# Patient Record
Sex: FEMALE | Race: WHITE | NOT HISPANIC OR LATINO | Employment: OTHER | URBAN - METROPOLITAN AREA
[De-identification: names, ages, dates, MRNs, and addresses within clinical notes are randomized per-mention and may not be internally consistent; named-entity substitution may affect disease eponyms.]

---

## 2017-09-20 ENCOUNTER — HOSPITAL ENCOUNTER (EMERGENCY)
Facility: HOSPITAL | Age: 62
Discharge: HOME | End: 2017-09-20
Attending: EMERGENCY MEDICINE
Payer: MEDICARE

## 2017-09-20 VITALS
RESPIRATION RATE: 17 BRPM | OXYGEN SATURATION: 97 % | WEIGHT: 176 LBS | BODY MASS INDEX: 32.39 KG/M2 | HEIGHT: 62 IN | HEART RATE: 51 BPM | DIASTOLIC BLOOD PRESSURE: 55 MMHG | TEMPERATURE: 98 F | SYSTOLIC BLOOD PRESSURE: 118 MMHG

## 2017-09-20 DIAGNOSIS — R07.9 CHEST PAIN, UNSPECIFIED TYPE: ICD-10-CM

## 2017-09-20 DIAGNOSIS — R53.1 WEAKNESS: Primary | ICD-10-CM

## 2017-09-20 LAB
ALBUMIN SERPL BCP-MCNC: 3.6 G/DL
ALP SERPL-CCNC: 62 U/L
ALT SERPL W/O P-5'-P-CCNC: 9 U/L
ANION GAP SERPL CALC-SCNC: 10 MMOL/L
AST SERPL-CCNC: 16 U/L
BASOPHILS # BLD AUTO: 0.04 K/UL
BASOPHILS NFR BLD: 0.4 %
BILIRUB SERPL-MCNC: 0.6 MG/DL
BNP SERPL-MCNC: 13 PG/ML
BUN SERPL-MCNC: 18 MG/DL
CALCIUM SERPL-MCNC: 9.8 MG/DL
CHLORIDE SERPL-SCNC: 103 MMOL/L
CHOLEST SERPL-MCNC: 239 MG/DL
CHOLEST/HDLC SERPL: 4.3 {RATIO}
CK SERPL-CCNC: 75 U/L
CO2 SERPL-SCNC: 26 MMOL/L
CREAT SERPL-MCNC: 1 MG/DL
DIFFERENTIAL METHOD: ABNORMAL
EOSINOPHIL # BLD AUTO: 0.6 K/UL
EOSINOPHIL NFR BLD: 5.6 %
ERYTHROCYTE [DISTWIDTH] IN BLOOD BY AUTOMATED COUNT: 13 %
EST. GFR  (AFRICAN AMERICAN): >60 ML/MIN/1.73 M^2
EST. GFR  (NON AFRICAN AMERICAN): >60 ML/MIN/1.73 M^2
GLUCOSE SERPL-MCNC: 88 MG/DL
HCT VFR BLD AUTO: 45.2 %
HDLC SERPL-MCNC: 56 MG/DL
HDLC SERPL: 23.4 %
HGB BLD-MCNC: 15.3 G/DL
INR PPP: 1
LDLC SERPL CALC-MCNC: 161.6 MG/DL
LYMPHOCYTES # BLD AUTO: 3.3 K/UL
LYMPHOCYTES NFR BLD: 30.3 %
MCH RBC QN AUTO: 31.1 PG
MCHC RBC AUTO-ENTMCNC: 33.8 G/DL
MCV RBC AUTO: 92 FL
MONOCYTES # BLD AUTO: 0.9 K/UL
MONOCYTES NFR BLD: 7.9 %
NEUTROPHILS # BLD AUTO: 6 K/UL
NEUTROPHILS NFR BLD: 55.6 %
NONHDLC SERPL-MCNC: 183 MG/DL
PLATELET # BLD AUTO: 177 K/UL
PMV BLD AUTO: 12.5 FL
POCT GLUCOSE: 77 MG/DL (ref 70–110)
POTASSIUM SERPL-SCNC: 3.6 MMOL/L
PROT SERPL-MCNC: 7.3 G/DL
PROTHROMBIN TIME: 10.6 SEC
RBC # BLD AUTO: 4.92 M/UL
SODIUM SERPL-SCNC: 139 MMOL/L
TRIGL SERPL-MCNC: 107 MG/DL
TROPONIN I SERPL DL<=0.01 NG/ML-MCNC: 0.01 NG/ML
TROPONIN I SERPL DL<=0.01 NG/ML-MCNC: <0.006 NG/ML
TSH SERPL DL<=0.005 MIU/L-ACNC: 1.39 UIU/ML
WBC # BLD AUTO: 10.79 K/UL

## 2017-09-20 PROCEDURE — 25500020 PHARM REV CODE 255

## 2017-09-20 PROCEDURE — 25000003 PHARM REV CODE 250: Performed by: EMERGENCY MEDICINE

## 2017-09-20 PROCEDURE — 85610 PROTHROMBIN TIME: CPT

## 2017-09-20 PROCEDURE — 80053 COMPREHEN METABOLIC PANEL: CPT

## 2017-09-20 PROCEDURE — 84443 ASSAY THYROID STIM HORMONE: CPT

## 2017-09-20 PROCEDURE — 93010 ELECTROCARDIOGRAM REPORT: CPT | Mod: ,,, | Performed by: INTERNAL MEDICINE

## 2017-09-20 PROCEDURE — 82962 GLUCOSE BLOOD TEST: CPT

## 2017-09-20 PROCEDURE — 93005 ELECTROCARDIOGRAM TRACING: CPT

## 2017-09-20 PROCEDURE — A9585 GADOBUTROL INJECTION: HCPCS

## 2017-09-20 PROCEDURE — G0425 INPT/ED TELECONSULT30: HCPCS | Mod: GT,,, | Performed by: PSYCHIATRY & NEUROLOGY

## 2017-09-20 PROCEDURE — 82550 ASSAY OF CK (CPK): CPT

## 2017-09-20 PROCEDURE — 85025 COMPLETE CBC W/AUTO DIFF WBC: CPT

## 2017-09-20 PROCEDURE — 99285 EMERGENCY DEPT VISIT HI MDM: CPT | Mod: 25

## 2017-09-20 PROCEDURE — 83880 ASSAY OF NATRIURETIC PEPTIDE: CPT

## 2017-09-20 PROCEDURE — 80061 LIPID PANEL: CPT

## 2017-09-20 PROCEDURE — 84484 ASSAY OF TROPONIN QUANT: CPT

## 2017-09-20 RX ORDER — GADOBUTROL 604.72 MG/ML
INJECTION INTRAVENOUS
Status: COMPLETED | OUTPATIENT
Start: 2017-09-20 | End: 2017-09-20

## 2017-09-20 RX ORDER — ASPIRIN 325 MG
325 TABLET ORAL
Status: COMPLETED | OUTPATIENT
Start: 2017-09-20 | End: 2017-09-20

## 2017-09-20 RX ADMIN — GADOBUTROL: 604.72 INJECTION INTRAVENOUS at 12:09

## 2017-09-20 RX ADMIN — ASPIRIN 325 MG ORAL TABLET 325 MG: 325 PILL ORAL at 02:09

## 2017-09-20 NOTE — ED PROVIDER NOTES
"Encounter Date: 9/20/2017       History     Chief Complaint   Patient presents with    Aphasia     aphasia and arm weakness. LKW unknown, pt does not remember     Patient is a 62-year-old female who says she awoke at 7 AM this morning with a tingling sensation described as "pins and needles" to her arms and to her face.  She also felt weak.  No syncope.  She denies headache or visual changes.  She also experienced chest discomfort with mild shortness of breath.  No nausea or vomiting.  Patient was able to drive herself to the hospital.      The history is provided by the patient.     Review of patient's allergies indicates:   Allergen Reactions    Pcn [penicillins] Other (See Comments)     Unable to obtain     No past medical history on file.  No past surgical history on file.  No family history on file.  Social History   Substance Use Topics    Smoking status: Former Smoker     Packs/day: 2.00     Years: 46.00    Smokeless tobacco: Not on file    Alcohol use Yes     Review of Systems   Constitutional: Negative for chills and fever.   Eyes: Negative for visual disturbance.   Respiratory: Positive for shortness of breath.    Cardiovascular: Positive for chest pain.   Gastrointestinal: Negative for nausea and vomiting.   Neurological: Positive for weakness and numbness. Negative for dizziness, syncope and headaches.   All other systems reviewed and are negative.      Physical Exam     Initial Vitals   BP Pulse Resp Temp SpO2   -- -- -- -- --      MAP       --         Physical Exam    Nursing note and vitals reviewed.  Constitutional: No distress.   HENT:   Head: Normocephalic and atraumatic.   Mouth/Throat: Oropharynx is clear and moist.   Eyes: EOM are normal. Pupils are equal, round, and reactive to light.   Neck: Normal range of motion. Neck supple.   Cardiovascular: Normal rate, regular rhythm and normal heart sounds.   Pulmonary/Chest: Breath sounds normal.   Abdominal: Soft. There is no tenderness. "   Musculoskeletal: Normal range of motion. She exhibits no edema.   Neurological: She is alert and oriented to person, place, and time.   Tremors noted to both upper extremities.   Skin: Skin is warm and dry.   Psychiatric: Her behavior is normal. Thought content normal.         ED Course   Procedures  Labs Reviewed   CBC W/ AUTO DIFFERENTIAL - Abnormal; Notable for the following:        Result Value    MCH 31.1 (*)     Eos # 0.6 (*)     All other components within normal limits   COMPREHENSIVE METABOLIC PANEL - Abnormal; Notable for the following:     ALT 9 (*)     All other components within normal limits   LIPID PANEL - Abnormal; Notable for the following:     Cholesterol 239 (*)     LDL Cholesterol 161.6 (*)     All other components within normal limits   PROTIME-INR   TSH   CK   TROPONIN I   CK   TROPONIN I   POCT GLUCOSE   POCT GLUCOSE     EKG Readings: (Independently Interpreted)   Rhythm: Normal Sinus Rhythm. Heart Rate: 64. ST Segments: Normal ST Segments. T Waves Flipped: II, AVF and V6.     Imaging Results          MRA Neck with contrast (Final result)  Result time 09/20/17 13:49:45    Final result by Kenneth Landin MD (09/20/17 13:49:45)                 Impression:         1.Periventricular white matter disease affecting more so the left cerebral hemisphere, appropriate for this patient's age.  2.  No evidence of acute infarction, intracranial mass or hemorrhage seen.  Mild inflammatory changes of the left side of the sphenoid sinus, a few ethmoid air cells and small mucus retention cyst in the right maxillary sinus.  3. Normal MRA of the intracranial circulation.  4.  Focal area of narrowing of the left carotid bulb, this appears to be 50%, correlation with evaluation with carotid Doppler recommended        Electronically signed by: KENNETH LANDIN MD  Date:     09/20/17  Time:    13:49              Narrative:    Brain MRI without and with contrast.    Comparison: None    Technique: Sagittal  and axial T1, axial T2, FLAIR, SWAN/FILT PHA, diffusion/ ADC,   images were obtained.  The patient received 10 cc of Gadavist IV contrast, post contrast images obtained.    Results: The brain is normally formed.  The ventricular system is normal in size.  There are few small areas of abnormal T2 and flair signal seen within the periventricular white matter of the bilateral cerebral hemispheres greater on the left consistent with small vessel chronic ischemic changes, normal for this patient's age.  There is no diffusion signal abnormality to suggest an acute infarction.  There is no evidence of remote sizable infarction.  The SWAN/FILT PHA images demonstrate no areas of abnormal signal to indicate remote hemorrhage.  There is no intra-axial mass or extra axial mass or fluid collection.  The sella, parasellar region, orbits,  and temporal bones demonstrate no significant abnormalities.  There is inflammatory changes of the left side of the sphenoid sinus, few right-sided ethmoid air cells and a small mucus retention cyst in the right maxillary sinus. The skull base flow voids are accounted for.   Following the administration of IV gadolinium contrast, no intra-axial or extra-axial areas of abnormal enhancement seen to suggest an inflammatory, infectious or tumor process.    MRA head.    3-D time-of-flight technique was utilized to obtain the MRA of the intracranial circulation.  MIP images were generated.    Results: The MRA flow map of the [place] vessels shows no significant abnormality on the MIP and source images.  There is no evidence of aneurysm, arteriovenous malformation, segmental flow decrease or major vascular occlusion.  The vertebro-basilar system appears normal.    Contrast of MRA neck    Results: MR contrast angiogram of the neck.  The patient received 10 cc of Gadavist contrast.    Normal appearance of the aortic arch. The bilateral common carotid arteries, and right internal carotid artery,  bilateral external carotid arteries appear normal without stenosis or irregularity.  Apparent focal stenosis greater than 50% of the left carotid bulb should be better evaluated with carotid Doppler.  The  bilateral vertebral arteries are patent.                             MRA Brain without contrast (Final result)  Result time 09/20/17 13:49:45    Final result by Kenneth Landin MD (09/20/17 13:49:45)                 Impression:         1.Periventricular white matter disease affecting more so the left cerebral hemisphere, appropriate for this patient's age.  2.  No evidence of acute infarction, intracranial mass or hemorrhage seen.  Mild inflammatory changes of the left side of the sphenoid sinus, a few ethmoid air cells and small mucus retention cyst in the right maxillary sinus.  3. Normal MRA of the intracranial circulation.  4.  Focal area of narrowing of the left carotid bulb, this appears to be 50%, correlation with evaluation with carotid Doppler recommended        Electronically signed by: KENNETH LANDIN MD  Date:     09/20/17  Time:    13:49              Narrative:    Brain MRI without and with contrast.    Comparison: None    Technique: Sagittal and axial T1, axial T2, FLAIR, SWAN/FILT PHA, diffusion/ ADC,   images were obtained.  The patient received 10 cc of Gadavist IV contrast, post contrast images obtained.    Results: The brain is normally formed.  The ventricular system is normal in size.  There are few small areas of abnormal T2 and flair signal seen within the periventricular white matter of the bilateral cerebral hemispheres greater on the left consistent with small vessel chronic ischemic changes, normal for this patient's age.  There is no diffusion signal abnormality to suggest an acute infarction.  There is no evidence of remote sizable infarction.  The SWAN/FILT PHA images demonstrate no areas of abnormal signal to indicate remote hemorrhage.  There is no intra-axial mass or extra  axial mass or fluid collection.  The sella, parasellar region, orbits,  and temporal bones demonstrate no significant abnormalities.  There is inflammatory changes of the left side of the sphenoid sinus, few right-sided ethmoid air cells and a small mucus retention cyst in the right maxillary sinus. The skull base flow voids are accounted for.   Following the administration of IV gadolinium contrast, no intra-axial or extra-axial areas of abnormal enhancement seen to suggest an inflammatory, infectious or tumor process.    MRA head.    3-D time-of-flight technique was utilized to obtain the MRA of the intracranial circulation.  MIP images were generated.    Results: The MRA flow map of the [place] of [place] vessels shows no significant abnormality on the MIP and source images.  There is no evidence of aneurysm, arteriovenous malformation, segmental flow decrease or major vascular occlusion.  The vertebro-basilar system appears normal.    Contrast of MRA neck    Results: MR contrast angiogram of the neck.  The patient received 10 cc of Gadavist contrast.    Normal appearance of the aortic arch. The bilateral common carotid arteries, and right internal carotid artery, bilateral external carotid arteries appear normal without stenosis or irregularity.  Apparent focal stenosis greater than 50% of the left carotid bulb should be better evaluated with carotid Doppler.  The  bilateral vertebral arteries are patent.                             MRI Brain W WO Contrast (Final result)  Result time 09/20/17 13:49:45    Final result by Hina Espitia MD (09/20/17 13:49:45)                 Impression:         1.Periventricular white matter disease affecting more so the left cerebral hemisphere, appropriate for this patient's age.  2.  No evidence of acute infarction, intracranial mass or hemorrhage seen.  Mild inflammatory changes of the left side of the sphenoid sinus, a few ethmoid air cells and small mucus retention cyst  in the right maxillary sinus.  3. Normal MRA of the intracranial circulation.  4.  Focal area of narrowing of the left carotid bulb, this appears to be 50%, correlation with evaluation with carotid Doppler recommended        Electronically signed by: KENNETH LANDIN MD  Date:     09/20/17  Time:    13:49              Narrative:    Brain MRI without and with contrast.    Comparison: None    Technique: Sagittal and axial T1, axial T2, FLAIR, SWAN/FILT PHA, diffusion/ ADC,   images were obtained.  The patient received 10 cc of Gadavist IV contrast, post contrast images obtained.    Results: The brain is normally formed.  The ventricular system is normal in size.  There are few small areas of abnormal T2 and flair signal seen within the periventricular white matter of the bilateral cerebral hemispheres greater on the left consistent with small vessel chronic ischemic changes, normal for this patient's age.  There is no diffusion signal abnormality to suggest an acute infarction.  There is no evidence of remote sizable infarction.  The SWAN/FILT PHA images demonstrate no areas of abnormal signal to indicate remote hemorrhage.  There is no intra-axial mass or extra axial mass or fluid collection.  The sella, parasellar region, orbits,  and temporal bones demonstrate no significant abnormalities.  There is inflammatory changes of the left side of the sphenoid sinus, few right-sided ethmoid air cells and a small mucus retention cyst in the right maxillary sinus. The skull base flow voids are accounted for.   Following the administration of IV gadolinium contrast, no intra-axial or extra-axial areas of abnormal enhancement seen to suggest an inflammatory, infectious or tumor process.    MRA head.    3-D time-of-flight technique was utilized to obtain the MRA of the intracranial circulation.  MIP images were generated.    Results: The MRA flow map of the [place] of [place] vessels shows no significant abnormality on the MIP  and source images.  There is no evidence of aneurysm, arteriovenous malformation, segmental flow decrease or major vascular occlusion.  The vertebro-basilar system appears normal.    Contrast of MRA neck    Results: MR contrast angiogram of the neck.  The patient received 10 cc of Gadavist contrast.    Normal appearance of the aortic arch. The bilateral common carotid arteries, and right internal carotid artery, bilateral external carotid arteries appear normal without stenosis or irregularity.  Apparent focal stenosis greater than 50% of the left carotid bulb should be better evaluated with carotid Doppler.  The  bilateral vertebral arteries are patent.                             X-Ray Chest AP Portable (Final result)  Result time 09/20/17 09:57:37    Final result by Beth Messina MD (09/20/17 09:57:37)                 Impression:     No evidence acute cardiac or pulmonary disease.      Electronically signed by: BETH MESSINA MD  Date:     09/20/17  Time:    09:57              Narrative:    Views: Portable    There is no pneumothorax, pleural effusion or focal consolidation.  The cardiac silhouette is within normal limits. The trachea is midline.  The osseous structures are unremarkable. There is atherosclerotic disease of aorta.                             CT Head Without Contrast (Final result)  Result time 09/20/17 09:32:03    Final result by Kenneth Landin MD (09/20/17 09:32:03)                 Impression:        No acute intracranial process seen.  If patient has symptoms of aphasia, though that MRI has greater sensitivity to detect acute infarction and could be done if this is clinically warranted.      Electronically signed by: KENNETH LANDIN MD  Date:     09/20/17  Time:    09:32              Narrative:    Head CT without contrast.    Comparison: none.    Technique: 5 mm axial images of the head were obtained.  No IV contrast was utilized.    Results: The brain is normally formed and  exhibits normal density throughout.  The ventricular system is within normal limits of size for age and shows no distortion by mass effect.  The brain parenchyma shows no evidence of mass, hemorrhage, or abnormal calcifications. No evidence of acute or remote infarction. No extra-axial masses, hemorrhage or abnormal fluid collections are identified.  The skull appears normal.  Small mucous retention cyst at the floor of the right maxillary sinus. The orbits demonstrate no abnormalities.  The mastoid air cells are well aerated.                                 Medical Decision Making:   ED Management:  62-year-old female who experienced vague symptoms of numbness earlier today and seemed to have difficulty speaking.  Symptoms resolved after arrival to the ED.  Patient underwent an unremarkable head CT.  MRI of the head and neck were also performed showing no signs of a stroke.  She also had chest pain today and underwent 2 serial troponins which are both negative.  Patient was seen by the [facility] family practice resident, who she will follow up with tomorrow.  She will return here for any further concerns developing prior to follow-up.                   ED Course      Clinical Impression:   The primary encounter diagnosis was Expressive aphasia. Diagnoses of Weakness and Chest pain, unspecified type were also pertinent to this visit.                           Jaswant Humphrey MD  09/20/17 [id number]       Jaswant Humphrey MD  09/23/17 [id number]

## 2017-09-20 NOTE — CONSULTS
[facility]  Tele-Consultation Note    Consultation started: 9/20/2017 at 9:45 AM   Consulting Provider: Spoke Physician:: Dr. Jaswant Humphrey  The patient location is [facility]  Spoke hospital nurse at bedside with patient assisting consultant.     Subjective:   Subjective   History of Present Illness:  Elissa Raymond is a 62 y.o. female who presents with acute onset inability to speak, tingling bilateral upper extremities and whole face. EMS reported that she was weak in the right UE but she denies being weak.  By the time I started my consultation patient was remarkably improved.    Wake up Stroke?: no  Last known normal: Last known well (date and time):: unknown  Recent bleeding noted: no  Does the patient take any Blood Thinners? no           H&P was obtained from patient.   Past Medical History: hypertension and hyperlipidemia    Past Surgical History: no surgeries within the last 3 months    Family History: hypertension    Social History: no smoking, no drinking, no drugs    Medications: No current facility-administered medications for this encounter.     Current Outpatient Prescriptions:     ciclopirox (PENLAC) 8 % Soln, Apply topically once daily. Apply topically to affected nails once daily.  Remove once weekly.  Repeat.  Follow package insert., Disp: 6.6 mL, Rfl: 12    urea (CARMOL) 40 % Crea, Apply topically 2 (two) times daily., Disp: 199 each, Rfl: 10    Allergies: No known drug allergies    Review Of Systems:     Review of Systems   Constitutional: Negative for chills and fever.   HENT: Negative for facial swelling and trouble swallowing.    Eyes: Negative for photophobia, discharge and visual disturbance.   Respiratory: Positive for chest tightness. Negative for wheezing and stridor.    Cardiovascular: Negative for chest pain, palpitations and leg swelling.   Gastrointestinal: Negative for abdominal pain, diarrhea and  vomiting.   Endocrine: Negative for cold intolerance and polyphagia.   Genitourinary: Negative for hematuria.   Musculoskeletal: Negative for gait problem, neck pain and neck stiffness.   Allergic/Immunologic: Negative for immunocompromised state.   Neurological: Positive for tremors and numbness. Negative for dizziness, seizures, facial asymmetry, weakness and headaches.   Hematological: Does not bruise/bleed easily.   Psychiatric/Behavioral: Negative for agitation, behavioral problems and confusion.       Objective:     Vitals: There were no vitals filed for this visit. BP: 140/70, Respiratory Rate: 17 and Heart Rate: 66    Objective   Physical Exam:  Physical Exam   Constitutional: She is oriented to person, place, and time. She appears well-developed and well-nourished.   HENT:   Head: Normocephalic and atraumatic.   Eyes: EOM are normal. Pupils are equal, round, and reactive to light.   Neck: Normal range of motion. Neck supple.   Cardiovascular: Normal rate.    Pulmonary/Chest: Effort normal and breath sounds normal.   Abdominal: Soft. She exhibits no mass.   Genitourinary:   Genitourinary Comments: No performed   Neurological: She is alert and oriented to person, place, and time. She displays normal reflexes. No cranial nerve deficit. She exhibits normal muscle tone. Coordination normal.   Skin: Skin is warm. No rash noted.   Psychiatric: She has a normal mood and affect. Her behavior is normal.          Imaging Notes: No hemmorhage. No mass effect. No early infarct signs. Impression performed at: 9:28 am    NIH Stroke Scale:  Interval: baseline (upon arrival/admit)  Level of Consciousness: 0 - alert  LOC Questions: 0 - answers both correctly  LOC Commands: 0 - performs both correctly  Best Gaze: 0 - normal  Visual: 0 - no visual loss  Facial Palsy: 0 - normal  Motor Left Arm: 0 - no drift  Motor Right Arm: 0 - no drift  Motor Left Le - no drift  Motor Right Le - no drift  Limb Ataxia: 0 -  absent  Sensory: 0 - normal  Best Language: 0 - no aphasia  Dysarthria: 0 - normal articulation  Extinction and Inattention: 0 - no neglect  NIH Stroke Scale Total: 0  ABCD2 Scale for TIA:   Age > or = 60: 1 - yes  B/P or = 140/9 at Initial Evaluation: 0 - no  Clinical Features of TIA: 0 - other symptoms  Duration of Symptoms: 1 - 10-59 minutes  Diabetes Mellitus in History: 0 - no  ABCD2 Scale Total: 2        Assessment - Diagnosis - Goals:   61 y/o with acute onset inability to speak, bilateral upper extremities and face tingling. NIHSS 0.  Although can not ruled out a subcortical  Infarct, her syndrome doesn't fit anatomically.    Plan   MRI/MRA brain. Further stroke work up if MRI + for stroke.  Diagnosis/Impression: Transient neurological dysfunction of unclear etiology. Can not ruled out subcortical infarct but her syndrome does not fit anatomically.    Alteplase Recommendation: Altaplase not recommended due to Outside of treament window    Recommendation: NPO until after pass dysphagia screen. Diagnostic studies: MRA head to assess vasculature, MRA neck/arch to assess vasculature, MRI head without contrast to assess brain parenchyma  Consults: Other: neurology    Disposition Recommendation:  do not transfer       Possible Interventional Revascularization Candidate? No; No significant neurological deficit    Recommended the emergency room physician to have a brief discussion with the patient and/or family if available regarding the risks and benefits of treatment, and to briefly document the occurrence of that discussion in his clinical encounter note.     The attending portion of this evaluation, treatment, and documentation was performed per Michael Pickett via audiovisual.      Billing code:  (non-intervention mild to moderate stroke, TIA, some mimics)    · This patient has a critical neurological condition/illness, with some potential for high morbidity and mortality.  · There is a moderate  probability for acute neurological change leading to clinical and possibly life-threatening deterioration requiring highest level of physician preparedness for urgent intervention.  · Care was coordinated with other physicians involved in the patient's care.  · Radiologic studies and laboratory data were reviewed and interpreted, and plan of care was re-assessed based on the results.  · Diagnosis, treatment options and prognosis may have been discussed with the patient and/or family members or caregiver.        Consultation ended: 9/20/2017 at 9:43 am

## 2017-11-02 ENCOUNTER — OFFICE VISIT (OUTPATIENT)
Dept: FAMILY MEDICINE | Facility: CLINIC | Age: 62
End: 2017-11-02
Attending: FAMILY MEDICINE
Payer: MEDICARE

## 2017-11-02 VITALS
HEART RATE: 69 BPM | SYSTOLIC BLOOD PRESSURE: 120 MMHG | WEIGHT: 135.56 LBS | BODY MASS INDEX: 24.95 KG/M2 | DIASTOLIC BLOOD PRESSURE: 80 MMHG | TEMPERATURE: 98 F | OXYGEN SATURATION: 96 % | HEIGHT: 62 IN

## 2017-11-02 DIAGNOSIS — J20.9 ACUTE BRONCHITIS, UNSPECIFIED ORGANISM: Primary | ICD-10-CM

## 2017-11-02 DIAGNOSIS — R05.9 COUGH: ICD-10-CM

## 2017-11-02 DIAGNOSIS — Z72.0 TOBACCO USE: ICD-10-CM

## 2017-11-02 DIAGNOSIS — M25.50 ARTHRALGIA, UNSPECIFIED JOINT: ICD-10-CM

## 2017-11-02 PROCEDURE — 99204 OFFICE O/P NEW MOD 45 MIN: CPT | Mod: S$PBB,,, | Performed by: FAMILY MEDICINE

## 2017-11-02 PROCEDURE — 99213 OFFICE O/P EST LOW 20 MIN: CPT | Mod: PBBFAC,PO | Performed by: FAMILY MEDICINE

## 2017-11-02 PROCEDURE — 99999 PR PBB SHADOW E&M-EST. PATIENT-LVL III: CPT | Mod: PBBFAC,,, | Performed by: FAMILY MEDICINE

## 2017-11-02 RX ORDER — DOXYCYCLINE 100 MG/1
100 CAPSULE ORAL 2 TIMES DAILY
Qty: 20 CAPSULE | Refills: 0 | Status: SHIPPED | OUTPATIENT
Start: 2017-11-02 | End: 2018-05-16

## 2017-11-02 RX ORDER — IBUPROFEN 800 MG/1
800 TABLET ORAL 3 TIMES DAILY PRN
Qty: 30 TABLET | Refills: 0 | Status: SHIPPED | OUTPATIENT
Start: 2017-11-02 | End: 2018-05-16

## 2017-11-02 RX ORDER — TRAZODONE HYDROCHLORIDE 100 MG/1
100 TABLET ORAL NIGHTLY
COMMUNITY
Start: 2017-10-23 | End: 2018-06-06

## 2017-11-02 RX ORDER — PROMETHAZINE HYDROCHLORIDE AND DEXTROMETHORPHAN HYDROBROMIDE 6.25; 15 MG/5ML; MG/5ML
5 SYRUP ORAL 2 TIMES DAILY PRN
Qty: 150 ML | Refills: 0 | Status: SHIPPED | OUTPATIENT
Start: 2017-11-02 | End: 2017-11-12

## 2017-11-02 RX ORDER — LISINOPRIL AND HYDROCHLOROTHIAZIDE 12.5; 2 MG/1; MG/1
1 TABLET ORAL DAILY
COMMUNITY
Start: 2017-09-27 | End: 2018-06-06

## 2017-11-02 NOTE — PROGRESS NOTES
Subjective:       Patient ID: Elissa Raymond is a 62 y.o. female.    Chief Complaint: Sinusitis; Cough; and Headache    62 YR OLD PLEASANT WHITE FEMALE WITH SMOKING USE, POLYARTHRITIS, HTN, DEPRESSION, PRESENTS TODAY AS NEW PATIENT AND EVALUATION OF COUGH/SOB AND ARTHRALGIA. ONSET 2 MONTHS AGO AND GRADUALLY WORSENING. SHE HAD TIA 2 MONTHS AGO IN [place] AND SHE IS NOT NORMAL SINCE THEN. NO FEVER OR CHILLS. SHE HAD NO WORK UP SINCE HOSPITAL DISCHARGE OTHER THAN CARDIOVASCULAR AND STROKE W/U. SHE DENIES ANY SICK CONTACTS/RECENT TRAVEL. DETAILS AS FOLLOWS -      HISTORY AS BELOW - REVIEWED       Cough   This is a chronic problem. The current episode started more than 1 month ago. The problem has been unchanged. The problem occurs constantly. The cough is non-productive. Associated symptoms include myalgias and shortness of breath. Pertinent negatives include no chest pain, ear pain, headaches, rash, rhinorrhea, sore throat, weight loss or wheezing. Nothing aggravates the symptoms. Risk factors for lung disease include smoking/tobacco exposure. She has tried OTC cough suppressant for the symptoms. The treatment provided no relief. Her past medical history is significant for bronchitis, environmental allergies and pneumonia. There is no history of asthma.   Shortness of Breath   This is a recurrent problem. The current episode started 1 to 4 weeks ago. The problem occurs intermittently. The problem has been unchanged. Pertinent negatives include no chest pain, coryza, ear pain, headaches, leg swelling, orthopnea, rash, rhinorrhea, sore throat, swollen glands or wheezing. Nothing aggravates the symptoms. She has tried OTC cough suppressants for the symptoms. The treatment provided no relief. Her past medical history is significant for pneumonia. There is no history of allergies, asthma, CAD, chronic lung disease or DVT.   Arthritis   Presents for initial visit. The disease course has been worsening. She complains of  pain. Affected locations include the right knee, left knee, right shoulder, left shoulder, right wrist, left elbow and left hip. Her pain is at a severity of 5/10. Associated symptoms include fatigue and pain at night. Pertinent negatives include no dysuria, rash, Raynaud's syndrome or weight loss. Her past medical history is significant for osteoarthritis. Past treatments include acetaminophen. The treatment provided no relief. Factors aggravating her arthritis include activity, lifting and exposure to cold air. Compliance with prior treatments has been good.     Review of Systems   Constitutional: Positive for fatigue. Negative for activity change, diaphoresis, unexpected weight change and weight loss.   HENT: Negative.  Negative for congestion, ear discharge, ear pain, hearing loss, rhinorrhea, sore throat and voice change.    Eyes: Negative.  Negative for pain, discharge and visual disturbance.   Respiratory: Positive for cough and shortness of breath. Negative for chest tightness and wheezing.    Cardiovascular: Negative.  Negative for chest pain, orthopnea and leg swelling.   Gastrointestinal: Negative.  Negative for abdominal distention, anal bleeding, constipation and nausea.   Endocrine: Negative.  Negative for cold intolerance, polydipsia and polyuria.   Genitourinary: Negative.  Negative for decreased urine volume, difficulty urinating, dysuria, frequency, menstrual problem and vaginal pain.   Musculoskeletal: Positive for arthralgias, arthritis, back pain and myalgias. Negative for gait problem.   Skin: Negative.  Negative for color change, pallor, rash and wound.   Allergic/Immunologic: Positive for environmental allergies. Negative for immunocompromised state.   Neurological: Negative.  Negative for dizziness, tremors, seizures, speech difficulty and headaches.   Hematological: Negative.  Negative for adenopathy. Does not bruise/bleed easily.   Psychiatric/Behavioral: Negative.  Negative for agitation,  confusion, decreased concentration, hallucinations, self-injury and suicidal ideas. The patient is not nervous/anxious.        PMH/PSH/FH/SH/MED/ALLERGY reviewed    Objective:       Vitals:    11/02/17 1733   BP: 120/80   Pulse: 69   Temp: 97.7 °F (36.5 °C)       Physical Exam   Constitutional: She is oriented to person, place, and time. She appears well-developed and well-nourished. No distress.   HENT:   Head: Normocephalic and atraumatic.   Right Ear: External ear normal.   Left Ear: External ear normal.   Nose: Nose normal.   Mouth/Throat: Oropharynx is clear and moist. No oropharyngeal exudate.   Eyes: Conjunctivae and EOM are normal. Pupils are equal, round, and reactive to light. Right eye exhibits no discharge. Left eye exhibits no discharge. No scleral icterus.   Neck: Normal range of motion. Neck supple. No JVD present. No tracheal deviation present. No thyromegaly present.   Cardiovascular: Normal rate, regular rhythm, normal heart sounds and intact distal pulses.  Exam reveals no gallop and no friction rub.    No murmur heard.  Pulmonary/Chest: Effort normal. No stridor. She has wheezes. She has rales. She exhibits no tenderness.   B/L RALES AND WHEEZING   Abdominal: Soft. Bowel sounds are normal. She exhibits no distension and no mass. There is no tenderness. There is no rebound and no guarding. No hernia.   Musculoskeletal: Normal range of motion. She exhibits no edema or tenderness.   Lymphadenopathy:     She has no cervical adenopathy.   Neurological: She is alert and oriented to person, place, and time. She has normal reflexes. She displays normal reflexes. No cranial nerve deficit. She exhibits normal muscle tone. Coordination normal.   Skin: Skin is warm and dry. No rash noted. She is not diaphoretic. No erythema. No pallor.   Psychiatric: She has a normal mood and affect. Her behavior is normal. Judgment and thought content normal.       Assessment:       1. Acute bronchitis, unspecified organism     2. Cough    3. Arthralgia, unspecified joint    4. Tobacco use        Plan:       Elissa was seen today for sinusitis, cough and headache.    Diagnoses and all orders for this visit:    Acute bronchitis, unspecified organism  -     doxycycline (MONODOX) 100 MG capsule; Take 1 capsule (100 mg total) by mouth 2 (two) times daily.  -     promethazine-dextromethorphan (PROMETHAZINE-DM) 6.25-15 mg/5 mL Syrp; Take 5 mLs by mouth 2 (two) times daily as needed.    Cough  -     doxycycline (MONODOX) 100 MG capsule; Take 1 capsule (100 mg total) by mouth 2 (two) times daily.  -     promethazine-dextromethorphan (PROMETHAZINE-DM) 6.25-15 mg/5 mL Syrp; Take 5 mLs by mouth 2 (two) times daily as needed.    Arthralgia, unspecified joint  -     ibuprofen (ADVIL,MOTRIN) 800 MG tablet; Take 1 tablet (800 mg total) by mouth 3 (three) times daily as needed for Pain.    Tobacco use      ACUTE BRONCHITIS/COUGH  -ADVISED TO QUIT SMOKING  -DOXYCYCLINE X 10 DAYS  -COUGH SYRUP NIGHTLY    ARTHRALGIA  -IBUPROFEN PRN SPARINGLY W/FOOD    TOBACCO USE  -ADVISED TO QUIT - ONE MIN COUNSELING DONE    Spent adequate time in obtaining history and explaining differentials    40 minutes spent during this visit of which greater than 50% devoted to face-face counseling and coordination of care regarding diagnosis and management plan    Return if symptoms worsen or fail to improve.

## 2018-05-13 ENCOUNTER — HOSPITAL ENCOUNTER (EMERGENCY)
Facility: HOSPITAL | Age: 63
Discharge: HOME | End: 2018-05-13
Attending: EMERGENCY MEDICINE
Payer: MEDICARE

## 2018-05-13 VITALS
TEMPERATURE: 98 F | OXYGEN SATURATION: 98 % | HEIGHT: 62 IN | RESPIRATION RATE: 16 BRPM | DIASTOLIC BLOOD PRESSURE: 74 MMHG | SYSTOLIC BLOOD PRESSURE: 175 MMHG | WEIGHT: 135 LBS | BODY MASS INDEX: 24.84 KG/M2 | HEART RATE: 69 BPM

## 2018-05-13 DIAGNOSIS — R52 PAIN: ICD-10-CM

## 2018-05-13 DIAGNOSIS — R07.81 RIB PAIN: ICD-10-CM

## 2018-05-13 DIAGNOSIS — S20.212A RIB CONTUSION, LEFT, INITIAL ENCOUNTER: Primary | ICD-10-CM

## 2018-05-13 DIAGNOSIS — R06.02 SHORTNESS OF BREATH: ICD-10-CM

## 2018-05-13 PROCEDURE — 93010 ELECTROCARDIOGRAM REPORT: CPT | Mod: ,,, | Performed by: INTERNAL MEDICINE

## 2018-05-13 PROCEDURE — 94799 UNLISTED PULMONARY SVC/PX: CPT

## 2018-05-13 PROCEDURE — 96372 THER/PROPH/DIAG INJ SC/IM: CPT

## 2018-05-13 PROCEDURE — 93005 ELECTROCARDIOGRAM TRACING: CPT

## 2018-05-13 PROCEDURE — 63600175 PHARM REV CODE 636 W HCPCS: Performed by: NURSE PRACTITIONER

## 2018-05-13 PROCEDURE — 99284 EMERGENCY DEPT VISIT MOD MDM: CPT | Mod: 25

## 2018-05-13 RX ORDER — KETOROLAC TROMETHAMINE 30 MG/ML
30 INJECTION, SOLUTION INTRAMUSCULAR; INTRAVENOUS
Status: COMPLETED | OUTPATIENT
Start: 2018-05-13 | End: 2018-05-13

## 2018-05-13 RX ORDER — METHOCARBAMOL 500 MG/1
1000 TABLET, FILM COATED ORAL 3 TIMES DAILY
Qty: 30 TABLET | Refills: 0 | Status: SHIPPED | OUTPATIENT
Start: 2018-05-13 | End: 2018-05-18

## 2018-05-13 RX ORDER — NAPROXEN 500 MG/1
500 TABLET ORAL 2 TIMES DAILY WITH MEALS
Qty: 10 TABLET | Refills: 0 | Status: SHIPPED | OUTPATIENT
Start: 2018-05-13 | End: 2018-06-06

## 2018-05-13 RX ADMIN — KETOROLAC TROMETHAMINE 30 MG: 30 INJECTION, SOLUTION INTRAMUSCULAR at 06:05
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
## 2018-05-14 NOTE — DISCHARGE INSTRUCTIONS
Please take prescribed medication as labeled and as needed for pain. Please do not drive or operate machinery while taking Robaxin. Use incentive spirometer. F/u with your PCP within 2-3 days and return to ED if symptoms worsen or change, such as any increased shortness of breath.

## 2018-05-16 ENCOUNTER — HOSPITAL ENCOUNTER (EMERGENCY)
Facility: HOSPITAL | Age: 63
Discharge: HOME | End: 2018-05-16
Attending: EMERGENCY MEDICINE | Admitting: EMERGENCY MEDICINE
Payer: MEDICARE

## 2018-05-16 VITALS
WEIGHT: 135 LBS | OXYGEN SATURATION: 97 % | RESPIRATION RATE: 16 BRPM | BODY MASS INDEX: 24.84 KG/M2 | HEIGHT: 62 IN | TEMPERATURE: 98 F | SYSTOLIC BLOOD PRESSURE: 127 MMHG | DIASTOLIC BLOOD PRESSURE: 42 MMHG | HEART RATE: 52 BPM

## 2018-05-16 DIAGNOSIS — R07.9 CHEST PAIN: ICD-10-CM

## 2018-05-16 DIAGNOSIS — R07.81 PLEURITIC CHEST PAIN: ICD-10-CM

## 2018-05-16 DIAGNOSIS — S22.32XK CLOSED FRACTURE OF ONE RIB OF LEFT SIDE WITH NONUNION, SUBSEQUENT ENCOUNTER: Primary | ICD-10-CM

## 2018-05-16 DIAGNOSIS — F17.200 SMOKER: ICD-10-CM

## 2018-05-16 LAB
ALBUMIN SERPL BCP-MCNC: 3.7 G/DL
ALP SERPL-CCNC: 64 U/L
ALT SERPL W/O P-5'-P-CCNC: 9 U/L
ANION GAP SERPL CALC-SCNC: 8 MMOL/L
AST SERPL-CCNC: 17 U/L
BASOPHILS # BLD AUTO: 0.04 K/UL
BASOPHILS NFR BLD: 0.4 %
BILIRUB SERPL-MCNC: 0.6 MG/DL
BUN SERPL-MCNC: 17 MG/DL
CALCIUM SERPL-MCNC: 9.3 MG/DL
CHLORIDE SERPL-SCNC: 108 MMOL/L
CO2 SERPL-SCNC: 22 MMOL/L
CREAT SERPL-MCNC: 0.9 MG/DL
DIFFERENTIAL METHOD: ABNORMAL
EOSINOPHIL # BLD AUTO: 0.7 K/UL
EOSINOPHIL NFR BLD: 6.3 %
ERYTHROCYTE [DISTWIDTH] IN BLOOD BY AUTOMATED COUNT: 13.4 %
EST. GFR  (AFRICAN AMERICAN): >60 ML/MIN/1.73 M^2
EST. GFR  (NON AFRICAN AMERICAN): >60 ML/MIN/1.73 M^2
GLUCOSE SERPL-MCNC: 74 MG/DL
HCT VFR BLD AUTO: 43.5 %
HGB BLD-MCNC: 13.7 G/DL
LYMPHOCYTES # BLD AUTO: 2.2 K/UL
LYMPHOCYTES NFR BLD: 20.8 %
MCH RBC QN AUTO: 29.3 PG
MCHC RBC AUTO-ENTMCNC: 31.5 G/DL
MCV RBC AUTO: 93 FL
MONOCYTES # BLD AUTO: 0.9 K/UL
MONOCYTES NFR BLD: 8.3 %
NEUTROPHILS # BLD AUTO: 6.7 K/UL
NEUTROPHILS NFR BLD: 64 %
PLATELET # BLD AUTO: 154 K/UL
PMV BLD AUTO: 12.2 FL
POTASSIUM SERPL-SCNC: 4.2 MMOL/L
PROT SERPL-MCNC: 6.7 G/DL
RBC # BLD AUTO: 4.68 M/UL
SODIUM SERPL-SCNC: 138 MMOL/L
TROPONIN I SERPL DL<=0.01 NG/ML-MCNC: 0.01 NG/ML
WBC # BLD AUTO: 10.49 K/UL

## 2018-05-16 PROCEDURE — 25000242 PHARM REV CODE 250 ALT 637 W/ HCPCS: Performed by: EMERGENCY MEDICINE

## 2018-05-16 PROCEDURE — 93005 ELECTROCARDIOGRAM TRACING: CPT

## 2018-05-16 PROCEDURE — 99284 EMERGENCY DEPT VISIT MOD MDM: CPT | Mod: 25

## 2018-05-16 PROCEDURE — 94640 AIRWAY INHALATION TREATMENT: CPT

## 2018-05-16 PROCEDURE — 25000003 PHARM REV CODE 250: Performed by: EMERGENCY MEDICINE

## 2018-05-16 PROCEDURE — 84484 ASSAY OF TROPONIN QUANT: CPT

## 2018-05-16 PROCEDURE — 85025 COMPLETE CBC W/AUTO DIFF WBC: CPT

## 2018-05-16 PROCEDURE — 63600175 PHARM REV CODE 636 W HCPCS: Performed by: EMERGENCY MEDICINE

## 2018-05-16 PROCEDURE — 80053 COMPREHEN METABOLIC PANEL: CPT

## 2018-05-16 RX ORDER — MORPHINE SULFATE 4 MG/ML
4 INJECTION, SOLUTION INTRAMUSCULAR; INTRAVENOUS
Status: COMPLETED | OUTPATIENT
Start: 2018-05-16 | End: 2018-05-16

## 2018-05-16 RX ORDER — ONDANSETRON 4 MG/1
4 TABLET, ORALLY DISINTEGRATING ORAL
Status: COMPLETED | OUTPATIENT
Start: 2018-05-16 | End: 2018-05-16

## 2018-05-16 RX ORDER — ALBUTEROL SULFATE 0.83 MG/ML
2.5 SOLUTION RESPIRATORY (INHALATION)
Status: COMPLETED | OUTPATIENT
Start: 2018-05-16 | End: 2018-05-16

## 2018-05-16 RX ORDER — ALBUTEROL SULFATE 90 UG/1
1-2 AEROSOL, METERED RESPIRATORY (INHALATION) EVERY 6 HOURS PRN
Qty: 1 INHALER | Refills: 0 | Status: SHIPPED | OUTPATIENT
Start: 2018-05-16 | End: 2018-05-16

## 2018-05-16 RX ORDER — KETOROLAC TROMETHAMINE 30 MG/ML
30 INJECTION, SOLUTION INTRAMUSCULAR; INTRAVENOUS
Status: COMPLETED | OUTPATIENT
Start: 2018-05-16 | End: 2018-05-16

## 2018-05-16 RX ORDER — HYDROCODONE BITARTRATE AND ACETAMINOPHEN 7.5; 325 MG/1; MG/1
1 TABLET ORAL EVERY 6 HOURS PRN
Qty: 12 TABLET | Refills: 0 | Status: SHIPPED | OUTPATIENT
Start: 2018-05-16 | End: 2018-06-06

## 2018-05-16 RX ORDER — ALBUTEROL SULFATE 90 UG/1
1-2 AEROSOL, METERED RESPIRATORY (INHALATION) EVERY 6 HOURS PRN
Qty: 1 INHALER | Refills: 0 | Status: SHIPPED | OUTPATIENT
Start: 2018-05-16 | End: 2018-06-06

## 2018-05-16 RX ADMIN — ONDANSETRON 4 MG: 4 TABLET, ORALLY DISINTEGRATING ORAL at 08:05

## 2018-05-16 RX ADMIN — MORPHINE SULFATE 4 MG: 4 INJECTION INTRAVENOUS at 08:05

## 2018-05-16 RX ADMIN — KETOROLAC TROMETHAMINE 30 MG: 30 INJECTION, SOLUTION INTRAMUSCULAR at 08:05

## 2018-05-16 RX ADMIN — ALBUTEROL SULFATE 2.5 MG: 2.5 SOLUTION RESPIRATORY (INHALATION) at 11:05
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
## 2018-05-16 NOTE — ED NOTES
APPEARANCE: Alert, oriented and in no acute distress.  CARDIAC: Normal rate and rhythm, no murmur heard.   PERIPHERAL VASCULAR: peripheral pulses present. Normal cap refill. No edema. Warm to touch.    RESPIRATORY:Normal rate and effort, breath sounds clear bilaterally throughout chest. Respirations are equal and unlabored no obvious signs of distress.  GASTRO: soft, bowel sounds normal, no tenderness, no abdominal distention.  MUSC: Full ROM. Left sided chest wall pain that starts proximal to spine and radiates around to under left breast. No bruising, swelling or obvious deformity noted.   SKIN: Skin is warm and dry, normal skin turgor, mucous membranes moist.  NEURO: 5/5 strength major flexors/extensors bilaterally. Sensory intact to light touch bilaterally. Sterling coma scale: eyes open spontaneously-4, oriented & converses-5, obeys commands-6. No neurological abnormalities.   MENTAL STATUS: awake, alert and aware of environment.  EYE: PERRL, both eyes: pupils brisk and reactive to light. Normal size.  ENT: EARS: no obvious drainage. NOSE: no active bleeding.

## 2018-05-16 NOTE — ED TRIAGE NOTES
Presents with left sided chest wall pain after fall on Saturday, was seen here Sunday, negative x-rays. States she was rolling over in bed last night and felt a pop and pain increased drastically.

## 2018-05-20 ENCOUNTER — HOSPITAL ENCOUNTER (EMERGENCY)
Facility: HOSPITAL | Age: 63
Discharge: HOME | End: 2018-05-20
Attending: EMERGENCY MEDICINE
Payer: MEDICARE

## 2018-05-20 VITALS
DIASTOLIC BLOOD PRESSURE: 63 MMHG | SYSTOLIC BLOOD PRESSURE: 111 MMHG | BODY MASS INDEX: 24.84 KG/M2 | WEIGHT: 135 LBS | HEIGHT: 62 IN | TEMPERATURE: 98 F | OXYGEN SATURATION: 97 % | RESPIRATION RATE: 18 BRPM | HEART RATE: 73 BPM

## 2018-05-20 DIAGNOSIS — S22.39XA RIB FRACTURE: ICD-10-CM

## 2018-05-20 PROCEDURE — 99283 EMERGENCY DEPT VISIT LOW MDM: CPT | Mod: 25

## 2018-05-20 PROCEDURE — 25000003 PHARM REV CODE 250: Performed by: EMERGENCY MEDICINE

## 2018-05-20 PROCEDURE — 63600175 PHARM REV CODE 636 W HCPCS: Performed by: EMERGENCY MEDICINE

## 2018-05-20 PROCEDURE — 96372 THER/PROPH/DIAG INJ SC/IM: CPT

## 2018-05-20 RX ORDER — OXYCODONE AND ACETAMINOPHEN 7.5; 325 MG/1; MG/1
1 TABLET ORAL EVERY 4 HOURS PRN
Qty: 30 TABLET | Refills: 0 | Status: SHIPPED | OUTPATIENT
Start: 2018-05-20 | End: 2018-06-06

## 2018-05-20 RX ORDER — ONDANSETRON 4 MG/1
4 TABLET, ORALLY DISINTEGRATING ORAL EVERY 6 HOURS PRN
Qty: 20 TABLET | Refills: 0 | Status: SHIPPED | OUTPATIENT
Start: 2018-05-20 | End: 2018-06-06

## 2018-05-20 RX ORDER — ONDANSETRON 4 MG/1
4 TABLET, ORALLY DISINTEGRATING ORAL
Status: COMPLETED | OUTPATIENT
Start: 2018-05-20 | End: 2018-05-20

## 2018-05-20 RX ORDER — HYDROMORPHONE HYDROCHLORIDE 1 MG/ML
1 INJECTION, SOLUTION INTRAMUSCULAR; INTRAVENOUS; SUBCUTANEOUS
Status: COMPLETED | OUTPATIENT
Start: 2018-05-20 | End: 2018-05-20

## 2018-05-20 RX ADMIN — ONDANSETRON 4 MG: 4 TABLET, ORALLY DISINTEGRATING ORAL at 04:05

## 2018-05-20 RX ADMIN — HYDROMORPHONE HYDROCHLORIDE 1 MG: 1 INJECTION, SOLUTION INTRAMUSCULAR; INTRAVENOUS; SUBCUTANEOUS at 04:05
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
## 2018-05-20 NOTE — ED TRIAGE NOTES
Pt states she was recently diagnosed with a left rib fracture, pt states she was given NORCO for pain 4 days ago, pt complaining of vomiting and severe pain.

## 2018-06-06 ENCOUNTER — HOSPITAL ENCOUNTER (OUTPATIENT)
Dept: RADIOLOGY | Facility: HOSPITAL | Age: 63
Discharge: HOME | End: 2018-06-06
Attending: FAMILY MEDICINE
Payer: MEDICARE

## 2018-06-06 ENCOUNTER — OFFICE VISIT (OUTPATIENT)
Dept: FAMILY MEDICINE | Facility: CLINIC | Age: 63
End: 2018-06-06
Payer: MEDICARE

## 2018-06-06 VITALS
TEMPERATURE: 98 F | OXYGEN SATURATION: 97 % | HEIGHT: 62 IN | HEART RATE: 65 BPM | DIASTOLIC BLOOD PRESSURE: 60 MMHG | WEIGHT: 137.38 LBS | SYSTOLIC BLOOD PRESSURE: 135 MMHG | BODY MASS INDEX: 25.28 KG/M2

## 2018-06-06 DIAGNOSIS — S22.32XG CLOSED FRACTURE OF ONE RIB OF LEFT SIDE WITH DELAYED HEALING, SUBSEQUENT ENCOUNTER: ICD-10-CM

## 2018-06-06 DIAGNOSIS — Z12.11 COLON CANCER SCREENING: ICD-10-CM

## 2018-06-06 DIAGNOSIS — Z01.419 WELL WOMAN EXAM: Primary | ICD-10-CM

## 2018-06-06 DIAGNOSIS — H91.90 HEARING LOSS, UNSPECIFIED HEARING LOSS TYPE, UNSPECIFIED LATERALITY: ICD-10-CM

## 2018-06-06 DIAGNOSIS — Z86.73 HISTORY OF CVA (CEREBROVASCULAR ACCIDENT): ICD-10-CM

## 2018-06-06 DIAGNOSIS — H35.30 MACULAR DEGENERATION OF BOTH EYES, UNSPECIFIED TYPE: ICD-10-CM

## 2018-06-06 DIAGNOSIS — Z12.39 SCREENING FOR MALIGNANT NEOPLASM OF BREAST: ICD-10-CM

## 2018-06-06 DIAGNOSIS — R01.1 HEART MURMUR ON PHYSICAL EXAMINATION: ICD-10-CM

## 2018-06-06 DIAGNOSIS — R03.0 ELEVATED BLOOD PRESSURE READING WITHOUT DIAGNOSIS OF HYPERTENSION: ICD-10-CM

## 2018-06-06 DIAGNOSIS — E78.5 HYPERLIPIDEMIA, UNSPECIFIED HYPERLIPIDEMIA TYPE: ICD-10-CM

## 2018-06-06 DIAGNOSIS — F17.200 TOBACCO DEPENDENCE: ICD-10-CM

## 2018-06-06 DIAGNOSIS — Z00.00 ENCOUNTER FOR MEDICAL EXAMINATION TO ESTABLISH CARE: ICD-10-CM

## 2018-06-06 DIAGNOSIS — Z23 NEED FOR DIPHTHERIA-TETANUS-PERTUSSIS (TDAP) VACCINE: ICD-10-CM

## 2018-06-06 DIAGNOSIS — I35.0 AORTIC VALVE STENOSIS, ETIOLOGY OF CARDIAC VALVE DISEASE UNSPECIFIED: ICD-10-CM

## 2018-06-06 DIAGNOSIS — Z91.89 EXCESSIVE CONSUMPTION OF SODA POP: ICD-10-CM

## 2018-06-06 DIAGNOSIS — Z11.59 NEED FOR HEPATITIS C SCREENING TEST: ICD-10-CM

## 2018-06-06 PROBLEM — I65.22 LEFT CAROTID ARTERY STENOSIS: Status: ACTIVE | Noted: 2017-10-26

## 2018-06-06 PROCEDURE — 99999 PR PBB SHADOW E&M-EST. PATIENT-LVL V: CPT | Mod: PBBFAC,,, | Performed by: FAMILY MEDICINE

## 2018-06-06 PROCEDURE — 71100 X-RAY EXAM RIBS UNI 2 VIEWS: CPT | Mod: TC,FY,PO

## 2018-06-06 PROCEDURE — 99386 PREV VISIT NEW AGE 40-64: CPT | Mod: S$GLB,,, | Performed by: FAMILY MEDICINE

## 2018-06-06 PROCEDURE — 99215 OFFICE O/P EST HI 40 MIN: CPT | Mod: PBBFAC,PO,25 | Performed by: FAMILY MEDICINE

## 2018-06-06 PROCEDURE — 71100 X-RAY EXAM RIBS UNI 2 VIEWS: CPT | Mod: 26,,, | Performed by: INTERNAL MEDICINE

## 2018-06-06 RX ORDER — IBUPROFEN 600 MG/1
600 TABLET ORAL
Qty: 42 TABLET | Refills: 0 | Status: SHIPPED | OUTPATIENT
Start: 2018-06-06 | End: 2018-06-20

## 2018-06-06 RX ORDER — ATORVASTATIN CALCIUM 10 MG/1
10 TABLET, FILM COATED ORAL DAILY
Qty: 90 TABLET | Refills: 3 | Status: SHIPPED | OUTPATIENT
Start: 2018-06-06 | End: 2018-09-06 | Stop reason: SDUPTHER

## 2018-06-06 RX ORDER — NAPROXEN SODIUM 220 MG/1
81 TABLET, FILM COATED ORAL DAILY
Start: 2018-06-06 | End: 2023-09-11

## 2018-06-06 NOTE — PROGRESS NOTES
Subjective:       Patient ID: Elissa Raymond is a 63 y.o. female.    Chief Complaint: Annual Exam    Elissa Raymond is a 63 y.o. female who presents today to establish care.     Diet: they eat out a lot but are trying to stop. She drinks soda, almost daily. She drinks juice rarely. She drinks coffee, 2-3 cups/daily, with cream and sugar.   Exercise: she loved walking, but has not gone since her rib fracture.     She is continuing to have rib pain s/p her recent injury. She takes ibuprofen and naproxen for this and this helps a little. She takes narcotics when the pain is too much.     Tdap: declined  Labs: ordered    C-scope: desires Fit Kit  Mammogram: ordered  Pap: s/p hysterectomy    PMHx: reviewed in EMR and updated  Meds: reviewed in EMR and updated  Shx: reviewed in EMR and updated  Social: lives with Arpit, also a patient of mine. There are two dogs at home. She is the only smoker at home. She has two kids, they are is [place].         Review of Systems   Constitutional: Negative for chills, fever and unexpected weight change.   Respiratory: Negative for cough and shortness of breath.    Cardiovascular: Negative for chest pain.   Gastrointestinal: Positive for constipation. Negative for diarrhea, nausea and vomiting.   Genitourinary: Negative for difficulty urinating and dyspareunia.   Skin: Negative for rash.         Health Maintenance Due   Topic Date Due    Hepatitis C Screening  1955    Fecal Occult Blood Test (FOBT)/FitKit  1955    TETANUS VACCINE  04/05/1972    Pneumococcal PPSV23 (Medium Risk) (1) 04/05/1972    Mammogram  04/05/1994    Zoster Vaccine  04/05/2014     Objective:     Vitals:    06/06/18 1426   BP: 135/60   Pulse: 65   Temp: 97.7 °F (36.5 °C)        Physical Exam   Constitutional: She is oriented to person, place, and time. She appears well-developed and well-nourished.   HENT:   Head: Normocephalic and atraumatic.   Right Ear: Tympanic membrane, external ear and  ear canal normal.   Left Ear: Tympanic membrane, external ear and ear canal normal.   Nose: Nose normal.   Mouth/Throat: Oropharynx is clear and moist and mucous membranes are normal.   Eyes: Conjunctivae are normal. Pupils are equal, round, and reactive to light.   Neck: Normal range of motion.   Cardiovascular: Regular rhythm.    Murmur heard.   Systolic murmur is present with a grade of 4/6   4/6 murmur, greatest at the aortic listening post   Pulmonary/Chest: Effort normal and breath sounds normal. No respiratory distress.   Abdominal: Soft. Bowel sounds are normal. She exhibits no distension.   Genitourinary:   Genitourinary Comments: deferred    Musculoskeletal: She exhibits no edema.   Neurological: She is alert and oriented to person, place, and time. No cranial nerve deficit or sensory deficit. She exhibits normal muscle tone.   Skin: Skin is warm and dry.   Psychiatric: She has a normal mood and affect. Her speech is normal and behavior is normal. Judgment and thought content normal.   Nursing note and vitals reviewed.      Assessment:       1. Well woman exam    2. Encounter for medical examination to establish care    3. Elevated blood pressure reading without diagnosis of hypertension    4. Hyperlipidemia, unspecified hyperlipidemia type    5. History of CVA (cerebrovascular accident)    6. Tobacco dependence    7. Closed fracture of one rib of left side with delayed healing, subsequent encounter    8. Screening for malignant neoplasm of breast    9. Need for hepatitis C screening test    10. Need for diphtheria-tetanus-pertussis (Tdap) vaccine    11. Colon cancer screening    12. Excessive consumption of soda pop    13. Macular degeneration of both eyes, unspecified type    14. Heart murmur on physical examination    15. Aortic valve stenosis, etiology of cardiac valve disease unspecified    16. BMI 25.0-25.9,adult    17. Hearing loss, unspecified hearing loss type, unspecified laterality        Plan:          Well woman exam  ?Avoid tobacco  ?Be physically active  ?Maintain a healthy weight  ?Eat a diet rich in fruits, vegetables, and whole grains, and low in saturated/trans fat  ?Limit alcohol consumption  ?Protect against sexually transmitted infections  ?Avoid excess sun  -     CBC auto differential; Future; Expected date: 06/06/2018  -     Comprehensive metabolic panel; Future; Expected date: 06/06/2018  -     Hemoglobin A1c; Future; Expected date: 06/06/2018  -     Lipid panel; Future; Expected date: 06/06/2018  -     TSH; Future; Expected date: 06/06/2018  -     Vitamin D; Future; Expected date: 06/06/2018    Encounter for medical examination to establish care    Elevated blood pressure reading without diagnosis of hypertension  Recheck is normal  Stop smoking  Was on ACE/HCTZ in the past  Stopped taking it  Digital HTN  Ambulatory monitoring  -     atorvastatin (LIPITOR) 10 MG tablet; Take 1 tablet (10 mg total) by mouth once daily.  Dispense: 90 tablet; Refill: 3  -     NURSING COMMUNICATION: Create [facility] Account  -     Hypertension Cardback Medicine ([facility]) Enrollment Order  -     Hypertension Digital Medicine ([facility]): Assign Onboarding Questionnaires    Hyperlipidemia, unspecified hyperlipidemia type  Start aspirin and statin given history of CVA. She was taking both previously but stopped  -     aspirin 81 MG Chew; Take 1 tablet (81 mg total) by mouth once daily.    History of CVA (cerebrovascular accident)  Start aspirin and statin given history of CVA. She was taking both previously but stopped  -     atorvastatin (LIPITOR) 10 MG tablet; Take 1 tablet (10 mg total) by mouth once daily.  Dispense: 90 tablet; Refill: 3  -     aspirin 81 MG Chew; Take 1 tablet (81 mg total) by mouth once daily.    Tobacco dependence  -     Ambulatory referral to Smoking Cessation Program  -     atorvastatin (LIPITOR) 10 MG tablet; Take 1 tablet (10 mg total) by mouth once daily.  Dispense: 90 tablet; Refill:  3    Closed fracture of one rib of left side with delayed healing, subsequent encounter  Recheck rib XR given continued pain  Explained that this may persist for 12 weeks  -     X-Ray Ribs 2 View Left; Future; Expected date: 06/06/2018  -     ibuprofen (ADVIL,MOTRIN) 600 MG tablet; Take 1 tablet (600 mg total) by mouth 3 (three) times daily with meals.  Dispense: 42 tablet; Refill: 0    Screening for malignant neoplasm of breast  -     Mammo Digital Screening Bilat with CAD; Future; Expected date: 06/06/2018    Need for hepatitis C screening test  -     Hepatitis C antibody; Future; Expected date: 06/06/2018    Need for diphtheria-tetanus-pertussis (Tdap) vaccine  Declined    Colon cancer screening  -     Fecal Immunochemical Test (iFOBT); Future; Expected date: 06/06/2018    Excessive consumption of soda pop    Macular degeneration of both eyes, unspecified type  -     Ambulatory Referral to Ophthalmology    Heart murmur on physical examination  -     2D Echo w/ Color Flow Doppler; Future    Aortic valve stenosis, etiology of cardiac valve disease unspecified  -     2D Echo w/ Color Flow Doppler; Future    BMI 25.0-25.9,adult  See AVS    Hearing loss, unspecified hearing loss type, unspecified laterality  Declined ENT/Audiology referral    Follow up in 3 months for HTN

## 2018-06-06 NOTE — PATIENT INSTRUCTIONS
Low-Salt Diet  This diet removes foods that are high in salt. It also limits the amount of salt you use when cooking. It is most often used for people with high blood pressure, edema (fluid retention), and kidney, liver, or heart disease.  Table salt contains the mineral sodium. Your body needs sodium to work normally. But too much sodium can make your health problems worse. Your healthcare provider is recommending a low-salt (also called low-sodium) diet for you. Your total daily allowance of salt is 1,500 to 2,300 milligrams (mg). It is less than 1 teaspoon of table salt. This means you can have only about 500 to 700 mg of sodium at each meal. People with certain health problems should limit salt intake to the lower end of the recommended range.    When you cook, dont add much salt. If you can cook without using salt, even better. Dont add salt to your food at the table.  When shopping, read food labels. Salt is often called sodium on the label. Choose foods that are salt-free, low salt, or very low salt. Note that foods with reduced salt may not lower your salt intake enough.    Beans, potatoes, and pasta  Ok: Dry beans, split peas, lentils, potatoes, rice, macaroni, pasta, spaghetti without added salt  Avoid: Potato chips, tortilla chips, and similar products  Breads and cereals  Ok: Low-sodium breads, rolls, cereals, and cakes; low-salt crackers, matzo crackers  Avoid: Salted crackers, pretzels, popcorn, [place] toast, pancakes, muffins  Dairy  Ok: Milk, chocolate milk, hot chocolate mix, low-salt cheeses, and yogurt  Avoid: Processed cheese and cheese spreads; Roquefort, Camembert, and cottage cheese; buttermilk, instant breakfast drink  Desserts  Ok: Ice cream, frozen yogurt, juice bars, gelatin, cookies and pies, sugar, honey, jelly, hard candy  Avoid: Most pies, cakes and cookies prepared or processed with salt; instant pudding  Drinks  Ok: Tea, coffee, fizzy (carbonated) drinks, juices  Avoid: Flavored  coffees, electrolyte replacement drinks, sports drinks  Meats  Ok: All fresh meat, fish, poultry, low-salt tuna, eggs, egg substitute  Avoid: Smoked, pickled, brine-cured, or salted meats and fish. This includes mejia, chipped beef, corned beef, hot dogs, deli meats, ham, kosher meats, salt pork, sausage, canned tuna, salted codfish, smoked salmon, herring, sardines, or anchovies.  Seasonings and spices  Ok: Most seasonings are okay. Good substitutes for salt include: fresh herb blends, hot sauce, lemon, garlic, marrero, vinegar, dry mustard, parsley, cilantro, horseradish, tomato paste, regular margarine, mayonnaise, unsalted butter, cream cheese, vegetable oil, cream, low-salt salad dressing and gravy.  Avoid: Regular ketchup, relishes, pickles, soy sauce, teriyaki sauce, Worcestershire sauce, BBQ sauce, tartar sauce, meat tenderizer, chili sauce, regular gravy, regular salad dressing, salted butter  Soups  Ok: Low-salt soups and broths made with allowed foods  Avoid: Bouillon cubes, soups with smoked or salted meats, regular soup and broth  Vegetables  Ok: Most vegetables are okay; also low-salt tomato and vegetable juices  Avoid: Sauerkraut and other brine-soaked vegetables; pickles and other pickled vegetables; tomato juice, olives  Date Last Reviewed: 8/1/2016 © 2000-2017 [employer]. [address] [place] [address]. All rights reserved. This information is not intended as a substitute for professional medical care. Always follow your healthcare professional's instructions.        4 Steps for Eating Healthier  Changing the way you eat can improve your health. It can lower your cholesterol and blood pressure, and help you stay at a healthy weight. Your diet doesnt have to be bland and boring to be healthy. Just watch your calories and follow these steps:    1. Eat fewer unhealthy fats  · Choose more fish and lean meats instead of fatty cuts of meat.  · Skip butter and lard, and use less  margarine.  · Pass on foods that have palm, coconut, or hydrogenated oils.  · Eat fewer high-fat dairy foods like cheese, ice cream, and whole milk.  · Get a heart-healthy cookbook and try some low-fat recipes.  2. Go light on salt  · Keep the saltshaker off the table.  · Limit high-salt ingredients, such as soy sauce, bouillon, and garlic salt.  · Instead of adding salt when cooking, season your food with herbs and flavorings. Try lemon, garlic, and onion.  · Limit convenience foods, such as boxed or canned foods and restaurant food.  · Read food labels and choose lower-sodium options.  3. Limit sugar  · Pause before you add sugars to pancakes, cereal, coffee, or tea. This includes white and brown table sugar, syrup, honey, and molasses. Cut your usual amount by half.  · Use non-sugar sweeteners. Stevia, aspartame, and sucralose can satisfy a sweet tooth without adding calories.  · Swap out sugar-filled soda and other drinks. Buy sugar-free or low-calorie beverages. Remember water is always the best choice.  · Read labels and choose foods with less added sugar. Keep in mind that dairy foods and foods with fruit will have some natural sugar.  · Cut the sugar in recipes by 1/3 to 1/2. Boost the flavor with extracts like almond, vanilla, or orange. Or add spices such as cinnamon or nutmeg.  4. Eat more fiber  · Eat fresh fruits and vegetables every day.  · Boost your diet with whole grains. Go for oats, whole-grain rice, and bran.  · Add beans and lentils to your meals.  · Drink more water to match your fiber increase. This is to help prevent constipation.  Date Last Reviewed: 5/11/2015  © 0852-1825 [employer]. [address] [place] [address], [place] [address]. All rights reserved. This information is not intended as a substitute for professional medical care. Always follow your healthcare professional's instructions.        Understanding Fat and Cholesterol  Too much cholesterol in your blood can lead to many  problems such as blocked arteries. This can lead to heart attack and stroke. One of the best ways to manage heart and blood vessel disease is to lower your blood cholesterol. Planning meals that are low in saturated fat and cholesterol helps reduce the level of cholesterol in your blood. Below are eating tips to help lower your blood cholesterol levels.  Eat less fat  A healthy goal is to have less than 25% to 35% of your daily calories come from fat. Instead of fats, eat more fruits, whole-grains, and vegetables. This also helps control your weight, and can even reduce your risk for some cancers. There are different kinds of fats in foods. Fats can be saturated, unsaturated, or trans fats. The best fats to choose are unsaturated fats. But fats are high in calories, so eat even unsaturated fats sparingly.  Limit foods high in saturated fats  Saturated fats come from animals and certain plants (such as coconut and palm). Eating too much saturated fat can raise your blood cholesterol levels and make your artery problems worse. Your goal is to eat less saturated fat. Below are some examples of foods that contain lots of saturated fat:  · Fatty cuts of meat (lamb, ham, beef)  · Many pastries, cakes, cookies, and candies  · Cream, ice cream, sour cream, cheese, and butter, and foods made with them  · Sauces made with butter or cream  · Salad dressings with saturated fats  · Foods that contain palm or coconut oil  Choose unsaturated fats  Unsaturated fats are usually liquid at room temperature. They are better choices for your heart than saturated fat. There are two types of unsaturated fats: polyunsaturated fat and monounsaturated fat. Aim to replace saturated fats with polyunsaturated or monounsaturated fats.  · Polyunsaturated fats are found in corn oil, safflower oil, sunflower oil, and other vegetable oils.  · Monounsaturated fats are found in olive oil, canola oil, and peanut oil. Some margarines and spreads are now  made with these oils, too. Avocados are also high in monounsaturated fat.  Of all fats, monounsaturated fats are the least harmful to your heart.  Avoid trans fats  Like saturated fats, trans fats have been linked to heart disease. Even a small amount can harm your health. Trans fats are found in liquid oils that have been changed to be solid at room temperature. Margarine, which is often made from vegetable oil, is one example. Vegetable shortening is another. Trans fats are often found in packaged goods. Check ingredients for the words hydrogenated or partially hydrogenated. They mean the foods contain trans fat.  What about triglycerides?  Triglycerides are a type of fat in your blood. Like cholesterol, high levels of triglycerides can lead to blocked arteries. High triglyceride levels can be reduced 20% to 50%  by limiting added sugars in your diet, susbstituting healthier fats for saturated and trans fats, getting more physical activity, and losing weight if your are overweight. You may also be advised to avoid or limi alcohol.    Reading food labels  Luckily, most foods now have labels giving you the facts about what youre eating. Reading food labels helps you make healthy choices. Look for the words highlighted below.  · Serving Size. This is the amount of food in 1 serving. If you eat larger portions, be sure to count more of everything: fat, calories, and cholesterol.  · Total Fat. Tells you how many grams (g) of fat are in 1 serving.  · Calories from Fat. This tells you the total number of calories from fat in 1 serving (there are 9 calories per gram of fat). Look for foods with the fewest calories from fat.  · Saturated Fat. Tells you how many grams (g) of saturated fat are in 1 serving.  · Trans Fat. Tells how many grams (g) of trans fat are in 1 serving.  · Cholesterol. Tells you how many milligrams (mg) of cholesterol are in 1 serving.  Date Last Reviewed: 5/11/2015  © 6416-9521 [employer]  [employer]. [address] [place] [address]. All rights reserved. This information is not intended as a substitute for professional medical care. Always follow your healthcare professional's instructions.      Low-Salt Diet  This diet removes foods that are high in salt. It also limits the amount of salt you use when cooking. It is most often used for people with high blood pressure, edema (fluid retention), and kidney, liver, or heart disease.  Table salt contains the mineral sodium. Your body needs sodium to work normally. But too much sodium can make your health problems worse. Your healthcare provider is recommending a low-salt (also called low-sodium) diet for you. Your total daily allowance of salt is 1,500 to 2,300 milligrams (mg). It is less than 1 teaspoon of table salt. This means you can have only about 500 to 700 mg of sodium at each meal. People with certain health problems should limit salt intake to the lower end of the recommended range.    When you cook, dont add much salt. If you can cook without using salt, even better. Dont add salt to your food at the table.  When shopping, read food labels. Salt is often called sodium on the label. Choose foods that are salt-free, low salt, or very low salt. Note that foods with reduced salt may not lower your salt intake enough.    Beans, potatoes, and pasta  Ok: Dry beans, split peas, lentils, potatoes, rice, macaroni, pasta, spaghetti without added salt  Avoid: Potato chips, tortilla chips, and similar products  Breads and cereals  Ok: Low-sodium breads, rolls, cereals, and cakes; low-salt crackers, matzo crackers  Avoid: Salted crackers, pretzels, popcorn, Taiwanese toast, pancakes, muffins  Dairy  Ok: Milk, chocolate milk, hot chocolate mix, low-salt cheeses, and yogurt  Avoid: Processed cheese and cheese spreads; Roquefort, Camembert, and cottage cheese; buttermilk, instant breakfast drink  Desserts  Ok: Ice cream, frozen yogurt, juice bars,  gelatin, cookies and pies, sugar, honey, jelly, hard candy  Avoid: Most pies, cakes and cookies prepared or processed with salt; instant pudding  Drinks  Ok: Tea, coffee, fizzy (carbonated) drinks, juices  Avoid: Flavored coffees, electrolyte replacement drinks, sports drinks  Meats  Ok: All fresh meat, fish, poultry, low-salt tuna, eggs, egg substitute  Avoid: Smoked, pickled, brine-cured, or salted meats and fish. This includes mejia, chipped beef, corned beef, hot dogs, deli meats, ham, kosher meats, salt pork, sausage, canned tuna, salted codfish, smoked salmon, herring, sardines, or anchovies.  Seasonings and spices  Ok: Most seasonings are okay. Good substitutes for salt include: fresh herb blends, hot sauce, lemon, garlic, marrero, vinegar, dry mustard, parsley, cilantro, horseradish, tomato paste, regular margarine, mayonnaise, unsalted butter, cream cheese, vegetable oil, cream, low-salt salad dressing and gravy.  Avoid: Regular ketchup, relishes, pickles, soy sauce, teriyaki sauce, Worcestershire sauce, BBQ sauce, tartar sauce, meat tenderizer, chili sauce, regular gravy, regular salad dressing, salted butter  Soups  Ok: Low-salt soups and broths made with allowed foods  Avoid: Bouillon cubes, soups with smoked or salted meats, regular soup and broth  Vegetables  Ok: Most vegetables are okay; also low-salt tomato and vegetable juices  Avoid: Sauerkraut and other brine-soaked vegetables; pickles and other pickled vegetables; tomato juice, olives  Date Last Reviewed: 8/1/2016 © 2000-2017 [employer]. [address] [place] [address], [place] [address]. All rights reserved. This information is not intended as a substitute for professional medical care. Always follow your healthcare professional's instructions.        4 Steps for Eating Healthier  Changing the way you eat can improve your health. It can lower your cholesterol and blood pressure, and help you stay at a healthy weight. Your diet doesnt have  to be bland and boring to be healthy. Just watch your calories and follow these steps:    1. Eat fewer unhealthy fats  · Choose more fish and lean meats instead of fatty cuts of meat.  · Skip butter and lard, and use less margarine.  · Pass on foods that have palm, coconut, or hydrogenated oils.  · Eat fewer high-fat dairy foods like cheese, ice cream, and whole milk.  · Get a heart-healthy cookbook and try some low-fat recipes.  2. Go light on salt  · Keep the saltshaker off the table.  · Limit high-salt ingredients, such as soy sauce, bouillon, and garlic salt.  · Instead of adding salt when cooking, season your food with herbs and flavorings. Try lemon, garlic, and onion.  · Limit convenience foods, such as boxed or canned foods and restaurant food.  · Read food labels and choose lower-sodium options.  3. Limit sugar  · Pause before you add sugars to pancakes, cereal, coffee, or tea. This includes white and brown table sugar, syrup, honey, and molasses. Cut your usual amount by half.  · Use non-sugar sweeteners. Stevia, aspartame, and sucralose can satisfy a sweet tooth without adding calories.  · Swap out sugar-filled soda and other drinks. Buy sugar-free or low-calorie beverages. Remember water is always the best choice.  · Read labels and choose foods with less added sugar. Keep in mind that dairy foods and foods with fruit will have some natural sugar.  · Cut the sugar in recipes by 1/3 to 1/2. Boost the flavor with extracts like almond, vanilla, or orange. Or add spices such as cinnamon or nutmeg.  4. Eat more fiber  · Eat fresh fruits and vegetables every day.  · Boost your diet with whole grains. Go for oats, whole-grain rice, and bran.  · Add beans and lentils to your meals.  · Drink more water to match your fiber increase. This is to help prevent constipation.  Date Last Reviewed: 5/11/2015  © 3131-2248 [employer]. [address] [place] [address], [place] [address]. All rights reserved. This  information is not intended as a substitute for professional medical care. Always follow your healthcare professional's instructions.        Understanding Fat and Cholesterol  Too much cholesterol in your blood can lead to many problems such as blocked arteries. This can lead to heart attack and stroke. One of the best ways to manage heart and blood vessel disease is to lower your blood cholesterol. Planning meals that are low in saturated fat and cholesterol helps reduce the level of cholesterol in your blood. Below are eating tips to help lower your blood cholesterol levels.  Eat less fat  A healthy goal is to have less than 25% to 35% of your daily calories come from fat. Instead of fats, eat more fruits, whole-grains, and vegetables. This also helps control your weight, and can even reduce your risk for some cancers. There are different kinds of fats in foods. Fats can be saturated, unsaturated, or trans fats. The best fats to choose are unsaturated fats. But fats are high in calories, so eat even unsaturated fats sparingly.  Limit foods high in saturated fats  Saturated fats come from animals and certain plants (such as coconut and palm). Eating too much saturated fat can raise your blood cholesterol levels and make your artery problems worse. Your goal is to eat less saturated fat. Below are some examples of foods that contain lots of saturated fat:  · Fatty cuts of meat (lamb, ham, beef)  · Many pastries, cakes, cookies, and candies  · Cream, ice cream, sour cream, cheese, and butter, and foods made with them  · Sauces made with butter or cream  · Salad dressings with saturated fats  · Foods that contain palm or coconut oil  Choose unsaturated fats  Unsaturated fats are usually liquid at room temperature. They are better choices for your heart than saturated fat. There are two types of unsaturated fats: polyunsaturated fat and monounsaturated fat. Aim to replace saturated fats with polyunsaturated or  monounsaturated fats.  · Polyunsaturated fats are found in corn oil, safflower oil, sunflower oil, and other vegetable oils.  · Monounsaturated fats are found in olive oil, canola oil, and peanut oil. Some margarines and spreads are now made with these oils, too. Avocados are also high in monounsaturated fat.  Of all fats, monounsaturated fats are the least harmful to your heart.  Avoid trans fats  Like saturated fats, trans fats have been linked to heart disease. Even a small amount can harm your health. Trans fats are found in liquid oils that have been changed to be solid at room temperature. Margarine, which is often made from vegetable oil, is one example. Vegetable shortening is another. Trans fats are often found in packaged goods. Check ingredients for the words hydrogenated or partially hydrogenated. They mean the foods contain trans fat.  What about triglycerides?  Triglycerides are a type of fat in your blood. Like cholesterol, high levels of triglycerides can lead to blocked arteries. High triglyceride levels can be reduced 20% to 50%  by limiting added sugars in your diet, susbstituting healthier fats for saturated and trans fats, getting more physical activity, and losing weight if your are overweight. You may also be advised to avoid or limi alcohol.    Reading food labels  Luckily, most foods now have labels giving you the facts about what youre eating. Reading food labels helps you make healthy choices. Look for the words highlighted below.  · Serving Size. This is the amount of food in 1 serving. If you eat larger portions, be sure to count more of everything: fat, calories, and cholesterol.  · Total Fat. Tells you how many grams (g) of fat are in 1 serving.  · Calories from Fat. This tells you the total number of calories from fat in 1 serving (there are 9 calories per gram of fat). Look for foods with the fewest calories from fat.  · Saturated Fat. Tells you how many grams (g) of saturated  fat are in 1 serving.  · Trans Fat. Tells how many grams (g) of trans fat are in 1 serving.  · Cholesterol. Tells you how many milligrams (mg) of cholesterol are in 1 serving.  Date Last Reviewed: 5/11/2015  © 7685-5753 [employer]. [address] [place] [address]. All rights reserved. This information is not intended as a substitute for professional medical care. Always follow your healthcare professional's instructions.    Small sustainable changes  Increase intake of fiber  Decrease carb intake  Decrease drinks with added sugar such as soda or juice  3 meals a day made up of the following:  Unlimited green vegetables, tomatoes, mushrooms, spaghetti squash, cauliflower, meat, poultry, seafood, eggs and hard cheeses.   Milk and plain yogurt  Dressings, seasonings, condiments, etc should have less than 2 g sugars.   beans or nuts can have 1 x a day.   1-2 servings of citrus fruits, berries, pineapple or melon a day (1/2 cup)  Avoid fried foods  No grains, rice, pasta, potatoes, bread, corn, peas, oatmeal, grits, tortillas, crackers, chips  Increase exercise  Handouts given  Consider myfitnesspal nancy  Follow up in 3 months

## 2018-06-07 ENCOUNTER — TELEPHONE (OUTPATIENT)
Dept: FAMILY MEDICINE | Facility: CLINIC | Age: 63
End: 2018-06-07

## 2018-06-07 ENCOUNTER — PATIENT MESSAGE (OUTPATIENT)
Dept: FAMILY MEDICINE | Facility: CLINIC | Age: 63
End: 2018-06-07

## 2018-06-07 DIAGNOSIS — R79.89 LOW VITAMIN D LEVEL: Primary | ICD-10-CM

## 2018-06-07 DIAGNOSIS — D72.829 LEUKOCYTOSIS, UNSPECIFIED TYPE: ICD-10-CM

## 2018-06-07 RX ORDER — ERGOCALCIFEROL 1.25 MG/1
50000 CAPSULE ORAL
Qty: 12 CAPSULE | Refills: 0 | Status: SHIPPED | OUTPATIENT
Start: 2018-06-07 | End: 2018-07-06 | Stop reason: SDUPTHER

## 2018-06-11 ENCOUNTER — PATIENT MESSAGE (OUTPATIENT)
Dept: ADMINISTRATIVE | Facility: OTHER | Age: 63
End: 2018-06-11

## 2018-06-13 ENCOUNTER — LAB VISIT (OUTPATIENT)
Dept: LAB | Facility: HOSPITAL | Age: 63
End: 2018-06-13
Attending: FAMILY MEDICINE
Payer: MEDICARE

## 2018-06-13 DIAGNOSIS — Z12.11 COLON CANCER SCREENING: ICD-10-CM

## 2018-06-13 LAB — HEMOCCULT STL QL IA: NEGATIVE

## 2018-06-13 PROCEDURE — 82274 ASSAY TEST FOR BLOOD FECAL: CPT

## 2018-07-06 DIAGNOSIS — R79.89 LOW VITAMIN D LEVEL: ICD-10-CM

## 2018-07-06 RX ORDER — ERGOCALCIFEROL 1.25 MG/1
50000 CAPSULE ORAL
Qty: 12 CAPSULE | Refills: 0 | Status: SHIPPED | OUTPATIENT
Start: 2018-07-06 | End: 2018-09-06 | Stop reason: SDUPTHER

## 2018-07-09 ENCOUNTER — PATIENT MESSAGE (OUTPATIENT)
Dept: OPTOMETRY | Facility: CLINIC | Age: 63
End: 2018-07-09

## 2018-07-17 ENCOUNTER — OFFICE VISIT (OUTPATIENT)
Dept: URGENT CARE | Facility: CLINIC | Age: 63
End: 2018-07-17
Payer: MEDICARE

## 2018-07-17 VITALS
OXYGEN SATURATION: 90 % | WEIGHT: 135 LBS | TEMPERATURE: 98 F | SYSTOLIC BLOOD PRESSURE: 164 MMHG | BODY MASS INDEX: 24.84 KG/M2 | DIASTOLIC BLOOD PRESSURE: 76 MMHG | RESPIRATION RATE: 18 BRPM | HEART RATE: 82 BPM | HEIGHT: 62 IN

## 2018-07-17 DIAGNOSIS — J40 BRONCHITIS: Primary | ICD-10-CM

## 2018-07-17 DIAGNOSIS — J98.01 ACUTE BRONCHOSPASM: ICD-10-CM

## 2018-07-17 PROCEDURE — 96372 THER/PROPH/DIAG INJ SC/IM: CPT | Mod: 59,S$GLB,, | Performed by: PHYSICIAN ASSISTANT

## 2018-07-17 PROCEDURE — 3008F BODY MASS INDEX DOCD: CPT | Mod: CPTII,S$GLB,, | Performed by: PHYSICIAN ASSISTANT

## 2018-07-17 PROCEDURE — 99214 OFFICE O/P EST MOD 30 MIN: CPT | Mod: 25,S$GLB,, | Performed by: PHYSICIAN ASSISTANT

## 2018-07-17 PROCEDURE — 94640 AIRWAY INHALATION TREATMENT: CPT | Mod: S$GLB,,, | Performed by: PHYSICIAN ASSISTANT

## 2018-07-17 RX ORDER — ALBUTEROL SULFATE 90 UG/1
2 AEROSOL, METERED RESPIRATORY (INHALATION) EVERY 6 HOURS PRN
Qty: 18 G | Refills: 0 | Status: SHIPPED | OUTPATIENT
Start: 2018-07-17 | End: 2019-07-17

## 2018-07-17 RX ORDER — METHYLPREDNISOLONE 4 MG/1
4 TABLET ORAL DAILY
Qty: 1 PACKAGE | Refills: 0 | Status: SHIPPED | OUTPATIENT
Start: 2018-07-17 | End: 2018-09-06

## 2018-07-17 RX ORDER — DOXYCYCLINE 100 MG/1
100 CAPSULE ORAL 2 TIMES DAILY
Qty: 14 CAPSULE | Refills: 0 | Status: SHIPPED | OUTPATIENT
Start: 2018-07-17 | End: 2018-07-24

## 2018-07-17 RX ORDER — ALBUTEROL SULFATE 0.83 MG/ML
2.5 SOLUTION RESPIRATORY (INHALATION)
Status: COMPLETED | OUTPATIENT
Start: 2018-07-17 | End: 2018-07-17

## 2018-07-17 RX ADMIN — ALBUTEROL SULFATE 2.5 MG: 0.83 SOLUTION RESPIRATORY (INHALATION) at 06:07

## 2018-07-17 NOTE — PROGRESS NOTES
"Subjective:       Patient ID: Elissa Raymond is a 63 y.o. female.    Vitals:  height is 5' 2" (1.575 m) and weight is 61.2 kg (135 lb). Her temperature is 98.1 °F (36.7 °C). Her blood pressure is 164/76 (abnormal) and her pulse is 82. Her respiration is 18 and oxygen saturation is 90% (abnormal).     Chief Complaint: Cough and Shortness of Breath    Cough   This is a new problem. The current episode started in the past 7 days. The problem has been gradually worsening. The problem occurs every few minutes. Associated symptoms include a fever, nasal congestion and shortness of breath. Pertinent negatives include no chest pain, chills, ear pain, eye redness, headaches, myalgias, sore throat or wheezing. Nothing aggravates the symptoms. Treatments tried: Mucinex. The treatment provided no relief.   Shortness of Breath   Associated symptoms include a fever and sputum production. Pertinent negatives include no abdominal pain, chest pain, ear pain, headaches, leg swelling, sore throat or wheezing.     Review of Systems   Constitution: Positive for fever and malaise/fatigue. Negative for chills.   HENT: Positive for congestion. Negative for ear pain, hoarse voice and sore throat.    Eyes: Negative for discharge and redness.   Cardiovascular: Negative for chest pain, dyspnea on exertion and leg swelling.   Respiratory: Positive for cough, shortness of breath and sputum production. Negative for wheezing.    Musculoskeletal: Negative for myalgias.   Gastrointestinal: Negative for abdominal pain and nausea.   Neurological: Negative for headaches.       Objective:      Physical Exam   Constitutional: She is oriented to person, place, and time. She appears well-developed and well-nourished. She does not appear ill. No distress.   HENT:   Head: Normocephalic and atraumatic.   Right Ear: External ear normal.   Left Ear: External ear normal.   Nose: Nose normal.   Eyes: Conjunctivae, EOM and lids are normal. Right eye exhibits no " discharge. Left eye exhibits no discharge.   Neck: Normal range of motion. Neck supple.   Cardiovascular: Normal rate, regular rhythm and normal heart sounds.  Exam reveals no gallop and no friction rub.    No murmur heard.  Pulmonary/Chest: She has decreased breath sounds (lower lungs bilateral). She has wheezes (diffuse; bilateral). She has rhonchi (diffuse; bilateral). She has no rales.   Unable to take a deep breath and short of breath prior to breathing treatment; pt able to breathe easier after breathing treatment and lung sounds improved, but not completely   Musculoskeletal: Normal range of motion.   Neurological: She is alert and oriented to person, place, and time.   Skin: Skin is warm and dry. No rash noted. She is not diaphoretic. No erythema. No pallor.   Psychiatric: She has a normal mood and affect. Her behavior is normal.   Nursing note and vitals reviewed.      Assessment:       1. Bronchitis    2. Acute bronchospasm      X-ray Chest Pa And Lateral    Result Date: 7/17/2018  EXAMINATION: XR CHEST PA AND LATERAL CLINICAL HISTORY: Cough TECHNIQUE: PA and lateral views of the chest were performed. COMPARISON: 06/06/2018, 05/20/2018. FINDINGS: Cardiac silhouette is normal in size.  Lungs are hyperinflated with mild flattening of the diaphragms which may be seen in setting of COPD.  No evidence of new focal consolidative process, pneumothorax, or significant effusion.  Redemonstration of left posterior lateral fifth rib fracture with suspected callus formation about the fracture site.  Otherwise no acute osseous abnormality identified.     Possible COPD, otherwise no acute cardiopulmonary process identified. Electronically signed by: Rena Soliman MD Date:    07/17/2018 Time:    18:24    Plan:       ER precautions given to patient. Discussed treatment options with patient. Patient expressed verbal understanding and agreement with treatment plan.     Bronchitis  -     X-Ray Chest PA And Lateral; Future;  Expected date: 07/17/2018  -     albuterol nebulizer solution 2.5 mg; Take 3 mLs (2.5 mg total) by nebulization one time.  -     methylPREDNISolone sod suc(PF) injection 125 mg; Inject 125 mg into the vein one time.  -     methylPREDNISolone (MEDROL DOSEPACK) 4 mg tablet; Take 1 tablet (4 mg total) by mouth once daily. use as directed  Dispense: 1 Package; Refill: 0  -     albuterol 90 mcg/actuation inhaler; Inhale 2 puffs into the lungs every 6 (six) hours as needed for Wheezing. Rescue  Dispense: 18 g; Refill: 0  -     doxycycline (VIBRAMYCIN) 100 MG Cap; Take 1 capsule (100 mg total) by mouth 2 (two) times daily. for 7 days  Dispense: 14 capsule; Refill: 0    Acute bronchospasm  -     albuterol nebulizer solution 2.5 mg; Take 3 mLs (2.5 mg total) by nebulization one time.      Patient Instructions   -Please take antibiotic to completion.  -Use inhaler as needed for cough/wheezing.  -Begin taking oral steroid tomorrow as prescribed.  -Please follow up with your primary care provider.    Please follow up with your primary care provider within 2-5 days if your signs and symptoms have not resolved or worsen.     If your condition worsens or fails to improve we recommend that you receive another evaluation at the emergency room immediately or contact your primary medical clinic to discuss your concerns.   You must understand that you have received an Urgent Care treatment only and that you may be released before all of your medical problems are known or treated. You, the patient, will arrange for follow up care as instructed.         Bronchitis with Wheezing (Viral or Bacterial: Adult)    Bronchitis is an infection of the air passages. It often occurs during a cold and is usually caused by a virus. Symptoms include cough with mucus (phlegm) and low-grade fever. This illness is contagious during the first few days and is spread through the air by coughing and sneezing, or by direct contact (touching the sick person and  then touching your own eyes, nose, or mouth).  If there is a lot of inflammation, air flow is restricted. The air passages may also go into spasm, especially if you have asthma. This causes wheezing and difficulty breathing even in people who do not have asthma.  Bronchitis usually lasts 7 to 14 days. The wheezing should improve with treatment during the first week. An inhaler is often prescribed to relax the air passages and stop wheezing. Antibiotics will be prescribed if your doctor thinks there is also a secondary bacterial infection.  Home care  · If symptoms are severe, rest at home for the first 2 to 3 days. When you go back to your usual activities, don't let yourself get too tired.  · Do not smoke. Also avoid being exposed to secondhand smoke.  · You may use over-the-counter medicine to control fever or pain, unless another medicine was prescribed. Note: If you have chronic liver or kidney disease or have ever had a stomach ulcer or gastrointestinal bleeding, talk with your healthcare provider before using these medicines. Also talk to your provider if you are taking medicine to prevent blood clots.) Aspirin should never be given to anyone younger than 18 years of age who is ill with a viral infection or fever. It may cause severe liver or brain damage.  · Your appetite may be poor, so a light diet is fine. Avoid dehydration by drinking 6 to 8 glasses of fluids per day (such as water, soft drinks, sports drinks, juices, tea, or soup). Extra fluids will help loosen secretions in the nose and lungs.  · Over-the-counter cough, cold, and sore-throat medicines will not shorten the length of the illness, but they may be helpful to reduce symptoms. (Note: Do not use decongestants if you have high blood pressure.)  · If you were given an inhaler, use it exactly as directed. If you need to use it more often than prescribed, your condition may be worsening. If this happens, contact your healthcare provider.  · If  prescribed, finish all antibiotic medicine, even if you are feeling better after only a few days.  Follow-up care  Follow up with your healthcare provider, or as advised. If you had an X-ray or ECG (electrocardiogram), a specialist will review it. You will be notified of any new findings that may affect your care.  Note: If you are age 65 or older, or if you have a chronic lung disease or condition that affects your immune system, or you smoke, talk to your healthcare provider about having a pneumococcal vaccinations and a yearly influenza vaccination (flu shot).  When to seek medical advice  Call your healthcare provider right away if any of these occur:  · Fever of 100.4°F (38°C) or higher  · Coughing up increasing amounts of colored sputum  · Weakness, drowsiness, headache, facial pain, ear pain, or a stiff neck  Call 911, or get immediate medical care  Contact emergency services right away if any of these occur.  · Coughing up blood  · Worsening weakness, drowsiness, headache, or stiff neck  · Increased wheezing not helped with medication, shortness of breath, or pain with breathing  Date Last Reviewed: 9/13/2015  © 7085-2258 [employer]. [address] [place] [address], [place] [address]. All rights reserved. This information is not intended as a substitute for professional medical care. Always follow your healthcare professional's instructions.

## 2018-09-06 ENCOUNTER — LAB VISIT (OUTPATIENT)
Dept: LAB | Facility: HOSPITAL | Age: 63
End: 2018-09-06
Attending: FAMILY MEDICINE
Payer: MEDICARE

## 2018-09-06 ENCOUNTER — OFFICE VISIT (OUTPATIENT)
Dept: FAMILY MEDICINE | Facility: CLINIC | Age: 63
End: 2018-09-06
Payer: MEDICARE

## 2018-09-06 VITALS
WEIGHT: 136.44 LBS | TEMPERATURE: 98 F | OXYGEN SATURATION: 95 % | HEART RATE: 71 BPM | HEIGHT: 62 IN | BODY MASS INDEX: 25.11 KG/M2

## 2018-09-06 DIAGNOSIS — D72.829 LEUKOCYTOSIS, UNSPECIFIED TYPE: ICD-10-CM

## 2018-09-06 DIAGNOSIS — F17.200 TOBACCO DEPENDENCE: ICD-10-CM

## 2018-09-06 DIAGNOSIS — Z12.2 ENCOUNTER FOR SCREENING FOR LUNG CANCER: ICD-10-CM

## 2018-09-06 DIAGNOSIS — Z12.9 SCREENING FOR CANCER: ICD-10-CM

## 2018-09-06 DIAGNOSIS — H54.40 BLIND LEFT EYE: ICD-10-CM

## 2018-09-06 DIAGNOSIS — R03.0 ELEVATED BLOOD PRESSURE READING: Primary | ICD-10-CM

## 2018-09-06 DIAGNOSIS — Z86.73 HISTORY OF CVA (CEREBROVASCULAR ACCIDENT): ICD-10-CM

## 2018-09-06 DIAGNOSIS — H35.30 MACULAR DEGENERATION OF BOTH EYES, UNSPECIFIED TYPE: ICD-10-CM

## 2018-09-06 DIAGNOSIS — E78.5 HYPERLIPIDEMIA, UNSPECIFIED HYPERLIPIDEMIA TYPE: ICD-10-CM

## 2018-09-06 DIAGNOSIS — R79.89 LOW VITAMIN D LEVEL: ICD-10-CM

## 2018-09-06 DIAGNOSIS — S22.32XG CLOSED FRACTURE OF ONE RIB OF LEFT SIDE WITH DELAYED HEALING: ICD-10-CM

## 2018-09-06 PROBLEM — Z72.0 TOBACCO USE: Status: RESOLVED | Noted: 2017-11-02 | Resolved: 2018-09-06

## 2018-09-06 LAB
BASOPHILS # BLD AUTO: 0.1 K/UL
BASOPHILS NFR BLD: 0.9 %
DIFFERENTIAL METHOD: ABNORMAL
EOSINOPHIL # BLD AUTO: 0.8 K/UL
EOSINOPHIL NFR BLD: 7 %
ERYTHROCYTE [DISTWIDTH] IN BLOOD BY AUTOMATED COUNT: 13.1 %
HCT VFR BLD AUTO: 45.8 %
HGB BLD-MCNC: 14.9 G/DL
IMM GRANULOCYTES # BLD AUTO: 0.02 K/UL
IMM GRANULOCYTES NFR BLD AUTO: 0.2 %
LYMPHOCYTES # BLD AUTO: 3.2 K/UL
LYMPHOCYTES NFR BLD: 29.3 %
MCH RBC QN AUTO: 30.5 PG
MCHC RBC AUTO-ENTMCNC: 32.5 G/DL
MCV RBC AUTO: 94 FL
MONOCYTES # BLD AUTO: 0.8 K/UL
MONOCYTES NFR BLD: 7.7 %
NEUTROPHILS # BLD AUTO: 6 K/UL
NEUTROPHILS NFR BLD: 54.9 %
NRBC BLD-RTO: 0 /100 WBC
PLATELET # BLD AUTO: 187 K/UL
PMV BLD AUTO: 12.5 FL
RBC # BLD AUTO: 4.88 M/UL
WBC # BLD AUTO: 10.98 K/UL

## 2018-09-06 PROCEDURE — 99214 OFFICE O/P EST MOD 30 MIN: CPT | Mod: S$PBB,,, | Performed by: FAMILY MEDICINE

## 2018-09-06 PROCEDURE — 36415 COLL VENOUS BLD VENIPUNCTURE: CPT | Mod: PO

## 2018-09-06 PROCEDURE — 99999 PR PBB SHADOW E&M-EST. PATIENT-LVL IV: CPT | Mod: PBBFAC,,, | Performed by: FAMILY MEDICINE

## 2018-09-06 PROCEDURE — 3008F BODY MASS INDEX DOCD: CPT | Mod: CPTII,,, | Performed by: FAMILY MEDICINE

## 2018-09-06 PROCEDURE — 99214 OFFICE O/P EST MOD 30 MIN: CPT | Mod: PBBFAC,PO | Performed by: FAMILY MEDICINE

## 2018-09-06 PROCEDURE — 85025 COMPLETE CBC W/AUTO DIFF WBC: CPT

## 2018-09-06 RX ORDER — ATORVASTATIN CALCIUM 10 MG/1
10 TABLET, FILM COATED ORAL DAILY
Qty: 90 TABLET | Refills: 3 | Status: SHIPPED | OUTPATIENT
Start: 2018-09-06 | End: 2023-09-11 | Stop reason: SDUPTHER

## 2018-09-06 RX ORDER — ERGOCALCIFEROL 1.25 MG/1
50000 CAPSULE ORAL
Qty: 12 CAPSULE | Refills: 0 | Status: SHIPPED | OUTPATIENT
Start: 2018-09-06

## 2018-09-06 NOTE — PATIENT INSTRUCTIONS
You have low vitamin D and a Rx has been sent to your pharmacy. Take this pill once a week and when the prescription and refills are done, start taking an OTC vitamin D supplementation daily.     Continue statin  Continue aspirin  Low dose CT screening  BP appears normal

## 2018-09-06 NOTE — PROGRESS NOTES
Subjective:       Patient ID: Elissa Raymond is a 63 y.o. female.    Chief Complaint: Follow-up and Hypertension    Elissa is a 63 y.o. female who presents today to follow up on possible hypertension, prevenatitve care follow up, and her other chronic issues.  The last visit she was noted to have increased blood pressures.  She was also noted to have a rib fracture.  I believe that her elevated pressures were secondary to pain and this appointment was made to follow-up.  Her rib fractures seem to have healed.  Pain is currently much better.  Certain motions still seem to trigger it, but that is getting less and less.  Her blood pressures appear to be completely stable today.  She is completely asymptomatic in regards to her blood pressure.  She is still smoking and has not started smoking cessation.    She did not get her mammogram because of the pain from her rib fracture.    She does report getting and pneumonia shot but she is not sure when.   She is agreeable to getting her other vaccinations.  An order was given for her to receive these at the [facility] pharmacy.     In addition, The USPSTF recommends annual screening for lung cancer with low-dose computed tomography (LDCT) in adults aged 55 to 80 years who have a 30 pack-year smoking history and currently smoke or have quit within the past 15 years. Screening should be discontinued once a person has not smoked for 15 years or develops a health problem that substantially limits life expectancy or the ability or willingness to have curative lung surgery. This was discussed with the patient and she is agreeable to undergoing screening, if it is covered by her insurance.      Hypertension   This is a new problem. The current episode started more than 1 month ago. The problem has been rapidly improving since onset. The problem is controlled. Pertinent negatives include no anxiety, blurred vision, chest pain, headaches, malaise/fatigue, neck pain, orthopnea,  palpitations, peripheral edema, PND, shortness of breath or sweats. There are no associated agents to hypertension. Risk factors for coronary artery disease include smoking/tobacco exposure and stress. Past treatments include lifestyle changes. The current treatment provides significant improvement. There are no compliance problems.      Review of Systems   Constitutional: Negative for chills, fever and malaise/fatigue.   Eyes: Negative for blurred vision.   Respiratory: Negative for cough, chest tightness and shortness of breath.    Cardiovascular: Negative for chest pain, palpitations, orthopnea and PND.   Musculoskeletal: Negative for neck pain.   Neurological: Negative for dizziness, light-headedness and headaches.           Objective:     Vitals:    09/06/18 1501   Pulse: 71   Temp: 97.8 °F (36.6 °C)        Physical Exam   Constitutional: She is oriented to person, place, and time. She appears well-developed and well-nourished.   HENT:   Head: Normocephalic and atraumatic.   Right Ear: Tympanic membrane and ear canal normal.   Left Ear: Tympanic membrane and ear canal normal.   Mouth/Throat: Mucous membranes are normal.   Neck: Normal range of motion.   Cardiovascular: Regular rhythm.   Murmur heard.   Systolic murmur is present with a grade of 4/6.  4/6 murmur, greatest at the aortic listening post   Pulmonary/Chest: Effort normal. No respiratory distress. She has wheezes.   Wheezing in all lung fields  No distress   Genitourinary:   Genitourinary Comments: deferred    Musculoskeletal: She exhibits no edema.   Neurological: She is alert and oriented to person, place, and time. No cranial nerve deficit.   Skin: Skin is warm.   Psychiatric: She has a normal mood and affect. Her speech is normal and behavior is normal. Judgment and thought content normal.   Nursing note and vitals reviewed.      Assessment:       1. Elevated blood pressure reading    2. Hyperlipidemia, unspecified hyperlipidemia type    3.  Encounter for screening for lung cancer    4. Screening for cancer    5. BMI 25.0-25.9,adult    6. Closed fracture of one rib of left side with delayed healing    7. History of CVA (cerebrovascular accident)    8. Tobacco dependence    9. Low vitamin D level    10. Macular degeneration of both eyes, unspecified type    11. Blind left eye        Plan:       Although she is wheezing, she is not symptomatic. No further workup today. BP issues have resolved. Lung cancer screening ordered. Desires referral for macular degeneration; this was ordered for her.     Pre contemplation phase for smoking cessation.     Elevated blood pressure reading  Resolved  2/2 to pain    Hyperlipidemia, unspecified hyperlipidemia type  Continue statin  -     atorvastatin (LIPITOR) 10 MG tablet; Take 1 tablet (10 mg total) by mouth once daily.  Dispense: 90 tablet; Refill: 3    Encounter for screening for lung cancer/Screening for cancer  The USPSTF recommends annual screening for lung cancer with low-dose computed tomography (LDCT) in adults aged 55 to 80 years who have a 30 pack-year smoking history and currently smoke or have quit within the past 15 years. Screening should be discontinued once a person has not smoked for 15 years or develops a health problem that substantially limits life expectancy or the ability or willingness to have curative lung surgery. Patient would like screening today  -     CT Chest Lung Screening Low Dose; Future; Expected date: 09/06/2018    BMI 25.0-25.9,adult  Noted    Closed fracture of one rib of left side with delayed healing  Healed    History of CVA (cerebrovascular accident)  Aspirin and statin  -     atorvastatin (LIPITOR) 10 MG tablet; Take 1 tablet (10 mg total) by mouth once daily.  Dispense: 90 tablet; Refill: 3    Tobacco dependence  Continue statin  Smoking cessation referral was been given  -     atorvastatin (LIPITOR) 10 MG tablet; Take 1 tablet (10 mg total) by mouth once daily.  Dispense: 90  tablet; Refill: 3    Low vitamin D level  You have low vitamin D and a Rx has been sent to your pharmacy. Take this pill once a week and when the prescription and refills are done, start taking an OTC vitamin D supplementation daily.   -     ergocalciferol (ERGOCALCIFEROL) 50,000 unit Cap; Take 1 capsule (50,000 Units total) by mouth every 7 days.  Dispense: 12 capsule; Refill: 0    Macular degeneration of both eyes, unspecified type  -     Ambulatory Referral to Ophthalmology    Blind left eye      Warning signs discussed, patient to call with any further issues or worsening of symptoms.

## 2018-09-21 ENCOUNTER — OFFICE VISIT (OUTPATIENT)
Dept: OPTOMETRY | Facility: CLINIC | Age: 63
End: 2018-09-21
Payer: MEDICARE

## 2018-09-21 DIAGNOSIS — Z13.5 GLAUCOMA SCREENING: ICD-10-CM

## 2018-09-21 DIAGNOSIS — H35.3112 INTERMEDIATE STAGE NONEXUDATIVE AGE-RELATED MACULAR DEGENERATION OF RIGHT EYE: Primary | ICD-10-CM

## 2018-09-21 DIAGNOSIS — H35.3221 EXUDATIVE AGE-RELATED MACULAR DEGENERATION OF LEFT EYE WITH ACTIVE CHOROIDAL NEOVASCULARIZATION: ICD-10-CM

## 2018-09-21 PROCEDURE — 99999 PR PBB SHADOW E&M-EST. PATIENT-LVL II: CPT | Mod: PBBFAC,,, | Performed by: OPTOMETRIST

## 2018-09-21 PROCEDURE — 92004 COMPRE OPH EXAM NEW PT 1/>: CPT | Mod: S$PBB,,, | Performed by: OPTOMETRIST

## 2018-09-21 PROCEDURE — 99212 OFFICE O/P EST SF 10 MIN: CPT | Mod: PBBFAC,PO | Performed by: OPTOMETRIST

## 2018-09-21 NOTE — PROGRESS NOTES
HPI     MITZY: 1 year ago  Pt states va in the right eye is blurry, states readers +2.00 does not   help when reading small print. States only peripheral va OS.   Occasional flashes and floaters, states floaters are a little more   frequent OU. States when eyes are close its normally dark, but than at   times pt sees red.  +Headaches    No gtts    Hx CAT Sx OU  Wet AMD OU---states injection were to painful so stopped going to Retina   appointments a year ago    Glaucoma---Mother/Sister  Mac Deg---Mother/Sister/Brother x2    Last edited by Rickie Pate, OD on 9/21/2018 11:01 AM. (History)        ROS     Positive for: Eyes (cat surgery Ou/ARMD OU/inj hx ou)    Negative for: Constitutional, Gastrointestinal, Neurological, Skin,   Genitourinary, Musculoskeletal, HENT, Endocrine, Cardiovascular,   Respiratory, Psychiatric, Allergic/Imm, Heme/Lymph    Last edited by Rickie Pate, OD on 9/21/2018 11:01 AM. (History)        Assessment /Plan     For exam results, see Encounter Report.    Intermediate stage nonexudative age-related macular degeneration of right eye    Exudative age-related macular degeneration of left eye with active choroidal neovascularization    Glaucoma screening      1. Sp cat surgery OU--pt happy w otc readers  2. Hx ARMD/injections OU from outside doctor. Pt reports injections so painful that she stopped going to retina specialist a year ago.  Presents today because VA OS getting worse.  todays exam shows RPE mottling OD with good VA, OS has hemes/exudates in mac--suspect CNVM    PLAN:    Retinal consult--will have Dr Gonzalez review case with patient--pt feels her problems may have been from the topical anesthesia used vs the injectable itself.  She wishes to have laser instead of injection if possible.

## 2018-10-25 ENCOUNTER — INITIAL CONSULT (OUTPATIENT)
Dept: OPHTHALMOLOGY | Facility: CLINIC | Age: 63
End: 2018-10-25
Payer: MEDICARE

## 2018-10-25 VITALS — DIASTOLIC BLOOD PRESSURE: 76 MMHG | HEART RATE: 60 BPM | SYSTOLIC BLOOD PRESSURE: 125 MMHG

## 2018-10-25 DIAGNOSIS — H35.3231 EXUDATIVE AGE-RELATED MACULAR DEGENERATION OF BOTH EYES WITH ACTIVE CHOROIDAL NEOVASCULARIZATION: Primary | ICD-10-CM

## 2018-10-25 DIAGNOSIS — H35.3112 INTERMEDIATE STAGE NONEXUDATIVE AGE-RELATED MACULAR DEGENERATION OF RIGHT EYE: ICD-10-CM

## 2018-10-25 DIAGNOSIS — H43.393 FLOATERS, BILATERAL: ICD-10-CM

## 2018-10-25 DIAGNOSIS — H35.3221 EXUDATIVE AGE-RELATED MACULAR DEGENERATION OF LEFT EYE WITH ACTIVE CHOROIDAL NEOVASCULARIZATION: ICD-10-CM

## 2018-10-25 PROCEDURE — 92134 CPTRZ OPH DX IMG PST SGM RTA: CPT | Mod: PBBFAC,PO | Performed by: OPHTHALMOLOGY

## 2018-10-25 PROCEDURE — 92014 COMPRE OPH EXAM EST PT 1/>: CPT | Mod: 25,S$PBB,, | Performed by: OPHTHALMOLOGY

## 2018-10-25 PROCEDURE — 99999 PR PBB SHADOW E&M-EST. PATIENT-LVL III: CPT | Mod: PBBFAC,,, | Performed by: OPHTHALMOLOGY

## 2018-10-25 PROCEDURE — C9257 BEVACIZUMAB INJECTION: HCPCS | Mod: PBBFAC,JG,PO | Performed by: OPHTHALMOLOGY

## 2018-10-25 PROCEDURE — 67028 INJECTION EYE DRUG: CPT | Mod: S$PBB,RT,, | Performed by: OPHTHALMOLOGY

## 2018-10-25 PROCEDURE — 99213 OFFICE O/P EST LOW 20 MIN: CPT | Mod: PBBFAC,PO,25 | Performed by: OPHTHALMOLOGY

## 2018-10-25 PROCEDURE — 67028 INJECTION EYE DRUG: CPT | Mod: PBBFAC,PO,RT | Performed by: OPHTHALMOLOGY

## 2018-10-25 RX ADMIN — BEVACIZUMAB 1.25 MG: 100 INJECTION, SOLUTION INTRAVENOUS at 11:10

## 2018-10-25 NOTE — LETTER
October 25, 2018      Rickie Pate, OD  [address]  [place] [address]           [place] [facility]  [address] [facility]  [place] [address]  Phone: [phone]  Fax: [phone]          Patient: Elissa Raymond   MR Number: [id number]   YOB: 1955   Date of Visit: 10/25/2018       Dear Dr. Rickie Pate:    Thank you for referring Elissa Raymond to me for evaluation. Attached you will find relevant portions of my assessment and plan of care.    If you have questions, please do not hesitate to call me. I look forward to following Elissa Raymond along with you.    Sincerely,    TEVIN Gonzalez MD    Enclosure  CC:  No Recipients    If you would like to receive this communication electronically, please contact [email or web address] or [phone] to request more information on [employer] Link access.    For providers and/or their staff who would like to refer a patient to [facility], please contact us through our one-stop-shop provider referral line, [facility], at [phone].    If you feel you have received this communication in error or would no longer like to receive these types of communications, please e-mail [email or web address]

## 2018-10-25 NOTE — PROGRESS NOTES
HPI     Macular Degeneration      Additional comments: AMD Eval- Dr. Pate              Comments     Hx CAT Sx OU  Wet AMD OU---states injection were to painful so stopped going to Retina appointments a year ago     Glaucoma---Mother/Sister  Mac Deg---Mother/Sister/Brother x2           Last edited by Basil Wright on 10/25/2018 10:12 AM. (History)        OCT - OD SRF  OS - fibrosis and heme      A/P    1. Wet AMD OU  S/p multiple injections OU with Germer last 8/17  Possible  component OD - may consider PDT if minimal response to anti-VEGF    Had a lot of pain with injections, so she stopped going    Avastin OD today    GIven cicatrix and poor Va OS - try observation    2. PCIOL OU    3. Floaters OU      1 month OCT    Risks, benefits, and alternatives to treatment discussed in detail with the patient.  The patient voiced understanding and wished to proceed with the procedure    Injection Procedure Note:  Diagnosis: Wet AMD OD    Patient Identified and Time Out complete  Topical Proparacaine and Betadine. Conj Prep only  Inject Avastin OD at 6:00 @ 3.5-4mm posterior to limbus  Post Operative Dx: Same  Complications: None  Follow up as above.

## 2019-08-02 DIAGNOSIS — Z12.11 COLON CANCER SCREENING: ICD-10-CM

## 2019-08-12 NOTE — PATIENT INSTRUCTIONS

## 2019-09-03 DIAGNOSIS — Z12.39 BREAST CANCER SCREENING: ICD-10-CM

## 2019-09-20 DIAGNOSIS — M79.671 RIGHT FOOT PAIN: Primary | ICD-10-CM

## 2019-09-23 ENCOUNTER — HOSPITAL ENCOUNTER (OUTPATIENT)
Dept: RADIOLOGY | Facility: HOSPITAL | Age: 64
Discharge: HOME | End: 2019-09-23
Attending: ORTHOPAEDIC SURGERY
Payer: MEDICARE

## 2019-09-23 ENCOUNTER — OFFICE VISIT (OUTPATIENT)
Dept: ORTHOPEDICS | Facility: CLINIC | Age: 64
End: 2019-09-23
Payer: MEDICARE

## 2019-09-23 VITALS — BODY MASS INDEX: 25.11 KG/M2 | WEIGHT: 136.44 LBS | HEIGHT: 62 IN

## 2019-09-23 DIAGNOSIS — M79.671 RIGHT FOOT PAIN: ICD-10-CM

## 2019-09-23 DIAGNOSIS — S92.514A CLOSED NONDISPLACED FRACTURE OF PROXIMAL PHALANX OF LESSER TOE OF RIGHT FOOT, INITIAL ENCOUNTER: Primary | ICD-10-CM

## 2019-09-23 PROCEDURE — 1101F PT FALLS ASSESS-DOCD LE1/YR: CPT | Mod: CPTII,S$GLB,, | Performed by: ORTHOPAEDIC SURGERY

## 2019-09-23 PROCEDURE — 3008F BODY MASS INDEX DOCD: CPT | Mod: CPTII,S$GLB,, | Performed by: ORTHOPAEDIC SURGERY

## 2019-09-23 PROCEDURE — 3008F PR BODY MASS INDEX (BMI) DOCUMENTED: ICD-10-PCS | Mod: CPTII,S$GLB,, | Performed by: ORTHOPAEDIC SURGERY

## 2019-09-23 PROCEDURE — 28510 TREATMENT OF TOE FRACTURE: CPT | Mod: T9,S$GLB,, | Performed by: ORTHOPAEDIC SURGERY

## 2019-09-23 PROCEDURE — 1101F PR PT FALLS ASSESS DOC 0-1 FALLS W/OUT INJ PAST YR: ICD-10-PCS | Mod: CPTII,S$GLB,, | Performed by: ORTHOPAEDIC SURGERY

## 2019-09-23 PROCEDURE — 99204 OFFICE O/P NEW MOD 45 MIN: CPT | Mod: 57,S$GLB,, | Performed by: ORTHOPAEDIC SURGERY

## 2019-09-23 PROCEDURE — 99204 PR OFFICE/OUTPT VISIT, NEW, LEVL IV, 45-59 MIN: ICD-10-PCS | Mod: 57,S$GLB,, | Performed by: ORTHOPAEDIC SURGERY

## 2019-09-23 PROCEDURE — 28510 PR CLOSED RX TOE FX: ICD-10-PCS | Mod: T9,S$GLB,, | Performed by: ORTHOPAEDIC SURGERY

## 2019-09-23 PROCEDURE — 73630 XR FOOT COMPLETE 3 VIEW RIGHT: ICD-10-PCS | Mod: 26,RT,, | Performed by: RADIOLOGY

## 2019-09-23 PROCEDURE — 99999 PR PBB SHADOW E&M-EST. PATIENT-LVL II: ICD-10-PCS | Mod: PBBFAC,,, | Performed by: ORTHOPAEDIC SURGERY

## 2019-09-23 PROCEDURE — 73630 X-RAY EXAM OF FOOT: CPT | Mod: TC,PN,RT

## 2019-09-23 PROCEDURE — 99999 PR PBB SHADOW E&M-EST. PATIENT-LVL II: CPT | Mod: PBBFAC,,, | Performed by: ORTHOPAEDIC SURGERY

## 2019-09-23 PROCEDURE — 73630 X-RAY EXAM OF FOOT: CPT | Mod: 26,RT,, | Performed by: RADIOLOGY

## 2019-09-23 RX ORDER — HYDROCHLOROTHIAZIDE 25 MG/1
25 TABLET ORAL
COMMUNITY
Start: 2019-09-19 | End: 2023-11-15

## 2020-10-04 NOTE — PATIENT INSTRUCTIONS
-Please take antibiotic to completion.  -Use inhaler as needed for cough/wheezing.  -Begin taking oral steroid tomorrow as prescribed.  -Please follow up with your primary care provider.    Please follow up with your primary care provider within 2-5 days if your signs and symptoms have not resolved or worsen.     If your condition worsens or fails to improve we recommend that you receive another evaluation at the emergency room immediately or contact your primary medical clinic to discuss your concerns.   You must understand that you have received an Urgent Care treatment only and that you may be released before all of your medical problems are known or treated. You, the patient, will arrange for follow up care as instructed.         Bronchitis with Wheezing (Viral or Bacterial: Adult)    Bronchitis is an infection of the air passages. It often occurs during a cold and is usually caused by a virus. Symptoms include cough with mucus (phlegm) and low-grade fever. This illness is contagious during the first few days and is spread through the air by coughing and sneezing, or by direct contact (touching the sick person and then touching your own eyes, nose, or mouth).  If there is a lot of inflammation, air flow is restricted. The air passages may also go into spasm, especially if you have asthma. This causes wheezing and difficulty breathing even in people who do not have asthma.  Bronchitis usually lasts 7 to 14 days. The wheezing should improve with treatment during the first week. An inhaler is often prescribed to relax the air passages and stop wheezing. Antibiotics will be prescribed if your doctor thinks there is also a secondary bacterial infection.  Home care  · If symptoms are severe, rest at home for the first 2 to 3 days. When you go back to your usual activities, don't let yourself get too tired.  · Do not smoke. Also avoid being exposed to secondhand smoke.  · You may use over-the-counter medicine to  OB/GYN control fever or pain, unless another medicine was prescribed. Note: If you have chronic liver or kidney disease or have ever had a stomach ulcer or gastrointestinal bleeding, talk with your healthcare provider before using these medicines. Also talk to your provider if you are taking medicine to prevent blood clots.) Aspirin should never be given to anyone younger than 18 years of age who is ill with a viral infection or fever. It may cause severe liver or brain damage.  · Your appetite may be poor, so a light diet is fine. Avoid dehydration by drinking 6 to 8 glasses of fluids per day (such as water, soft drinks, sports drinks, juices, tea, or soup). Extra fluids will help loosen secretions in the nose and lungs.  · Over-the-counter cough, cold, and sore-throat medicines will not shorten the length of the illness, but they may be helpful to reduce symptoms. (Note: Do not use decongestants if you have high blood pressure.)  · If you were given an inhaler, use it exactly as directed. If you need to use it more often than prescribed, your condition may be worsening. If this happens, contact your healthcare provider.  · If prescribed, finish all antibiotic medicine, even if you are feeling better after only a few days.  Follow-up care  Follow up with your healthcare provider, or as advised. If you had an X-ray or ECG (electrocardiogram), a specialist will review it. You will be notified of any new findings that may affect your care.  Note: If you are age 65 or older, or if you have a chronic lung disease or condition that affects your immune system, or you smoke, talk to your healthcare provider about having a pneumococcal vaccinations and a yearly influenza vaccination (flu shot).  When to seek medical advice  Call your healthcare provider right away if any of these occur:  · Fever of 100.4°F (38°C) or higher  · Coughing up increasing amounts of colored sputum  · Weakness, drowsiness, headache, facial pain, ear pain,  or a stiff neck  Call 911, or get immediate medical care  Contact emergency services right away if any of these occur.  · Coughing up blood  · Worsening weakness, drowsiness, headache, or stiff neck  · Increased wheezing not helped with medication, shortness of breath, or pain with breathing  Date Last Reviewed: 9/13/2015  © 2220-2626 [employer] [address] [place] [address]. All rights reserved. This information is not intended as a substitute for professional medical care. Always follow your healthcare professional's instructions.

## 2020-10-05 ENCOUNTER — PATIENT MESSAGE (OUTPATIENT)
Dept: ADMINISTRATIVE | Facility: HOSPITAL | Age: 65
End: 2020-10-05

## 2021-01-05 ENCOUNTER — PATIENT MESSAGE (OUTPATIENT)
Dept: ADMINISTRATIVE | Facility: HOSPITAL | Age: 66
End: 2021-01-05

## 2021-04-05 ENCOUNTER — PATIENT MESSAGE (OUTPATIENT)
Dept: ADMINISTRATIVE | Facility: HOSPITAL | Age: 66
End: 2021-04-05

## 2021-07-06 ENCOUNTER — PATIENT MESSAGE (OUTPATIENT)
Dept: ADMINISTRATIVE | Facility: HOSPITAL | Age: 66
End: 2021-07-06

## 2022-01-05 ENCOUNTER — OFFICE VISIT (OUTPATIENT)
Dept: CARDIOLOGY | Facility: CLINIC | Age: 67
End: 2022-01-05
Payer: MEDICARE

## 2022-01-05 VITALS
HEART RATE: 88 BPM | BODY MASS INDEX: 26.5 KG/M2 | DIASTOLIC BLOOD PRESSURE: 62 MMHG | WEIGHT: 144 LBS | HEIGHT: 62 IN | OXYGEN SATURATION: 94 % | SYSTOLIC BLOOD PRESSURE: 122 MMHG

## 2022-01-05 DIAGNOSIS — I35.0 SEVERE AORTIC STENOSIS: ICD-10-CM

## 2022-01-05 DIAGNOSIS — F17.200 TOBACCO DEPENDENCE: Primary | ICD-10-CM

## 2022-01-05 PROCEDURE — 99204 OFFICE O/P NEW MOD 45 MIN: CPT | Mod: S$GLB,,, | Performed by: INTERNAL MEDICINE

## 2022-01-05 PROCEDURE — 99204 PR OFFICE/OUTPT VISIT, NEW, LEVL IV, 45-59 MIN: ICD-10-PCS | Mod: S$GLB,,, | Performed by: INTERNAL MEDICINE

## 2022-01-05 RX ORDER — BENZONATATE 200 MG/1
200 CAPSULE ORAL
COMMUNITY
Start: 2022-01-04 | End: 2022-01-11

## 2022-01-05 RX ORDER — ASPIRIN 325 MG
50000 TABLET, DELAYED RELEASE (ENTERIC COATED) ORAL
COMMUNITY
Start: 2021-05-19

## 2022-01-05 RX ORDER — CODEINE PHOSPHATE AND GUAIFENESIN 10; 100 MG/5ML; MG/5ML
5 SOLUTION ORAL
COMMUNITY
Start: 2022-01-04 | End: 2022-01-11

## 2022-01-05 RX ORDER — PREDNISONE 20 MG/1
20 TABLET ORAL
COMMUNITY
Start: 2022-01-04 | End: 2022-01-09

## 2022-01-05 RX ORDER — IPRATROPIUM BROMIDE AND ALBUTEROL SULFATE 2.5; .5 MG/3ML; MG/3ML
3 SOLUTION RESPIRATORY (INHALATION) EVERY 6 HOURS PRN
COMMUNITY
End: 2023-11-15 | Stop reason: SDUPTHER

## 2022-01-05 RX ORDER — METOPROLOL SUCCINATE 25 MG/1
25 TABLET, EXTENDED RELEASE ORAL
COMMUNITY
Start: 2021-11-08 | End: 2023-11-15

## 2022-01-05 RX ORDER — BENZONATATE 200 MG/1
200 CAPSULE ORAL 3 TIMES DAILY PRN
COMMUNITY
End: 2023-11-15

## 2022-01-05 NOTE — PROGRESS NOTES
[facility]    Subjective:     Patient ID:  Elissa Raymond is a 66 y.o. female patient here for evaluation Establish Care (New Patient , needs a new heart valve. Heart murmur . )      HPI: 65 yo female with symptoms of dyspnea with minimal exertion here to establish care. She has been worked up [facility] for these symptoms and was found to have severe AS and regurgitation with non obstructive coronaries. She is here for a 2nd opinion and is interested in pursuing non surgical options.    Review of Systems   All other systems reviewed and are negative.       Past Medical History:   Diagnosis Date    Hyperlipidemia     Hypertension     Stroke     x 3    Tobacco use 11/2/2017       Past Surgical History:   Procedure Laterality Date    BLADDER SURGERY      CATARACT EXTRACTION      CHOLECYSTECTOMY      HYSTERECTOMY      KNEE SURGERY Left     TONSILLECTOMY         Family History   Problem Relation Age of Onset    Diabetes Mother     Kidney disease Mother     Glaucoma Mother     Macular degeneration Mother     Cancer Father     Glaucoma Sister     Macular degeneration Sister     Macular degeneration Brother        Social History     Socioeconomic History    Marital status:    Tobacco Use    Smoking status: Current Every Day Smoker     Packs/day: 1.00     Years: 46.00     Pack years: 46.00     Types: Cigarettes    Smokeless tobacco: Never Used   Substance and Sexual Activity    Alcohol use: Yes     Comment: once a month or less    Drug use: No    Sexual activity: Yes     Partners: Male     Birth control/protection: None   Social History Narrative    6/6/18: lives with Arpit, also a patient of mine. There are two dogs at home. She is the only smoker at home. She has two kids, they are is [place].        Current Outpatient Medications   Medication Sig Dispense Refill    albuterol 90 mcg/actuation inhaler Inhale 2 puffs into the lungs every 6 (six)  hours as needed for Wheezing. Rescue 18 g 0    albuterol-ipratropium (DUO-NEB) 2.5 mg-0.5 mg/3 mL nebulizer solution Take 3 mLs by nebulization every 6 (six) hours as needed for Wheezing. Rescue      aspirin 81 MG Chew Take 1 tablet (81 mg total) by mouth once daily.      benzonatate (TESSALON) 200 MG capsule Take 200 mg by mouth 3 (three) times daily as needed for Cough.      benzonatate (TESSALON) 200 MG capsule Take 200 mg by mouth.      cholecalciferol, vitamin D3, 1,250 mcg (50,000 unit) capsule Take 50,000 Units by mouth.      ergocalciferol (ERGOCALCIFEROL) 50,000 unit Cap Take 1 capsule (50,000 Units total) by mouth every 7 days. 12 capsule 0    guaiFENesin-codeine 100-10 mg/5 ml (TUSSI-ORGANIDIN NR)  mg/5 mL syrup Take 5 mLs by mouth.      metoprolol succinate (TOPROL-XL) 25 MG 24 hr tablet Take 25 mg by mouth.      OXYGEN-AIR DELIVERY SYSTEMS MISC by [facility].(Non-Drug; Combo Route) route. 3 L by nasal route      predniSONE (DELTASONE) 20 MG tablet Take 20 mg by mouth.      atorvastatin (LIPITOR) 10 MG tablet Take 1 tablet (10 mg total) by mouth once daily. (Patient not taking: Reported on 1/5/2022) 90 tablet 3    hydroCHLOROthiazide (HYDRODIURIL) 25 MG tablet Take 25 mg by mouth.       No current facility-administered medications for this visit.       Review of patient's allergies indicates:   Allergen Reactions    Pcn [penicillins] Other (See Comments)     Unable to obtain         Objective:        Vitals:    01/05/22 1418   BP: 122/62   Pulse: 88       Physical Exam  Vitals reviewed.   Constitutional:       Appearance: Normal appearance.   HENT:      Mouth/Throat:      Mouth: Mucous membranes are moist.   Eyes:      Extraocular Movements: Extraocular movements intact.      Pupils: Pupils are equal, round, and reactive to light.   Cardiovascular:      Rate and Rhythm: Normal rate and regular rhythm.      Pulses: Normal pulses.      Heart sounds: Murmur heard.   No gallop.    Pulmonary:       Effort: Pulmonary effort is normal.      Comments: Poor air entry b/l  Abdominal:      General: Bowel sounds are normal.      Palpations: Abdomen is soft.   Musculoskeletal:         General: Normal range of motion.   Skin:     General: Skin is warm and dry.   Neurological:      General: No focal deficit present.      Mental Status: She is alert and oriented to person, place, and time.   Psychiatric:         Mood and Affect: Mood normal.         LIPIDS - LAST 2   Lab Results   Component Value Date    CHOL 219 (H) 06/06/2018    CHOL 239 (H) 09/20/2017    HDL 55 06/06/2018    HDL 56 09/20/2017    LDLCALC 140.8 06/06/2018    LDLCALC 161.6 (H) 09/20/2017    TRIG 116 06/06/2018    TRIG 107 09/20/2017    CHOLHDL 25.1 06/06/2018    CHOLHDL 23.4 09/20/2017       CBC - LAST 2  Lab Results   Component Value Date    WBC 10.98 09/06/2018    WBC 13.99 (H) 06/06/2018    RBC 4.88 09/06/2018    RBC 4.68 06/06/2018    HGB 14.9 09/06/2018    HGB 14.4 06/06/2018    HCT 45.8 09/06/2018    HCT 43.9 06/06/2018    MCV 94 09/06/2018    MCV 94 06/06/2018    MCH 30.5 09/06/2018    MCH 30.8 06/06/2018    MCHC 32.5 09/06/2018    MCHC 32.8 06/06/2018    RDW 13.1 09/06/2018    RDW 13.2 06/06/2018     09/06/2018     06/06/2018    MPV 12.5 09/06/2018    MPV 12.5 06/06/2018    GRAN 6.0 09/06/2018    GRAN 54.9 09/06/2018    LYMPH 3.2 09/06/2018    LYMPH 29.3 09/06/2018    MONO 0.8 09/06/2018    MONO 7.7 09/06/2018    BASO 0.10 09/06/2018    BASO 0.09 06/06/2018    NRBC 0 09/06/2018    NRBC 0 06/06/2018       CHEMISTRY & LIVER FUNCTION - LAST 2  Lab Results   Component Value Date     06/06/2018     05/16/2018    K 4.1 06/06/2018    K 4.2 05/16/2018     06/06/2018     05/16/2018    CO2 26 06/06/2018    CO2 22 (L) 05/16/2018    ANIONGAP 8 06/06/2018    ANIONGAP 8 05/16/2018    BUN 19 06/06/2018    BUN 17 05/16/2018    CREATININE 0.9 06/06/2018    CREATININE 0.9 05/16/2018    GLU 70 06/06/2018    GLU 74 05/16/2018     CALCIUM 9.8 06/06/2018    CALCIUM 9.3 05/16/2018    ALBUMIN 3.9 06/06/2018    ALBUMIN 3.7 05/16/2018    PROT 6.8 06/06/2018    PROT 6.7 05/16/2018    ALKPHOS 67 06/06/2018    ALKPHOS 64 05/16/2018    ALT 8 (L) 06/06/2018    ALT 9 (L) 05/16/2018    AST 17 06/06/2018    AST 17 05/16/2018    BILITOT 0.4 06/06/2018    BILITOT 0.6 05/16/2018        CARDIAC PROFILE - LAST 2  Lab Results   Component Value Date    BNP 13 09/20/2017    CPK 75 09/20/2017    TROPONINI 0.013 05/16/2018    TROPONINI <0.006 09/20/2017        COAGULATION - LAST 2  Lab Results   Component Value Date    INR 1.0 09/20/2017       ENDOCRINE & PSA - LAST 2  Lab Results   Component Value Date    HGBA1C 5.0 06/06/2018    TSH 1.093 06/06/2018    TSH 1.391 09/20/2017        ECHOCARDIOGRAM RESULTS  No results found for this or any previous visit.      CURRENT/PREVIOUS VISIT EKG    No valid procedures specified.   No results found for this or any previous visit.    No valid procedures specified.        Assessment:       1. Tobacco dependence    2. Severe aortic stenosis           Plan:       Tobacco dependence    Severe aortic stenosis     Patient has a letter from [facility] which states that she I being evaluated for TAVR. Would recommend to continue evaluation of TAVR. If she is not satisfied with there care, she is instructed to call us at which point she would be referred to [facility] for TAVR.     Follow up in about 3 months (around 4/5/2022) for aortic stenosis.          MD Basil Decker [facility]

## 2022-11-15 NOTE — PROGRESS NOTES
CC:  64-year-old female stepped in a hole about 10 days ago [id number] injured the right foot. She has had pain and swelling over the lateral aspect of the foot since that time mostly distally around the small toe.  She was seen in urgent care clinic until she had fracture need follow-up with orthopedist so she presents today for evaluation.    ROS:    Constitution: Denies chills, fever, and sweats.  HENT: Denies headaches or blurry vision.  Cardiovascular: Denies chest pain or irregular heart beat.  Respiratory: Denies cough or shortness of breath.  Gastrointestinal: Denies abdominal pain, nausea, or vomiting.  Genitourinary:  Denies urinary incontinence, bladder and kidney issues  Musculoskeletal:  Denies muscle cramps.  Positive for pain in the right foot  Neurological: Denies dizziness or focal weakness.  Psychiatric/Behavioral: Normal mental status.  Hematologic/Lymphatic: Denies bleeding problem or easy bruising/bleeding.  Skin: Denies rash or suspicious lesions.    Physical examination     Gen - No acute distress   Eyes - Extraoccular motions intact, pupils equally round and reactive to light and accommodation   ENT - normocephalic, atruamtic, oropharynx clear   Neck - Supple, no abnormal masses   Cardiovascular - regular rate and rhythm   Pulmonary - clear to auscultation bilaterally   Abdomen - soft, non-tender, non-distended, positive bowel sounds   Psych - The patient is alert and oriented x3 with normal mood and affect    Right Lower Extremity Examination     Skin is intact throughout   Motor is intact distally EHL, FHL, TA, and gastroc   +2 dorsalis pedis and posterior tibial pulses   Sensation to light touch is intact distally dorsal, plantar, and first web space  Swelling of the small toe noted     Examination of the Right ankle and foot:   Hindfoot alignment in neutral   Good subtalar motion   Anterior drawer - negative  Posterior drawer - negative  Talar tilt - negative    Tenderness to palpation:    ATFL - negative  CFL - negative  Deltoid Ligament - negative  Tibialis anterior - negative  Tibialis posterior - negative  Peroneus longus - negative  Peroneus brevis - negative  Gastroc - negative  Plantar fascia - negative  Subtalar joint - negative  Tibiotalar joint - negative  Midfoot - negative  MTP joints - positive at the small toe  Hallux valgus present - negative  Hammertoes present -negative    X-rays were examined and personally reviewed by me.  Three views the right foot obtained today show a nondisplaced fracture of the proximal phalanx of the small toe of the right foot    Dx:  Nondisplaced fracture proximal phalanx small toe right foot    Plan:  We showed the patient how to katherine tape the toe.  Follow up in 2 weeks with an x-ray.   CIWA protocol initiation less than 96 hours

## 2023-01-04 ENCOUNTER — OFFICE VISIT (OUTPATIENT)
Dept: URGENT CARE | Facility: CLINIC | Age: 68
End: 2023-01-04
Payer: MEDICARE

## 2023-01-04 VITALS
HEIGHT: 62 IN | SYSTOLIC BLOOD PRESSURE: 149 MMHG | RESPIRATION RATE: 24 BRPM | BODY MASS INDEX: 27.79 KG/M2 | TEMPERATURE: 98 F | DIASTOLIC BLOOD PRESSURE: 76 MMHG | WEIGHT: 151 LBS | HEART RATE: 80 BPM | OXYGEN SATURATION: 94 %

## 2023-01-04 DIAGNOSIS — R05.9 COUGH, UNSPECIFIED TYPE: ICD-10-CM

## 2023-01-04 DIAGNOSIS — J40 BRONCHITIS: Primary | ICD-10-CM

## 2023-01-04 DIAGNOSIS — R09.89 RHONCHI: ICD-10-CM

## 2023-01-04 DIAGNOSIS — R06.02 SHORTNESS OF BREATH: ICD-10-CM

## 2023-01-04 DIAGNOSIS — R06.2 WHEEZING: ICD-10-CM

## 2023-01-04 LAB
CTP QC/QA: YES
SARS-COV-2 AG RESP QL IA.RAPID: NEGATIVE

## 2023-01-04 PROCEDURE — 1160F RVW MEDS BY RX/DR IN RCRD: CPT | Mod: CPTII,S$GLB,, | Performed by: NURSE PRACTITIONER

## 2023-01-04 PROCEDURE — 99204 PR OFFICE/OUTPT VISIT, NEW, LEVL IV, 45-59 MIN: ICD-10-PCS | Mod: 25,S$GLB,CS, | Performed by: NURSE PRACTITIONER

## 2023-01-04 PROCEDURE — 1159F MED LIST DOCD IN RCRD: CPT | Mod: CPTII,S$GLB,, | Performed by: NURSE PRACTITIONER

## 2023-01-04 PROCEDURE — 99204 OFFICE O/P NEW MOD 45 MIN: CPT | Mod: 25,S$GLB,CS, | Performed by: NURSE PRACTITIONER

## 2023-01-04 PROCEDURE — 1160F PR REVIEW ALL MEDS BY PRESCRIBER/CLIN PHARMACIST DOCUMENTED: ICD-10-PCS | Mod: CPTII,S$GLB,, | Performed by: NURSE PRACTITIONER

## 2023-01-04 PROCEDURE — 87811 SARS CORONAVIRUS 2 ANTIGEN POCT, MANUAL READ: ICD-10-PCS | Mod: QW,S$GLB,, | Performed by: NURSE PRACTITIONER

## 2023-01-04 PROCEDURE — 3078F PR MOST RECENT DIASTOLIC BLOOD PRESSURE < 80 MM HG: ICD-10-PCS | Mod: CPTII,S$GLB,, | Performed by: NURSE PRACTITIONER

## 2023-01-04 PROCEDURE — 3077F PR MOST RECENT SYSTOLIC BLOOD PRESSURE >= 140 MM HG: ICD-10-PCS | Mod: CPTII,S$GLB,, | Performed by: NURSE PRACTITIONER

## 2023-01-04 PROCEDURE — 3008F PR BODY MASS INDEX (BMI) DOCUMENTED: ICD-10-PCS | Mod: CPTII,S$GLB,, | Performed by: NURSE PRACTITIONER

## 2023-01-04 PROCEDURE — 3078F DIAST BP <80 MM HG: CPT | Mod: CPTII,S$GLB,, | Performed by: NURSE PRACTITIONER

## 2023-01-04 PROCEDURE — 3008F BODY MASS INDEX DOCD: CPT | Mod: CPTII,S$GLB,, | Performed by: NURSE PRACTITIONER

## 2023-01-04 PROCEDURE — 96372 PR INJECTION,THERAP/PROPH/DIAG2ST, IM OR SUBCUT: ICD-10-PCS | Mod: S$GLB,,, | Performed by: NURSE PRACTITIONER

## 2023-01-04 PROCEDURE — 3077F SYST BP >= 140 MM HG: CPT | Mod: CPTII,S$GLB,, | Performed by: NURSE PRACTITIONER

## 2023-01-04 PROCEDURE — 96372 THER/PROPH/DIAG INJ SC/IM: CPT | Mod: S$GLB,,, | Performed by: NURSE PRACTITIONER

## 2023-01-04 PROCEDURE — 87811 SARS-COV-2 COVID19 W/OPTIC: CPT | Mod: QW,S$GLB,, | Performed by: NURSE PRACTITIONER

## 2023-01-04 PROCEDURE — 1159F PR MEDICATION LIST DOCUMENTED IN MEDICAL RECORD: ICD-10-PCS | Mod: CPTII,S$GLB,, | Performed by: NURSE PRACTITIONER

## 2023-01-04 RX ORDER — LEVOFLOXACIN 750 MG/1
750 TABLET ORAL DAILY
Qty: 7 TABLET | Refills: 0 | Status: SHIPPED | OUTPATIENT
Start: 2023-01-04 | End: 2023-01-11

## 2023-01-04 RX ORDER — ALBUTEROL SULFATE 90 UG/1
2 AEROSOL, METERED RESPIRATORY (INHALATION) EVERY 6 HOURS PRN
Qty: 18 G | Refills: 0 | Status: SHIPPED | OUTPATIENT
Start: 2023-01-04 | End: 2023-11-15 | Stop reason: SDUPTHER

## 2023-01-04 RX ORDER — GUAIFENESIN AND DEXTROMETHORPHAN HYDROBROMIDE 1200; 60 MG/1; MG/1
1 TABLET, EXTENDED RELEASE ORAL EVERY 12 HOURS PRN
Qty: 20 TABLET | Refills: 0 | Status: SHIPPED | OUTPATIENT
Start: 2023-01-04 | End: 2023-01-14

## 2023-01-04 RX ORDER — CEFTRIAXONE 1 G/1
1 INJECTION, POWDER, FOR SOLUTION INTRAMUSCULAR; INTRAVENOUS
Status: COMPLETED | OUTPATIENT
Start: 2023-01-04 | End: 2023-01-04

## 2023-01-04 RX ORDER — DEXAMETHASONE SODIUM PHOSPHATE 4 MG/ML
8 INJECTION, SOLUTION INTRA-ARTICULAR; INTRALESIONAL; INTRAMUSCULAR; INTRAVENOUS; SOFT TISSUE ONCE
Status: COMPLETED | OUTPATIENT
Start: 2023-01-04 | End: 2023-01-04

## 2023-01-04 RX ADMIN — CEFTRIAXONE 1 G: 1 INJECTION, POWDER, FOR SOLUTION INTRAMUSCULAR; INTRAVENOUS at 02:01

## 2023-01-04 RX ADMIN — DEXAMETHASONE SODIUM PHOSPHATE 8 MG: 4 INJECTION, SOLUTION INTRA-ARTICULAR; INTRALESIONAL; INTRAMUSCULAR; INTRAVENOUS; SOFT TISSUE at 02:01

## 2023-01-04 NOTE — PROGRESS NOTES
"Subjective:       Patient ID: Elissa Raymond is a 67 y.o. female.    Vitals:  height is 5' 2" (1.575 m) and weight is 68.5 kg (151 lb). Her temperature is 98.1 °F (36.7 °C). Her blood pressure is 149/76 (abnormal) and her pulse is 80. Her respiration is 24 (abnormal) and oxygen saturation is 94% (abnormal).     Chief Complaint: Cough and Shortness of Breath (/)    Elissa Raymond presents to clinic with cough that use a productive sputum, headache, ear pain, wheezing and shortness of breath that has been present for approximately 10 days. Pt states she went to the er when she was in [place] because  she was having trouble breathing. This was on [place] day. She got a breathing treatment, antibiotics (Zpack) and steroids. She felt better but never got over it/never got well from it. Says she got back on the 29th from [place] and her symptoms have been steadily getting worse. The past few days all she can do is lay in bed. She has been getting light headed every time she gets up to walk and it is making her scared shes going to pass out. She has been running a temperature of about 100.5-100.7.     Shortness of Breath  This is a new problem. The current episode started 1 to 4 weeks ago (2 weeks). The problem has been gradually worsening. Associated symptoms include ear pain, headaches, rhinorrhea, sputum production and wheezing. Pertinent negatives include no sore throat. Treatments tried: inhaler. Her past medical history is significant for bronchiolitis and COPD.     Constitution: Negative.   HENT:  Positive for ear pain. Negative for sore throat.    Neck: neck negative.   Cardiovascular: Negative.    Eyes: Negative.    Respiratory:  Positive for sputum production, shortness of breath and wheezing.    Endocrine: negative.   Genitourinary: Negative.    Musculoskeletal: Negative.    Skin: Negative.    Neurological:  Positive for headaches.     Objective:      Physical Exam   Constitutional: She is oriented to " person, place, and time. She appears well-developed. She is cooperative.  Non-toxic appearance. She appears ill. No distress.   HENT:   Head: Normocephalic and atraumatic.   Ears:   Right Ear: Hearing, external ear and ear canal normal. A middle ear effusion is present.   Left Ear: Hearing, external ear and ear canal normal. A middle ear effusion is present.   Nose: Nose normal. No mucosal edema, rhinorrhea or nasal deformity. No epistaxis. Right sinus exhibits no maxillary sinus tenderness and no frontal sinus tenderness. Left sinus exhibits no maxillary sinus tenderness and no frontal sinus tenderness.   Mouth/Throat: Uvula is midline and mucous membranes are normal. No trismus in the jaw. Normal dentition. No uvula swelling. Posterior oropharyngeal erythema present. No oropharyngeal exudate or posterior oropharyngeal edema.   Eyes: Conjunctivae and lids are normal. No scleral icterus.   Neck: Trachea normal and phonation normal. Neck supple. No edema present. No erythema present. No neck rigidity present.   Cardiovascular: Normal rate, regular rhythm, normal heart sounds and normal pulses.   Pulmonary/Chest: Effort normal. No respiratory distress. She has no decreased breath sounds. She has wheezes in the right upper field, the right middle field, the right lower field, the left upper field and the left lower field. She has rhonchi in the right upper field, the right middle field, the right lower field, the left upper field and the left lower field.   Abdominal: Normal appearance.   Musculoskeletal: Normal range of motion.         General: No deformity. Normal range of motion.   Lymphadenopathy:     She has cervical adenopathy.        Right cervical: Superficial cervical adenopathy present.        Left cervical: Superficial cervical adenopathy present.   Neurological: She is alert and oriented to person, place, and time. She exhibits normal muscle tone. Coordination normal.   Skin: Skin is warm, dry, intact, not  diaphoretic and not pale.   Psychiatric: Her speech is normal and behavior is normal. Judgment and thought content normal.   Nursing note and vitals reviewed.      Assessment:       1. Bronchitis    2. Cough, unspecified type    3. Shortness of breath    4. Wheezing    5. Rhonchi          Plan:         Bronchitis  -     cefTRIAXone injection 1 g  -     SARS Coronavirus 2 Antigen, POCT Manual Read  -     dexAMETHasone injection 8 mg  -     levoFLOXacin (LEVAQUIN) 750 MG tablet; Take 1 tablet (750 mg total) by mouth once daily. for 7 days  Dispense: 7 tablet; Refill: 0  -     dextromethorphan-guaiFENesin (MUCINEX DM) 60-1,200 mg per 12 hr tablet; Take 1 tablet by mouth every 12 (twelve) hours as needed.  Dispense: 20 tablet; Refill: 0  -     albuterol (PROAIR HFA) 90 mcg/actuation inhaler; Inhale 2 puffs into the lungs every 6 (six) hours as needed for Wheezing. Rescue  Dispense: 18 g; Refill: 0    Cough, unspecified type  -     XR CHEST PA AND LATERAL; Future; Expected date: 01/04/2023  -     cefTRIAXone injection 1 g  -     SARS Coronavirus 2 Antigen, POCT Manual Read  -     dexAMETHasone injection 8 mg  -     levoFLOXacin (LEVAQUIN) 750 MG tablet; Take 1 tablet (750 mg total) by mouth once daily. for 7 days  Dispense: 7 tablet; Refill: 0  -     dextromethorphan-guaiFENesin (MUCINEX DM) 60-1,200 mg per 12 hr tablet; Take 1 tablet by mouth every 12 (twelve) hours as needed.  Dispense: 20 tablet; Refill: 0    Shortness of breath  -     XR CHEST PA AND LATERAL; Future; Expected date: 01/04/2023  -     cefTRIAXone injection 1 g  -     SARS Coronavirus 2 Antigen, POCT Manual Read  -     dexAMETHasone injection 8 mg  -     levoFLOXacin (LEVAQUIN) 750 MG tablet; Take 1 tablet (750 mg total) by mouth once daily. for 7 days  Dispense: 7 tablet; Refill: 0  -     dextromethorphan-guaiFENesin (MUCINEX DM) 60-1,200 mg per 12 hr tablet; Take 1 tablet by mouth every 12 (twelve) hours as needed.  Dispense: 20 tablet; Refill:  0    Wheezing  -     cefTRIAXone injection 1 g  -     SARS Coronavirus 2 Antigen, POCT Manual Read  -     dexAMETHasone injection 8 mg  -     levoFLOXacin (LEVAQUIN) 750 MG tablet; Take 1 tablet (750 mg total) by mouth once daily. for 7 days  Dispense: 7 tablet; Refill: 0  -     dextromethorphan-guaiFENesin (MUCINEX DM) 60-1,200 mg per 12 hr tablet; Take 1 tablet by mouth every 12 (twelve) hours as needed.  Dispense: 20 tablet; Refill: 0  -     albuterol (PROAIR HFA) 90 mcg/actuation inhaler; Inhale 2 puffs into the lungs every 6 (six) hours as needed for Wheezing. Rescue  Dispense: 18 g; Refill: 0    [place]  -     cefTRIAXone injection 1 g  -     SARS Coronavirus 2 Antigen, POCT Manual Read  -     dexAMETHasone injection 8 mg  -     levoFLOXacin (LEVAQUIN) 750 MG tablet; Take 1 tablet (750 mg total) by mouth once daily. for 7 days  Dispense: 7 tablet; Refill: 0  -     dextromethorphan-guaiFENesin (MUCINEX DM) 60-1,200 mg per 12 hr tablet; Take 1 tablet by mouth every 12 (twelve) hours as needed.  Dispense: 20 tablet; Refill: 0    I will treat patient more aggressively as I am very concerned about her clinical presentation today.  Chest x-ray has ruled out pneumonia at this time, however we will treat with Levaquin.

## 2023-06-26 ENCOUNTER — TELEPHONE (OUTPATIENT)
Dept: CARDIOLOGY | Facility: CLINIC | Age: 68
End: 2023-06-26
Payer: OTHER GOVERNMENT

## 2023-06-26 NOTE — TELEPHONE ENCOUNTER
----- Message from Bernie Brandi sent at 6/26/2023 10:01 AM CDT -----  Contact: pt  Type:  Sooner Appointment Request    Caller is requesting a sooner appointment.  Caller declined first available appointment listed below.  Caller will not accept being placed on the waitlist and is requesting a message be sent to doctor.    Name of Caller:  pt  When is the first available appointment?  None coming up  Symptoms:  check up and talk about her point of care    Best Call Back Number:  [phone]  Additional Information:

## 2023-09-11 ENCOUNTER — LAB VISIT (OUTPATIENT)
Dept: LAB | Facility: HOSPITAL | Age: 68
End: 2023-09-11
Attending: INTERNAL MEDICINE
Payer: OTHER GOVERNMENT

## 2023-09-11 ENCOUNTER — TELEPHONE (OUTPATIENT)
Dept: CARDIOLOGY | Facility: CLINIC | Age: 68
End: 2023-09-11

## 2023-09-11 ENCOUNTER — OFFICE VISIT (OUTPATIENT)
Dept: CARDIOLOGY | Facility: CLINIC | Age: 68
End: 2023-09-11
Payer: OTHER GOVERNMENT

## 2023-09-11 VITALS
HEIGHT: 62 IN | SYSTOLIC BLOOD PRESSURE: 130 MMHG | HEART RATE: 70 BPM | DIASTOLIC BLOOD PRESSURE: 80 MMHG | OXYGEN SATURATION: 95 % | BODY MASS INDEX: 26.33 KG/M2 | WEIGHT: 143.06 LBS

## 2023-09-11 DIAGNOSIS — Z86.79 HISTORY OF AORTIC STENOSIS: ICD-10-CM

## 2023-09-11 DIAGNOSIS — Z95.3 S/P TAVR (TRANSCATHETER AORTIC VALVE REPLACEMENT), BIOPROSTHETIC: Primary | ICD-10-CM

## 2023-09-11 DIAGNOSIS — I77.1 BILATERAL ILIAC ARTERY STENOSIS: ICD-10-CM

## 2023-09-11 DIAGNOSIS — I73.9 PERIPHERAL ARTERIAL DISEASE: ICD-10-CM

## 2023-09-11 DIAGNOSIS — E78.5 DYSLIPIDEMIA: ICD-10-CM

## 2023-09-11 DIAGNOSIS — Z72.0 TOBACCO ABUSE: ICD-10-CM

## 2023-09-11 LAB — LACTATE SERPL-SCNC: 1 MMOL/L (ref 0.5–1.9)

## 2023-09-11 PROCEDURE — 93010 ELECTROCARDIOGRAM REPORT: CPT | Mod: S$PBB,,, | Performed by: GENERAL PRACTICE

## 2023-09-11 PROCEDURE — 93010 EKG 12-LEAD: ICD-10-PCS | Mod: S$PBB,,, | Performed by: GENERAL PRACTICE

## 2023-09-11 PROCEDURE — 83605 ASSAY OF LACTIC ACID: CPT | Performed by: INTERNAL MEDICINE

## 2023-09-11 PROCEDURE — 99215 OFFICE O/P EST HI 40 MIN: CPT | Mod: PBBFAC,PN | Performed by: INTERNAL MEDICINE

## 2023-09-11 PROCEDURE — 99214 PR OFFICE/OUTPT VISIT, EST, LEVL IV, 30-39 MIN: ICD-10-PCS | Mod: S$PBB,,, | Performed by: INTERNAL MEDICINE

## 2023-09-11 PROCEDURE — 99999 PR PBB SHADOW E&M-EST. PATIENT-LVL V: ICD-10-PCS | Mod: PBBFAC,,, | Performed by: INTERNAL MEDICINE

## 2023-09-11 PROCEDURE — 99999 PR PBB SHADOW E&M-EST. PATIENT-LVL V: CPT | Mod: PBBFAC,,, | Performed by: INTERNAL MEDICINE

## 2023-09-11 PROCEDURE — 36415 COLL VENOUS BLD VENIPUNCTURE: CPT | Performed by: INTERNAL MEDICINE

## 2023-09-11 PROCEDURE — 93005 ELECTROCARDIOGRAM TRACING: CPT | Mod: PBBFAC,PN | Performed by: GENERAL PRACTICE

## 2023-09-11 PROCEDURE — 99214 OFFICE O/P EST MOD 30 MIN: CPT | Mod: S$PBB,,, | Performed by: INTERNAL MEDICINE

## 2023-09-11 RX ORDER — AZITHROMYCIN 500 MG/1
500 TABLET, FILM COATED ORAL DAILY
Qty: 1 TABLET | Refills: 1 | Status: SHIPPED | OUTPATIENT
Start: 2023-09-11 | End: 2023-11-15

## 2023-09-11 RX ORDER — ASPIRIN 81 MG/1
81 TABLET ORAL DAILY
Qty: 360 TABLET | Refills: 0 | Status: SHIPPED | OUTPATIENT
Start: 2023-09-11 | End: 2023-11-15

## 2023-09-11 RX ORDER — CILOSTAZOL 50 MG/1
50 TABLET ORAL 2 TIMES DAILY
Qty: 180 TABLET | Refills: 3 | Status: SHIPPED | OUTPATIENT
Start: 2023-09-11 | End: 2023-11-15 | Stop reason: SDUPTHER

## 2023-09-11 RX ORDER — ATORVASTATIN CALCIUM 10 MG/1
10 TABLET, FILM COATED ORAL DAILY
Qty: 90 TABLET | Refills: 3 | Status: SHIPPED | OUTPATIENT
Start: 2023-09-11 | End: 2023-11-15

## 2023-09-11 NOTE — TELEPHONE ENCOUNTER
----- Message from Kay Hicks sent at 9/11/2023 12:26 PM CDT -----  Type: Need Medical Advice   Who Called: Radha archuleta law of patient  Best callback number:   Additional Information: Patient is waiting for lab results, per Dr if the labs are abnormal the patient will be admitted, she ASKED that someone check and call her back before she leave for [place]  Please call to further assist, Thanks.

## 2023-09-11 NOTE — PROGRESS NOTES
[facility]    Subjective:     Patient ID:  Elissa Raymond is a 68 y.o. female patient here for evaluation Follow-up (6 months )      HPI:  68-year-old female here for follow-up.  Has history of nonobstructive coronary artery disease and TAVR.  Also has severe PAD as evidenced by symptoms and by CT scan done at [facility] last year.  Reports pain in legs sometimes at rest and worse with walking along with discoloration changes.  Reports symptoms are worse for the last 2 months.  Continues to smoke.  Does not take aspirin.  Not taking statin either.  Reports easy bruising as the reason.    Review of Systems   All other systems reviewed and are negative.       Past Medical History:   Diagnosis Date    Hyperlipidemia     Hypertension     Stroke     x 3    Tobacco use 11/2/2017       Past Surgical History:   Procedure Laterality Date    BLADDER SURGERY      CATARACT EXTRACTION      CHOLECYSTECTOMY      HYSTERECTOMY      KNEE SURGERY Left     TONSILLECTOMY         Family History   Problem Relation Age of Onset    Diabetes Mother     Kidney disease Mother     Glaucoma Mother     Macular degeneration Mother     Cancer Father     Glaucoma Sister     Macular degeneration Sister     Macular degeneration Brother        Social History     Socioeconomic History    Marital status:    Tobacco Use    Smoking status: Every Day     Current packs/day: 1.00     Average packs/day: 1 pack/day for 46.0 years (46.0 ttl pk-yrs)     Types: Cigarettes    Smokeless tobacco: Never   Substance and Sexual Activity    Alcohol use: Yes     Comment: once a month or less    Drug use: No    Sexual activity: Yes     Partners: Male     Birth control/protection: None   Social History Narrative    6/6/18: lives with Arpit, also a patient of mine. There are two dogs at home. She is the only smoker at home. She has two kids, they are is [place].        Current Outpatient  Medications   Medication Sig Dispense Refill    albuterol (PROAIR HFA) 90 mcg/actuation inhaler Inhale 2 puffs into the lungs every 6 (six) hours as needed for Wheezing. Rescue 18 g 0    albuterol-ipratropium (DUO-NEB) 2.5 mg-0.5 mg/3 mL nebulizer solution Take 3 mLs by nebulization every 6 (six) hours as needed for Wheezing. Rescue      cholecalciferol, vitamin D3, 1,250 mcg (50,000 unit) capsule Take 50,000 Units by mouth.      ergocalciferol (ERGOCALCIFEROL) 50,000 unit Cap Take 1 capsule (50,000 Units total) by mouth every 7 days. 12 capsule 0    aspirin (ECOTRIN) 81 MG EC tablet Take 1 tablet (81 mg total) by mouth once daily. 360 tablet 0    atorvastatin (LIPITOR) 10 MG tablet Take 1 tablet (10 mg total) by mouth once daily. 90 tablet 3    azithromycin (ZITHROMAX) 500 MG tablet Take 1 tablet (500 mg total) by mouth once daily. For endocarditis prophylaxis,take single dose 30 to 60 mins before dental procedure 1 tablet 1    benzonatate (TESSALON) 200 MG capsule Take 200 mg by mouth 3 (three) times daily as needed for Cough.      cilostazoL (PLETAL) 50 MG Tab Take 1 tablet (50 mg total) by mouth 2 (two) times daily. 180 tablet 3    hydroCHLOROthiazide (HYDRODIURIL) 25 MG tablet Take 25 mg by mouth.      metoprolol succinate (TOPROL-XL) 25 MG 24 hr tablet Take 25 mg by mouth.      OXYGEN-AIR DELIVERY SYSTEMS MISC by [facility].(Non-Drug; Combo Route) route. 3 L by nasal route       No current facility-administered medications for this visit.       Review of patient's allergies indicates:   Allergen Reactions    Pcn [penicillins] Other (See Comments)     Unable to obtain         Objective:        Vitals:    09/11/23 1016   BP: 130/80   Pulse: 70       Physical Exam  Vitals reviewed.   Constitutional:       Appearance: Normal appearance.   HENT:      Mouth/Throat:      Mouth: Mucous membranes are moist.   Eyes:      Extraocular Movements: Extraocular movements intact.      Pupils: Pupils are equal, round, and reactive to  light.   Cardiovascular:      Rate and Rhythm: Normal rate and regular rhythm.      Pulses: Normal pulses.      Heart sounds: Normal heart sounds. No murmur heard.     No gallop.   Pulmonary:      Effort: Pulmonary effort is normal.      Breath sounds: Normal breath sounds.   Abdominal:      General: Bowel sounds are normal.      Palpations: Abdomen is soft.   Musculoskeletal:         General: Normal range of motion.   Skin:     General: Skin is warm and dry.   Neurological:      General: No focal deficit present.      Mental Status: She is alert and oriented to person, place, and time.   Psychiatric:         Mood and Affect: Mood normal.         LIPIDS - LAST 2   Lab Results   Component Value Date    CHOL 219 (H) 06/06/2018    CHOL 239 (H) 09/20/2017    HDL 55 06/06/2018    HDL 56 09/20/2017    LDLCALC 140.8 06/06/2018    LDLCALC 161.6 (H) 09/20/2017    TRIG 116 06/06/2018    TRIG 107 09/20/2017    CHOLHDL 25.1 06/06/2018    CHOLHDL 23.4 09/20/2017       CBC - LAST 2  Lab Results   Component Value Date    WBC 10.98 09/06/2018    WBC 13.99 (H) 06/06/2018    RBC 4.88 09/06/2018    RBC 4.68 06/06/2018    HGB 14.9 09/06/2018    HGB 14.4 06/06/2018    HCT 45.8 09/06/2018    HCT 43.9 06/06/2018    MCV 94 09/06/2018    MCV 94 06/06/2018    MCH 30.5 09/06/2018    MCH 30.8 06/06/2018    MCHC 32.5 09/06/2018    MCHC 32.8 06/06/2018    RDW 13.1 09/06/2018    RDW 13.2 06/06/2018     09/06/2018     06/06/2018    MPV 12.5 09/06/2018    MPV 12.5 06/06/2018    GRAN 6.0 09/06/2018    GRAN 54.9 09/06/2018    LYMPH 3.2 09/06/2018    LYMPH 29.3 09/06/2018    MONO 0.8 09/06/2018    MONO 7.7 09/06/2018    BASO 0.10 09/06/2018    BASO 0.09 06/06/2018    NRBC 0 09/06/2018    NRBC 0 06/06/2018       CHEMISTRY & LIVER FUNCTION - LAST 2  Lab Results   Component Value Date     06/06/2018     05/16/2018    K 4.1 06/06/2018    K 4.2 05/16/2018     06/06/2018     05/16/2018    CO2 26 06/06/2018    CO2 22 (L)  05/16/2018    ANIONGAP 8 06/06/2018    ANIONGAP 8 05/16/2018    BUN 19 06/06/2018    BUN 17 05/16/2018    CREATININE 0.9 06/06/2018    CREATININE 0.9 05/16/2018    GLU 70 06/06/2018    GLU 74 05/16/2018    CALCIUM 9.8 06/06/2018    CALCIUM 9.3 05/16/2018    ALBUMIN 3.9 06/06/2018    ALBUMIN 3.7 05/16/2018    PROT 6.8 06/06/2018    PROT 6.7 05/16/2018    ALKPHOS 67 06/06/2018    ALKPHOS 64 05/16/2018    ALT 8 (L) 06/06/2018    ALT 9 (L) 05/16/2018    AST 17 06/06/2018    AST 17 05/16/2018    BILITOT 0.4 06/06/2018    BILITOT 0.6 05/16/2018        CARDIAC PROFILE - LAST 2  Lab Results   Component Value Date    BNP 13 09/20/2017    CPK 75 09/20/2017    TROPONINI 0.013 05/16/2018    TROPONINI <0.006 09/20/2017        COAGULATION - LAST 2  Lab Results   Component Value Date    INR 1.0 09/20/2017       ENDOCRINE & PSA - LAST 2  Lab Results   Component Value Date    HGBA1C 5.9 08/24/2021    HGBA1C 5.0 06/06/2018    TSH 1.093 06/06/2018    TSH 1.391 09/20/2017        ECHOCARDIOGRAM RESULTS  No results found for this or any previous visit.      CURRENT/PREVIOUS VISIT EKG    No valid procedures specified.   No results found for this or any previous visit.    No valid procedures specified.        Assessment:       1. S/p TAVR (transcatheter aortic valve replacement), bioprosthetic    2. Peripheral arterial disease    3. Bilateral iliac artery stenosis    4. History of aortic stenosis    5. Dyslipidemia    6. Tobacco abuse           Plan:       S/p TAVR (transcatheter aortic valve replacement), bioprosthetic  -     IN OFFICE EKG 12-LEAD (to Muse)  -     azithromycin (ZITHROMAX) 500 MG tablet; Take 1 tablet (500 mg total) by mouth once daily. For endocarditis prophylaxis,take single dose 30 to 60 mins before dental procedure  Dispense: 1 tablet; Refill: 1    Peripheral arterial disease  -     Ambulatory referral/consult to Vascular Surgery; Future; Expected date: 09/18/2023  -     LACTIC ACID, PLASMA; Future; Expected date:  09/11/2023  -     cilostazoL (PLETAL) 50 MG Tab; Take 1 tablet (50 mg total) by mouth 2 (two) times daily.  Dispense: 180 tablet; Refill: 3    Bilateral iliac artery stenosis  -     Ambulatory referral/consult to Vascular Surgery; Future; Expected date: 09/18/2023    History of aortic stenosis    Dyslipidemia  -     atorvastatin (LIPITOR) 10 MG tablet; Take 1 tablet (10 mg total) by mouth once daily.  Dispense: 90 tablet; Refill: 3    Tobacco abuse  -     Ambulatory referral/consult to Smoking Cessation Program; Future; Expected date: 09/18/2023    Other orders  -     aspirin (ECOTRIN) 81 MG EC tablet; Take 1 tablet (81 mg total) by mouth once daily.  Dispense: 360 tablet; Refill: 0    Patient offered going to the ER and getting admitted but patient would like to get evaluated as an outpatient.  Will check a lactic acid.  Refer to vascular surgery urgently for further evaluation.  Start the patient on cilostazol.  Continue with aspirin and statin.    Prophylaxis for bioprosthetic aortic valve provided for any dental procedures.    Patient advised tobacco cessation.    Follow up in about 8 weeks (around 11/6/2023) for f/u NP for PAD and TAVR.          [facility] [place]  [place]

## 2023-09-16 ENCOUNTER — HOSPITAL ENCOUNTER (EMERGENCY)
Facility: HOSPITAL | Age: 68
Discharge: HOME | End: 2023-09-16
Attending: EMERGENCY MEDICINE
Payer: MEDICARE

## 2023-09-16 VITALS
RESPIRATION RATE: 20 BRPM | SYSTOLIC BLOOD PRESSURE: 167 MMHG | OXYGEN SATURATION: 98 % | HEIGHT: 62 IN | HEART RATE: 71 BPM | TEMPERATURE: 98 F | BODY MASS INDEX: 26.31 KG/M2 | WEIGHT: 143 LBS | DIASTOLIC BLOOD PRESSURE: 70 MMHG

## 2023-09-16 DIAGNOSIS — M79.606 LEG PAIN: ICD-10-CM

## 2023-09-16 DIAGNOSIS — I73.9 PVD (PERIPHERAL VASCULAR DISEASE): Primary | ICD-10-CM

## 2023-09-16 PROCEDURE — 99284 EMERGENCY DEPT VISIT MOD MDM: CPT | Mod: 25

## 2023-09-16 RX ORDER — HYDROCODONE BITARTRATE AND ACETAMINOPHEN 5; 325 MG/1; MG/1
1 TABLET ORAL EVERY 6 HOURS PRN
Qty: 12 TABLET | Refills: 0 | Status: SHIPPED | OUTPATIENT
Start: 2023-09-16 | End: 2023-11-15

## 2023-09-16 NOTE — ED PROVIDER NOTES
Encounter Date: 9/16/2023       History     Chief Complaint   Patient presents with    Leg Pain     X 3 YRS, BILAT . GETTING WORSE OVER LAST FEW MONTHS. STATES SHE HAS POOR CIRCULATION     Patient presents complaining of chronic lower leg pain that has been ongoing for 3 years.  Patient has history of peripheral vascular disease.  Patient complains of persistent pain and discomfort especially with walking.  Patient sees Dr. Islas who advised patient she could have an ER evaluation.      Review of patient's allergies indicates:   Allergen Reactions    Pcn [penicillins] Other (See Comments)     Unable to obtain     Past Medical History:   Diagnosis Date    Hyperlipidemia     Hypertension     Stroke     x 3    Tobacco use 11/2/2017     Past Surgical History:   Procedure Laterality Date    BLADDER SURGERY      CATARACT EXTRACTION      CHOLECYSTECTOMY      HYSTERECTOMY      KNEE SURGERY Left     TONSILLECTOMY       Family History   Problem Relation Age of Onset    Diabetes Mother     Kidney disease Mother     Glaucoma Mother     Macular degeneration Mother     Cancer Father     Glaucoma Sister     Macular degeneration Sister     Macular degeneration Brother      Social History     Tobacco Use    Smoking status: Every Day     Current packs/day: 1.00     Average packs/day: 1 pack/day for 46.0 years (46.0 ttl pk-yrs)     Types: Cigarettes    Smokeless tobacco: Never   Substance Use Topics    Alcohol use: Yes     Comment: once a month or less    Drug use: No     Review of Systems   All other systems reviewed and are negative.      Physical Exam     Initial Vitals [09/16/23 1313]   BP Pulse Resp Temp SpO2   (!) 194/103 72 20 98.4 °F (36.9 °C) 95 %      MAP       --         Physical Exam    Nursing note and vitals reviewed.  Constitutional: She appears well-developed and well-nourished.   Pleasant, polite   HENT:   Head: Normocephalic and atraumatic.   Eyes: EOM are normal.   Neck: Neck supple.   Normal range of  motion.  Cardiovascular:            There is a good dorsalis pedis pulse on the right and a good pulse on the left by ultrasound.   Pulmonary/Chest: No respiratory distress.   Musculoskeletal:      Cervical back: Normal range of motion and neck supple.     Neurological: She is alert and oriented to person, place, and time.   Skin: Skin is warm and dry. Capillary refill takes less than 2 seconds.   Psychiatric: She has a normal mood and affect. Her behavior is normal. Judgment and thought content normal.         ED Course   Procedures  Labs Reviewed - No data to display       Imaging Results              US Lower Extremity Veins Bilateral (Final result)  Result time 09/16/23 16:07:51      Final result by Cait Ba DO (09/16/23 16:07:51)                   Narrative:    Bilateral lower extremity venous Doppler ultrasound: 9/16/2023 4:07 PM CDT    TECHNIQUE: Multiple grayscale and spectral Doppler images of the bilateral lower extremity veins were obtained.    HISTORY:  68 years  old Female with lower extremity pain.    FINDINGS: Normal compressibility and color Doppler images of the bilateral common femoral, femoral, popliteal veins were obtained. Normal color Doppler imaging of the calf veins were obtained. There are no findings to suggest deep vein thrombosis.    The visualized soft tissues appear grossly unremarkable.    IMPRESSION: There are no findings to suggest deep vein thrombosis within the bilateral lower extremities.    Electronically signed by:  Cait Ba DO  9/16/2023 4:07 PM CDT Workstation: [id number]                                     Medications - No data to display  Medical Decision Making  Patient has known peripheral vascular disease with pulses in both extremities.  Ultrasound of vein shows no DVT.  Patient was advised she does need follow up for peripheral vascular disease and was advised to contact Dr. Hair.  A referral has already been placed by Dr. Islas.    Risk  Prescription  drug management.                               Clinical Impression:   Final diagnoses:  [M79.606] Leg pain  [I73.9] PVD (peripheral vascular disease) (Primary)        ED Disposition Condition    Discharge Stable          ED Prescriptions       Medication Sig Dispense Start Date End Date Auth. Provider    HYDROcodone-acetaminophen (NORCO) 5-325 mg per tablet Take 1 tablet by mouth every 6 (six) hours as needed for Pain. 12 tablet 9/16/2023 -- Dashawn Lowe MD          Follow-up Information       Follow up With Specialties Details Why Contact Info    Martin Hair MD Vascular Surgery, Cardiology Schedule an appointment as soon as possible for a visit in 2 days  [address] [address]  [phone]               Dashawn Lowe MD  09/16/23 3913

## 2023-09-22 ENCOUNTER — TELEPHONE (OUTPATIENT)
Dept: CARDIOLOGY | Facility: CLINIC | Age: 68
End: 2023-09-22
Payer: OTHER GOVERNMENT

## 2023-09-22 NOTE — TELEPHONE ENCOUNTER
----- Message from Diana Gregorio sent at 9/22/2023  9:30 AM CDT -----  Type: Needs Medical Advice  Who Called:  Afsaneh from Dr. Hair office  Symptoms (please be specific):  said pt been calling to make an appt for 2 weeks now and the office have not sent the referral over to them yet--said she need office notes, referral and testing faxed to her please fax to [phone]  Best Call Back Number: [phone]  Additional Information: thank you

## 2023-09-27 ENCOUNTER — TELEPHONE (OUTPATIENT)
Dept: CARDIOLOGY | Facility: CLINIC | Age: 68
End: 2023-09-27

## 2023-09-27 NOTE — TELEPHONE ENCOUNTER
Hello     This patient called she is having a lot of pain in her legs . Is  there any chance you can see her sooner  ? Thank you for your time.

## 2023-09-27 NOTE — TELEPHONE ENCOUNTER
----- Message from Al Johnson sent at 9/27/2023  3:07 PM CDT -----  Type:  Patient Returning Call    Who Called:pt  Who Left Message for Patient:  Does the patient know what this is regarding?:  Would the patient rather a call back or a response via [facility]? Call  Best Call Back Number:[phone]  Additional Information: pt states she would like to speak with someone in office in regards to instructions that were given as well as the outcome.

## 2023-10-30 ENCOUNTER — HOSPITAL ENCOUNTER (OUTPATIENT)
Dept: RADIOLOGY | Facility: HOSPITAL | Age: 68
Discharge: HOME | End: 2023-10-30
Attending: SURGERY
Payer: MEDICARE

## 2023-10-30 ENCOUNTER — HOSPITAL ENCOUNTER (OUTPATIENT)
Dept: PREADMISSION TESTING | Facility: HOSPITAL | Age: 68
Discharge: HOME | End: 2023-10-30
Attending: SURGERY
Payer: MEDICARE

## 2023-10-30 DIAGNOSIS — I73.9 CLAUDICATION: ICD-10-CM

## 2023-10-30 DIAGNOSIS — I73.9 CLAUDICATION: Primary | ICD-10-CM

## 2023-10-30 DIAGNOSIS — Z01.810 PREOP CARDIOVASCULAR EXAM: Primary | ICD-10-CM

## 2023-10-30 LAB
ANION GAP SERPL CALC-SCNC: 5 MMOL/L (ref 8–16)
APTT PPP: 27.9 SEC (ref 21–32)
BUN SERPL-MCNC: 14 MG/DL (ref 8–23)
CALCIUM SERPL-MCNC: 9.4 MG/DL (ref 8.7–10.5)
CHLORIDE SERPL-SCNC: 107 MMOL/L (ref 95–110)
CO2 SERPL-SCNC: 27 MMOL/L (ref 23–29)
CREAT SERPL-MCNC: 1.1 MG/DL (ref 0.5–1.4)
ERYTHROCYTE [DISTWIDTH] IN BLOOD BY AUTOMATED COUNT: 13.7 % (ref 11.5–14.5)
EST. GFR  (NO RACE VARIABLE): 54.4 ML/MIN/1.73 M^2
GLUCOSE SERPL-MCNC: 77 MG/DL (ref 70–110)
HCT VFR BLD AUTO: 44.7 % (ref 37–48.5)
HGB BLD-MCNC: 14.9 G/DL (ref 12–16)
INR PPP: 0.9 (ref 0.8–1.2)
MCH RBC QN AUTO: 30.5 PG (ref 27–31)
MCHC RBC AUTO-ENTMCNC: 33.3 G/DL (ref 32–36)
MCV RBC AUTO: 92 FL (ref 82–98)
PLATELET # BLD AUTO: 149 K/UL (ref 150–450)
PMV BLD AUTO: 11.5 FL (ref 9.2–12.9)
POTASSIUM SERPL-SCNC: 3.8 MMOL/L (ref 3.5–5.1)
PROTHROMBIN TIME: 10.6 SEC (ref 9–12.5)
RBC # BLD AUTO: 4.88 M/UL (ref 4–5.4)
SODIUM SERPL-SCNC: 139 MMOL/L (ref 136–145)
WBC # BLD AUTO: 8.84 K/UL (ref 3.9–12.7)

## 2023-10-30 PROCEDURE — 71046 X-RAY EXAM CHEST 2 VIEWS: CPT | Mod: TC

## 2023-10-30 PROCEDURE — 93010 ELECTROCARDIOGRAM REPORT: CPT | Mod: ,,, | Performed by: GENERAL PRACTICE

## 2023-10-30 PROCEDURE — 85730 THROMBOPLASTIN TIME PARTIAL: CPT | Performed by: SURGERY

## 2023-10-30 PROCEDURE — 85027 COMPLETE CBC AUTOMATED: CPT | Performed by: SURGERY

## 2023-10-30 PROCEDURE — 85610 PROTHROMBIN TIME: CPT | Performed by: SURGERY

## 2023-10-30 PROCEDURE — 80048 BASIC METABOLIC PNL TOTAL CA: CPT | Performed by: SURGERY

## 2023-10-30 PROCEDURE — 93010 EKG 12-LEAD: ICD-10-PCS | Mod: ,,, | Performed by: GENERAL PRACTICE

## 2023-10-30 PROCEDURE — 93005 ELECTROCARDIOGRAM TRACING: CPT | Performed by: GENERAL PRACTICE

## 2023-10-30 RX ORDER — SODIUM CHLORIDE 9 MG/ML
INJECTION, SOLUTION INTRAVENOUS CONTINUOUS
Status: CANCELLED | OUTPATIENT
Start: 2023-10-30

## 2023-10-30 RX ORDER — BUDESONIDE AND FORMOTEROL FUMARATE DIHYDRATE 160; 4.5 UG/1; UG/1
2 AEROSOL RESPIRATORY (INHALATION) 2 TIMES DAILY
COMMUNITY
Start: 2023-03-30

## 2023-10-30 RX ORDER — ASCORBIC ACID 250 MG
1 TABLET ORAL DAILY
COMMUNITY

## 2023-10-30 RX ORDER — PHENYLEPHRINE HCL 10 MG
500 TABLET ORAL DAILY
COMMUNITY

## 2023-10-30 RX ORDER — DIPHENHYDRAMINE HCL 50 MG
50 CAPSULE ORAL ONCE
Status: CANCELLED | OUTPATIENT
Start: 2023-10-30

## 2023-10-30 NOTE — PLAN OF CARE
Dr. Hair notified of patient allergy to iodinated contrast media. 50mg Benadryl PO ordered pre-procedure per MD.

## 2023-11-01 ENCOUNTER — HOSPITAL ENCOUNTER (OUTPATIENT)
Facility: HOSPITAL | Age: 68
Discharge: HOME | End: 2023-11-01
Attending: SURGERY | Admitting: SURGERY
Payer: MEDICARE

## 2023-11-01 DIAGNOSIS — I73.9 CLAUDICATION: Primary | ICD-10-CM

## 2023-11-01 DIAGNOSIS — Z01.810 PREOP CARDIOVASCULAR EXAM: ICD-10-CM

## 2023-11-01 PROCEDURE — C1894 INTRO/SHEATH, NON-LASER: HCPCS | Performed by: SURGERY

## 2023-11-01 PROCEDURE — 36246 INS CATH ABD/L-EXT ART 2ND: CPT | Mod: LT | Performed by: SURGERY

## 2023-11-01 PROCEDURE — 25000003 PHARM REV CODE 250

## 2023-11-01 PROCEDURE — 99152 MOD SED SAME PHYS/QHP 5/>YRS: CPT | Performed by: SURGERY

## 2023-11-01 PROCEDURE — C1769 GUIDE WIRE: HCPCS | Performed by: SURGERY

## 2023-11-01 PROCEDURE — 27201423 OPTIME MED/SURG SUP & DEVICES STERILE SUPPLY: Performed by: SURGERY

## 2023-11-01 PROCEDURE — 63600175 PHARM REV CODE 636 W HCPCS: Performed by: SURGERY

## 2023-11-01 PROCEDURE — 75625 CONTRAST EXAM ABDOMINL AORTA: CPT | Performed by: SURGERY

## 2023-11-01 PROCEDURE — 25500020 PHARM REV CODE 255: Performed by: SURGERY

## 2023-11-01 PROCEDURE — 25000003 PHARM REV CODE 250: Performed by: SURGERY

## 2023-11-01 PROCEDURE — 75716 ARTERY X-RAYS ARMS/LEGS: CPT | Performed by: SURGERY

## 2023-11-01 RX ORDER — ONDANSETRON 4 MG/1
8 TABLET, ORALLY DISINTEGRATING ORAL EVERY 8 HOURS PRN
Status: DISCONTINUED | OUTPATIENT
Start: 2023-11-01 | End: 2023-11-01 | Stop reason: HOSPADM

## 2023-11-01 RX ORDER — SODIUM CHLORIDE 9 MG/ML
INJECTION, SOLUTION INTRAVENOUS CONTINUOUS
Status: DISCONTINUED | OUTPATIENT
Start: 2023-11-01 | End: 2023-11-01 | Stop reason: SDUPTHER

## 2023-11-01 RX ORDER — IODIXANOL 320 MG/ML
INJECTION, SOLUTION INTRAVASCULAR
Status: DISCONTINUED | OUTPATIENT
Start: 2023-11-01 | End: 2023-11-01 | Stop reason: HOSPADM

## 2023-11-01 RX ORDER — SODIUM CHLORIDE 9 MG/ML
INJECTION, SOLUTION INTRAVENOUS CONTINUOUS
Status: DISCONTINUED | OUTPATIENT
Start: 2023-11-01 | End: 2023-11-01 | Stop reason: HOSPADM

## 2023-11-01 RX ORDER — FENTANYL CITRATE 50 UG/ML
INJECTION, SOLUTION INTRAMUSCULAR; INTRAVENOUS
Status: DISCONTINUED | OUTPATIENT
Start: 2023-11-01 | End: 2023-11-01 | Stop reason: HOSPADM

## 2023-11-01 RX ORDER — DIPHENHYDRAMINE HCL 25 MG
CAPSULE ORAL
Status: COMPLETED
Start: 2023-11-01 | End: 2023-11-01

## 2023-11-01 RX ORDER — ACETAMINOPHEN 325 MG/1
650 TABLET ORAL EVERY 4 HOURS PRN
Status: DISCONTINUED | OUTPATIENT
Start: 2023-11-01 | End: 2023-11-01 | Stop reason: HOSPADM

## 2023-11-01 RX ORDER — DIPHENHYDRAMINE HCL 25 MG
50 CAPSULE ORAL ONCE
Status: COMPLETED | OUTPATIENT
Start: 2023-11-01 | End: 2023-11-01

## 2023-11-01 RX ORDER — MIDAZOLAM HYDROCHLORIDE 1 MG/ML
INJECTION INTRAMUSCULAR; INTRAVENOUS
Status: DISCONTINUED | OUTPATIENT
Start: 2023-11-01 | End: 2023-11-01 | Stop reason: HOSPADM

## 2023-11-01 RX ORDER — LIDOCAINE HYDROCHLORIDE 10 MG/ML
INJECTION INFILTRATION; PERINEURAL
Status: DISCONTINUED | OUTPATIENT
Start: 2023-11-01 | End: 2023-11-01 | Stop reason: HOSPADM

## 2023-11-01 RX ADMIN — Medication 50 MG: at 07:11

## 2023-11-01 RX ADMIN — DIPHENHYDRAMINE HYDROCHLORIDE 50 MG: 25 CAPSULE ORAL at 07:11

## 2023-11-01 RX ADMIN — SODIUM CHLORIDE: 0.9 INJECTION, SOLUTION INTRAVENOUS at 07:11

## 2023-11-01 NOTE — Clinical Note
An angiography was performed of the ostial left posterior tibial artery via hand injection with 10 mL of contrast Contrast estimated

## 2023-11-01 NOTE — H&P
Martin Hair MD - 10/19/2023 2:45 PM CDT  Formatting of this note is different from the original.  VASCULAR SURGERY CLINIC NOTE        Referring Provider:     No ref. provider found     Chief Complaint:     No chief complaint on file.      HPI:     Elissa Raymond is a 68 y.o. female with a history of TAVR through a right carotid approach about a year and a half ago. At that time she also had a CTA of the abdomen and pelvis, reviewed by me, showed bilateral common iliac severe stenosis. She currently is complaining of short distance claudication and hip and thigh pain. On no anticoagulation currently. Hx of cva in the past no lateralizing symptoms.    Medication List:     Current Outpatient Medications:    albuterol (PROVENTIL HFA;VENTOLIN HFA) 90 mcg/actuation inhaler, Inhale 2 puffs into the lungs every 6 (six) hours as needed, Disp: , Rfl:    budesonide-formoteroL (SYMBICORT) 160-4.5 mcg/actuation inhaler, Inhale 2 puffs into the lungs 2 (two) times daily, Disp: , Rfl:    ascorbic acid, vitamin C, (VITAMIN C) 250 MG tablet, Take 1 tablet by mouth daily, Disp: , Rfl:    cholecalciferol, vitamin D3, 1,250 mcg (50,000 unit) capsule, Take 1 capsule by mouth daily, Disp: , Rfl:    ipratropium-albuteroL (DUO-NEB) 0.5 mg-3 mg(2.5 mg base)/3 mL nebulizer solution, Inhale 3 mLs into the lungs every 6 (six) hours as needed, Disp: , Rfl:     Past Medical History:     Past Medical History:   Diagnosis Date    Essential (primary) hypertension    Mixed hyperlipidemia    Stroke (CMS/HCC)    Tobacco use     Past Surgical History:     Past Surgical History:   Procedure Laterality Date    BLADDER SURGERY    CATARACT EXTRACTION, BILATERAL    CHOLECYSTECTOMY    CORONARY ANGIOPLASTY WITH STENT PLACEMENT    HYSTERECTOMY    KNEE SURGERY    TONSILLECTOMY     ROS:     Review of Systems   Constitutional: Negative for chills and fever.   Eyes: Positive for visual disturbance.   Respiratory: Negative for shortness of breath.  "  Cardiovascular: Negative for chest pain.   Gastrointestinal: Negative for abdominal pain.   Neg for post prandial pain or weight loss   Musculoskeletal: Negative for back pain.   Bilateral leriche symptoms   Neurological: Negative for speech difficulty.   Neg for amaurosis or TIA       Allergies:     Allergies   Allergen Reactions    Iodinated Contrast Media Shortness Of Breath    Penicillins Other (See Comments) and Rash   Unable to obtain  Unable to obtain      Social History:     Social History     Socioeconomic History    Marital status:    Spouse name: Not on file    Number of children: Not on file    Years of education: Not on file    Highest education level: Not on file   Occupational History    Not on file   Tobacco Use    Smoking status: Every Day   Types: Cigarettes    Smokeless tobacco: Never   Substance and Sexual Activity    Alcohol use: Yes    Drug use: Not on file    Sexual activity: Not on file   Other Topics Concern    Not on file   Social History Narrative    Not on file     Social Determinants of Health     Financial Resource Strain: Not on file   Food Insecurity: Not on file   Transportation Needs: Not on file   Physical Activity: Not on file   Stress: Not on file   Social Connections: Not on file   Intimate Partner Violence: Not on file   Housing Stability: Not on file     Family History:     Family History   Problem Relation Age of Onset    Glaucoma Mother    Diabetes Mother    Kidney disease Mother    Macular degen Mother    Cancer Father    Glaucoma Sister    Macular degen Sister    Macular degen Brother     Physical Exam:     Vitals:   10/19/23 1444   BP: (!) 153/78   Pulse: 71   Resp: 17   SpO2: 97%   Weight: 65 kg (143 lb 3.2 oz)   Height: 1.575 m (5' 2")     Weight: 65 kg (143 lb 3.2 oz)   Body mass index is 26.19 kg/m².    Physical Exam  Constitutional:   Appearance: Normal appearance.   Neck:   Vascular: No carotid bruit.   Comments: Well healed rt neck incision for tavr " "delivery.  Cardiovascular:   Rate and Rhythm: Normal rate and regular rhythm.   Pulses:   Radial pulses are 2+ on the right side and 2+ on the left side.   Femoral pulses are 1+ on the right side and 1+ on the left side.  Dorsalis pedis pulses are detected w/ Doppler on the right side and detected w/ Doppler on the left side.   Abdominal:   Palpations: Abdomen is soft. There is no mass.   Tenderness: There is no abdominal tenderness.   Neurological:   General: No focal deficit present.   Mental Status: She is alert and oriented to person, place, and time.   Sensory: No sensory deficit.   Motor: No weakness.         Laboratory   LABS    CBC  No results found for: "WBC", "HGB", "HEMATOCRIT", "MCV", "MCH", "MCHC", "RDW", "PLT", "MPV", "NEUTROABS", "LYMPHOABS", "MONOABS", "EOSINOABS", "BASOSABS", "NEUTROPCT", "LYMPHOPCT", "MONOPCT", "EOSINOPCT", "BASOPCT", "DIFFTYPE", "RBCMORPHOLOG", "PLTEST"    BMP/CMP  No results found for: "NA", "K", "CL", "CO2", "GLU", "BUN", "LABCREA", "CREATININE", "CALCIUM", "PROT", "ALBUMIN", "BILITOT", "BILIRUBIN", "AST", "ALKPHOS", "ALT", "GFRAA", "GFRNONAA"    Problem List:     There are no problems to display for this patient.    Medications and Orders:     There are no diagnoses linked to this encounter.     Assessment and Plan:     Diagnoses and all orders for this visit:    Stenosis of left carotid artery  - Carotid Duplex Bilateral US (COMPLETE) (Cardiology/Vascular Read); Future    Claudication in peripheral vascular disease (CMS/HCC)    Iliac artery stenosis, bilateral (CMS/HCC)    Leriche syndrome (CMS/HCC)    S/P TAVR (transcatheter aortic valve replacement)    Smoking    Chronic obstructive pulmonary disease, unspecified COPD type (CMS/HCC)    Other orders  - Critical access hospital CASE REQUEST; Future      Aortogram with runoff and possibly bilateral iliac stenting.    Martin Hair MD  3:12 PM  10/19/2023       Electronically signed by Martin Hair MD at 10/30/2023 7:21 AM CDT  "

## 2023-11-01 NOTE — Clinical Note
A percutaneous stick to the right groin was performed. Ultrasound guidance was used to obtain access.

## 2023-11-01 NOTE — Clinical Note
An angiography was performed of the ostial left common iliac arteries via hand injection with 10 mL of contrast Contrast estimated

## 2023-11-01 NOTE — OP NOTE
[facility]  Surgery Department  Operative Note    SUMMARY     Date of Procedure: 11/1/2023     Procedure: Procedure(s) (LRB):  AORTOGRAM, WITH EXTREMITY RUNOFF (N/A)  ANGIOGRAM BILATERLA LOWER (N/A)     Surgeon(s) and Role:     * Martin Hair MD - Primary    Assisting Surgeon: None    Pre-Operative Diagnosis: Claudication [I73.9]    Post-Operative Diagnosis: Post-Op Diagnosis Codes:     * Claudication [I73.9]    Anesthesia: RN IV Sedation    Operative Findings (including complications, if any):  Patent abdominal aorta iliac vessels with normal lower extremity runoff    Description of Technical Procedures:  Abdominal aortogram.  Bilateral lower extremity angiogram.  Left iliac selective catheterization from right femoral access    Right femoral artery was accessed over the femoral head with ultrasound guidance.  Five Greek sheath was placed and Glidewire advanced up to the abdominal aorta Omni flush catheter advanced to the renal arteries abdominal aortogram was performed.  Catheter was pulled down to the bifurcation and bilateral iliac angiogram was performed in AP [place] and HAN projections.  We then went up and over the bifurcation and advanced the catheter down to the left distal external iliac and left lower extremity angiogram was performed.  Catheter was removed and right lower extremity angiogram was performed down the lower leg.  Findings were patent abdominal aorta with bilateral patent renal arteries distal aorta is atherosclerotic but patent.  The common internal external iliac arteries were free of any stenosis.  Bilateral common femoral profunda and SFA were patent popliteal were patent with three-vessel runoff bilaterally.  Multiple obliques were taken at the aortic bifurcation and there was no visible evidence of common iliac stenosis on either side.  Sheath was pulled and pressure was held.    Significant Surgical Tasks Conducted by the Assistant(s), if Applicable:     Estimated  Blood Loss (EBL): * No values recorded between 11/1/2023  8:43 AM and 11/1/2023  9:02 AM *           Implants: * No implants in log *    Specimens:   Specimen (24h ago, onward)      None                    Condition: Good    Disposition: PACU - hemodynamically stable.    Attestation: I was present and scrubbed for the entire procedure.

## 2023-11-02 VITALS
BODY MASS INDEX: 26.31 KG/M2 | HEIGHT: 62 IN | RESPIRATION RATE: 20 BRPM | SYSTOLIC BLOOD PRESSURE: 149 MMHG | OXYGEN SATURATION: 95 % | DIASTOLIC BLOOD PRESSURE: 67 MMHG | HEART RATE: 59 BPM | WEIGHT: 143 LBS

## 2023-11-15 ENCOUNTER — OFFICE VISIT (OUTPATIENT)
Dept: CARDIOLOGY | Facility: CLINIC | Age: 68
End: 2023-11-15
Payer: OTHER GOVERNMENT

## 2023-11-15 VITALS
HEIGHT: 62 IN | OXYGEN SATURATION: 96 % | WEIGHT: 141 LBS | SYSTOLIC BLOOD PRESSURE: 140 MMHG | DIASTOLIC BLOOD PRESSURE: 82 MMHG | HEART RATE: 75 BPM | BODY MASS INDEX: 25.95 KG/M2

## 2023-11-15 DIAGNOSIS — I73.9 PERIPHERAL ARTERIAL DISEASE: ICD-10-CM

## 2023-11-15 DIAGNOSIS — H35.30 MACULAR DEGENERATION OF BOTH EYES, UNSPECIFIED TYPE: ICD-10-CM

## 2023-11-15 DIAGNOSIS — Z86.79 HISTORY OF AORTIC STENOSIS: ICD-10-CM

## 2023-11-15 DIAGNOSIS — I77.1 BILATERAL ILIAC ARTERY STENOSIS: ICD-10-CM

## 2023-11-15 DIAGNOSIS — E78.5 DYSLIPIDEMIA: ICD-10-CM

## 2023-11-15 DIAGNOSIS — Z91.148 NON COMPLIANCE W MEDICATION REGIMEN: ICD-10-CM

## 2023-11-15 DIAGNOSIS — F17.200 TOBACCO DEPENDENCE: ICD-10-CM

## 2023-11-15 DIAGNOSIS — J40 BRONCHITIS: ICD-10-CM

## 2023-11-15 DIAGNOSIS — Z95.3 S/P TAVR (TRANSCATHETER AORTIC VALVE REPLACEMENT), BIOPROSTHETIC: Primary | ICD-10-CM

## 2023-11-15 DIAGNOSIS — Z86.73 HISTORY OF CVA (CEREBROVASCULAR ACCIDENT): ICD-10-CM

## 2023-11-15 DIAGNOSIS — H43.393 FLOATERS, BILATERAL: ICD-10-CM

## 2023-11-15 PROCEDURE — 99214 PR OFFICE/OUTPT VISIT, EST, LEVL IV, 30-39 MIN: ICD-10-PCS | Mod: S$PBB,,, | Performed by: NURSE PRACTITIONER

## 2023-11-15 PROCEDURE — 99214 OFFICE O/P EST MOD 30 MIN: CPT | Mod: S$PBB,,, | Performed by: NURSE PRACTITIONER

## 2023-11-15 PROCEDURE — 99214 OFFICE O/P EST MOD 30 MIN: CPT | Mod: PBBFAC,PN | Performed by: NURSE PRACTITIONER

## 2023-11-15 PROCEDURE — 99999 PR PBB SHADOW E&M-EST. PATIENT-LVL IV: CPT | Mod: PBBFAC,,, | Performed by: NURSE PRACTITIONER

## 2023-11-15 PROCEDURE — 99999 PR PBB SHADOW E&M-EST. PATIENT-LVL IV: ICD-10-PCS | Mod: PBBFAC,,, | Performed by: NURSE PRACTITIONER

## 2023-11-15 RX ORDER — ALBUTEROL SULFATE 90 UG/1
2 AEROSOL, METERED RESPIRATORY (INHALATION) EVERY 6 HOURS PRN
Qty: 18 G | Refills: 0 | Status: SHIPPED | OUTPATIENT
Start: 2023-11-15 | End: 2024-03-25

## 2023-11-15 RX ORDER — EZETIMIBE 10 MG/1
10 TABLET ORAL DAILY
Qty: 90 TABLET | Refills: 3 | Status: SHIPPED | OUTPATIENT
Start: 2023-11-15 | End: 2024-11-14

## 2023-11-15 RX ORDER — IPRATROPIUM BROMIDE AND ALBUTEROL SULFATE 2.5; .5 MG/3ML; MG/3ML
3 SOLUTION RESPIRATORY (INHALATION) EVERY 6 HOURS PRN
Qty: 75 ML | Refills: 3 | Status: SHIPPED | OUTPATIENT
Start: 2023-11-15

## 2023-11-15 RX ORDER — ASPIRIN 81 MG/1
81 TABLET ORAL DAILY
Qty: 90 TABLET | Refills: 3 | Status: SHIPPED | OUTPATIENT
Start: 2023-11-15 | End: 2024-11-14

## 2023-11-15 RX ORDER — ATORVASTATIN CALCIUM 20 MG/1
20 TABLET, FILM COATED ORAL DAILY
Qty: 90 TABLET | Refills: 3 | Status: SHIPPED | OUTPATIENT
Start: 2023-11-15 | End: 2023-11-15

## 2023-11-15 NOTE — PROGRESS NOTES
[facility]    Subjective:     Patient ID:  Elissa Raymond is a 68 y.o. female patient here for evaluation Follow-up and Dizziness (Light headed and vision goes )      HPI    Ms. Raymond is here for a follow up visit. She is not taking any medications besides using her inhalers. She is not on ASA or Cholesterol medicine. She has carotid disease. She has a history of stroke. She has PAD. She is seeing Dr. Hair tomorrow. She continue to smoke.         Review of Systems   Constitutional:  Negative for fever.   Eyes:  Positive for blurred vision.   Respiratory:  Negative for shortness of breath.    Cardiovascular:  Negative for chest pain and leg swelling.   Neurological:  Positive for dizziness.        Past Medical History:   Diagnosis Date    Hyperlipidemia     Hypertension     Stroke     x 3    Tobacco use 11/02/2017    Unspecified macular degeneration        Past Surgical History:   Procedure Laterality Date    ANGIOGRAPHY OF LOWER EXTREMITY N/A 11/1/2023    Procedure: ANGIOGRAM BILATERLA LOWER;  Surgeon: Martin Hair MD;  Location: [facility] CATH/EP LAB;  Service: General;  Laterality: N/A;    AORTOGRAPHY WITH EXTREMITY RUNOFF N/A 11/1/2023    Procedure: AORTOGRAM, WITH EXTREMITY RUNOFF;  Surgeon: Martin Hair MD;  Location: [facility] CATH/EP LAB;  Service: General;  Laterality: N/A;    BLADDER SURGERY      CATARACT EXTRACTION      CHOLECYSTECTOMY      HYSTERECTOMY      KNEE SURGERY Left     TONSILLECTOMY         Family History   Problem Relation Age of Onset    Diabetes Mother     Kidney disease Mother     Glaucoma Mother     Macular degeneration Mother     Cancer Father     Glaucoma Sister     Macular degeneration Sister     Macular degeneration Brother        Social History     Socioeconomic History    Marital status:    Tobacco Use    Smoking status: Every Day     Current packs/day: 1.00     Average packs/day: 1 pack/day for 46.0 years (46.0  ttl pk-yrs)     Types: Cigarettes    Smokeless tobacco: Never   Substance and Sexual Activity    Alcohol use: Yes     Comment: once a month or less    Drug use: No    Sexual activity: Yes     Partners: Male     Birth control/protection: None   Social History Narrative    6/6/18: lives with Arpit, also a patient of mine. There are two dogs at home. She is the only smoker at home. She has two kids, they are is [place].        Current Outpatient Medications   Medication Sig Dispense Refill    ascorbic acid, vitamin C, (VITAMIN C) 250 MG tablet Take 1 tablet by mouth once daily.      budesonide-formoterol 160-4.5 mcg (SYMBICORT) 160-4.5 mcg/actuation HFAA Inhale 2 puffs into the lungs 2 (two) times daily.      CAPSICUM, CAYENNE, ORAL Take 1 capsule by mouth Daily.      cholecalciferol, vitamin D3, 1,250 mcg (50,000 unit) capsule Take 50,000 Units by mouth.      cinnamon bark (CINNAMON) 500 mg capsule Take 500 mg by mouth once daily.      ergocalciferol (ERGOCALCIFEROL) 50,000 unit Cap Take 1 capsule (50,000 Units total) by mouth every 7 days. 12 capsule 0    ginger root (GINGER EXTRACT ORAL) Take 1 capsule by mouth once daily.      albuterol (PROAIR HFA) 90 mcg/actuation inhaler Inhale 2 puffs into the lungs every 6 (six) hours as needed for Wheezing. Rescue 18 g 0    albuterol-ipratropium (DUO-NEB) 2.5 mg-0.5 mg/3 mL nebulizer solution Take 3 mLs by nebulization every 6 (six) hours as needed for Wheezing. Rescue 75 mL 3    aspirin (ECOTRIN) 81 MG EC tablet Take 1 tablet (81 mg total) by mouth once daily. 90 tablet 3    ezetimibe (ZETIA) 10 mg tablet Take 1 tablet (10 mg total) by mouth once daily. 90 tablet 3    OXYGEN-AIR DELIVERY SYSTEMS MISC by [facility]Non-Drug; Combo Route) route. 3 L by nasal route       No current facility-administered medications for this visit.       Review of patient's allergies indicates:   Allergen Reactions    Iodinated contrast media Shortness Of Breath    Pcn [penicillins] Other (See  Comments)     Unable to obtain         Objective:        Vitals:    11/15/23 1128   BP: (!) 140/82   Pulse: 75       Physical Exam  Vitals reviewed.   Constitutional:       Appearance: Normal appearance.   HENT:      Head: Normocephalic.   Eyes:      Pupils: Pupils are equal, round, and reactive to light.   Cardiovascular:      Rate and Rhythm: Normal rate and regular rhythm.      Pulses: Normal pulses.      Heart sounds: Normal heart sounds.   Pulmonary:      Effort: Pulmonary effort is normal.      Breath sounds: Normal breath sounds.   Skin:     General: Skin is warm.      Capillary Refill: Capillary refill takes less than 2 seconds.   Neurological:      General: No focal deficit present.      Mental Status: She is alert and oriented to person, place, and time.         LIPIDS - LAST 2   Lab Results   Component Value Date    CHOL 219 (H) 06/06/2018    CHOL 239 (H) 09/20/2017    HDL 55 06/06/2018    HDL 56 09/20/2017    LDLCALC 140.8 06/06/2018    LDLCALC 161.6 (H) 09/20/2017    TRIG 116 06/06/2018    TRIG 107 09/20/2017    CHOLHDL 25.1 06/06/2018    CHOLHDL 23.4 09/20/2017       CBC - LAST 2  Lab Results   Component Value Date    WBC 8.84 10/30/2023    WBC 10.98 09/06/2018    RBC 4.88 10/30/2023    RBC 4.88 09/06/2018    HGB 14.9 10/30/2023    HGB 14.9 09/06/2018    HCT 44.7 10/30/2023    HCT 45.8 09/06/2018    MCV 92 10/30/2023    MCV 94 09/06/2018    MCH 30.5 10/30/2023    MCH 30.5 09/06/2018    MCHC 33.3 10/30/2023    MCHC 32.5 09/06/2018    RDW 13.7 10/30/2023    RDW 13.1 09/06/2018     (L) 10/30/2023     09/06/2018    MPV 11.5 10/30/2023    MPV 12.5 09/06/2018    GRAN 6.0 09/06/2018    GRAN 54.9 09/06/2018    LYMPH 3.2 09/06/2018    LYMPH 29.3 09/06/2018    MONO 0.8 09/06/2018    MONO 7.7 09/06/2018    BASO 0.10 09/06/2018    BASO 0.09 06/06/2018    NRBC 0 09/06/2018    NRBC 0 06/06/2018       CHEMISTRY & LIVER FUNCTION - LAST 2  Lab Results   Component Value Date     10/30/2023      06/06/2018    K 3.8 10/30/2023    K 4.1 06/06/2018     10/30/2023     06/06/2018    CO2 27 10/30/2023    CO2 26 06/06/2018    ANIONGAP 5 (L) 10/30/2023    ANIONGAP 8 06/06/2018    BUN 14 10/30/2023    BUN 19 06/06/2018    CREATININE 1.1 10/30/2023    CREATININE 0.9 06/06/2018    GLU 77 10/30/2023    GLU 70 06/06/2018    CALCIUM 9.4 10/30/2023    CALCIUM 9.8 06/06/2018    ALBUMIN 3.9 06/06/2018    ALBUMIN 3.7 05/16/2018    PROT 6.8 06/06/2018    PROT 6.7 05/16/2018    ALKPHOS 67 06/06/2018    ALKPHOS 64 05/16/2018    ALT 8 (L) 06/06/2018    ALT 9 (L) 05/16/2018    AST 17 06/06/2018    AST 17 05/16/2018    BILITOT 0.4 06/06/2018    BILITOT 0.6 05/16/2018        CARDIAC PROFILE - LAST 2  Lab Results   Component Value Date    BNP 13 09/20/2017    CPK 75 09/20/2017    TROPONINI 0.013 05/16/2018    TROPONINI <0.006 09/20/2017        COAGULATION - LAST 2  Lab Results   Component Value Date    INR 0.9 10/30/2023    INR 1.0 09/20/2017    APTT 27.9 10/30/2023       ENDOCRINE & PSA - LAST 2  Lab Results   Component Value Date    HGBA1C 5.9 08/24/2021    HGBA1C 5.0 06/06/2018    TSH 1.093 06/06/2018    TSH 1.391 09/20/2017        ECHOCARDIOGRAM RESULTS  No results found for this or any previous visit.      CURRENT/PREVIOUS VISIT EKG  Results for orders placed or performed during the hospital encounter of 10/30/23   EKG 12-lead    Collection Time: 10/30/23 11:46 AM    Narrative    Test Reason : Z01.810,    Vent. Rate : 057 BPM     Atrial Rate : 057 BPM     P-R Int : 152 ms          QRS Dur : 082 ms      QT Int : 464 ms       P-R-T Axes : 079 074 073 degrees     QTc Int : 451 ms    Sinus bradycardia  Nonspecific ST abnormality  Abnormal ECG  When compared with ECG of 11-SEP-2023 10:17,  Sinus rhythm is no longer with 2nd degree A-V block  Nonspecific T wave abnormality no longer evident in Inferior leads  Confirmed by Alcides PHILIPPE, Luis Alfredo BOURGEOIS ([id number]) on 11/5/2023 2:03:23 PM    Referred By:             Confirmed By:Luis Alfredo  CHRISTELLE Mcgrath MD         Assessment:       1. S/p TAVR (transcatheter aortic valve replacement), bioprosthetic    2. Bronchitis    3. Peripheral arterial disease    4. History of aortic stenosis    5. Tobacco dependence    6. Dyslipidemia    7. Bilateral iliac artery stenosis    8. History of CVA (cerebrovascular accident)    9. Floaters, bilateral    10. Macular degeneration of both eyes, unspecified type    11. Non compliance w medication regimen           Plan:           Patient does not take medications as prescribed or recommended.  Recommend ASA 81 mg daily  Start Zetia   Follow up with Laxmi regarding carotid and peripheral disease  Patient and daughter counseled on her risk of stroke      Problem List Items Addressed This Visit          Neuro    History of CVA (cerebrovascular accident)       Ophtho    Macular degeneration of both eyes    Floaters, bilateral       Pulmonary    Bronchitis       Cardiac/Vascular    Dyslipidemia    Peripheral arterial disease    Bilateral iliac artery stenosis    History of aortic stenosis    S/p TAVR (transcatheter aortic valve replacement), bioprosthetic - Primary       Palliative Care    Non compliance w medication regimen       Other    Tobacco dependence          [facility]  [place]

## 2024-02-29 ENCOUNTER — TELEPHONE (OUTPATIENT)
Dept: CARDIOLOGY | Facility: CLINIC | Age: 69
End: 2024-02-29
Payer: MEDICARE

## 2024-02-29 ENCOUNTER — TELEPHONE (OUTPATIENT)
Dept: NEUROLOGY | Facility: CLINIC | Age: 69
End: 2024-02-29
Payer: MEDICARE

## 2024-02-29 NOTE — TELEPHONE ENCOUNTER
Spoke with the pt, reports recent hospital visit to [facility] in [place], transferred to [facility], treated for stroke. The pt was instructed to followup with neuro. Appt scheduled, records and imaging disc requested, reminder mailed to the pt.

## 2024-02-29 NOTE — TELEPHONE ENCOUNTER
----- Message from Kay Hicks sent at 2/29/2024 10:30 AM CST -----  Type: Need Medical Advice   Who Called: patient   Best callback number: [phone]:  Symptoms: 2 strokes   Additional Information: patient was discharged from [facility] on 2/23 and called to schedule a hospital  f/u per discharges instructions  Please call to further assist with scheduling, Thanks.

## 2024-02-29 NOTE — TELEPHONE ENCOUNTER
I called and spoke to this patient . I will send a request to [facility] . Dr Islas will have to review at another date. As for [facility] , the information is already in her chart.

## 2024-02-29 NOTE — TELEPHONE ENCOUNTER
----- Message from Alex Wallace sent at 2/29/2024 10:24 AM CST -----  Regarding: return call  Contact: patient  Type:  Patient Returning Call    Who Called:patient  Who Left Message for Patient:office nurse  Does the patient know what this is regarding?:please have MD Islas review records from [facility] and [facility]  Would the patient rather a call back or a response via [facility]? Please advise  Best Call Back Number:[phone]  Additional Information:

## 2024-03-07 ENCOUNTER — TELEPHONE (OUTPATIENT)
Dept: NEUROLOGY | Facility: CLINIC | Age: 69
End: 2024-03-07
Payer: MEDICARE

## 2024-03-12 ENCOUNTER — TELEPHONE (OUTPATIENT)
Dept: CARDIOLOGY | Facility: CLINIC | Age: 69
End: 2024-03-12
Payer: MEDICARE

## 2024-03-12 ENCOUNTER — TELEPHONE (OUTPATIENT)
Dept: NEUROLOGY | Facility: CLINIC | Age: 69
End: 2024-03-12
Payer: MEDICARE

## 2024-03-14 ENCOUNTER — PATIENT MESSAGE (OUTPATIENT)
Dept: CARDIOLOGY | Facility: CLINIC | Age: 69
End: 2024-03-14
Payer: MEDICARE

## 2024-03-23 DIAGNOSIS — J40 BRONCHITIS: ICD-10-CM

## 2024-03-25 RX ORDER — ALBUTEROL SULFATE 90 UG/1
2 AEROSOL, METERED RESPIRATORY (INHALATION) EVERY 6 HOURS PRN
Qty: 18 G | Refills: 0 | Status: SHIPPED | OUTPATIENT
Start: 2024-03-25 | End: 2024-04-18

## 2024-03-28 ENCOUNTER — TELEPHONE (OUTPATIENT)
Dept: NEUROLOGY | Facility: CLINIC | Age: 69
End: 2024-03-28
Payer: MEDICARE

## 2024-03-28 NOTE — TELEPHONE ENCOUNTER
Fax  sent over for medical records----- Message from Rodger Hoang sent at 3/28/2024 11:55 AM CDT -----  Regarding: advice  Contact: ERA WILHELM [[id number]]  Type: Needs Medical Advice  Who Called:  Era    Symptoms (please be specific):  na    How long has patient had these symptoms:  na    Pharmacy name and phone #:  na    Best Call Back Number: [phone]    Additional Information: Needs a request for Med Records faxed to [place], [phone] request must state records need to be faxed. Records are needed for her upcoming appt with HERMINIA [facility]. Please call to advise.

## 2024-03-28 NOTE — TELEPHONE ENCOUNTER
Spoke with the pt, she confirmed her appt on 4/1/24.  She will bring the disc of MRI and CT to the visit.  She is calling [place] medical records to get the records faxed to the office.  She is legally blind and has transportation issues and has a ride to the appt.

## 2024-04-01 ENCOUNTER — OFFICE VISIT (OUTPATIENT)
Dept: NEUROLOGY | Facility: CLINIC | Age: 69
End: 2024-04-01
Payer: MEDICARE

## 2024-04-01 ENCOUNTER — TELEPHONE (OUTPATIENT)
Dept: CARDIOLOGY | Facility: CLINIC | Age: 69
End: 2024-04-01
Payer: MEDICARE

## 2024-04-01 VITALS
RESPIRATION RATE: 18 BRPM | HEIGHT: 62 IN | WEIGHT: 141 LBS | HEART RATE: 70 BPM | SYSTOLIC BLOOD PRESSURE: 156 MMHG | BODY MASS INDEX: 25.95 KG/M2 | DIASTOLIC BLOOD PRESSURE: 88 MMHG

## 2024-04-01 DIAGNOSIS — E78.5 DYSLIPIDEMIA: ICD-10-CM

## 2024-04-01 DIAGNOSIS — I63.441 CEREBROVASCULAR ACCIDENT (CVA) DUE TO EMBOLISM OF RIGHT CEREBELLAR ARTERY: Primary | ICD-10-CM

## 2024-04-01 DIAGNOSIS — Z91.148 NON COMPLIANCE W MEDICATION REGIMEN: ICD-10-CM

## 2024-04-01 DIAGNOSIS — F17.200 TOBACCO DEPENDENCE: ICD-10-CM

## 2024-04-01 PROCEDURE — 99215 OFFICE O/P EST HI 40 MIN: CPT | Mod: PBBFAC,PO | Performed by: NURSE PRACTITIONER

## 2024-04-01 PROCEDURE — 99999 PR PBB SHADOW E&M-EST. PATIENT-LVL V: CPT | Mod: PBBFAC,,, | Performed by: NURSE PRACTITIONER

## 2024-04-01 PROCEDURE — 99205 OFFICE O/P NEW HI 60 MIN: CPT | Mod: S$GLB,,, | Performed by: NURSE PRACTITIONER

## 2024-04-01 RX ORDER — ATORVASTATIN CALCIUM 40 MG/1
40 TABLET, FILM COATED ORAL DAILY
Qty: 90 TABLET | Refills: 0 | Status: ON HOLD | OUTPATIENT
Start: 2024-04-01 | End: 2024-06-11 | Stop reason: HOSPADM

## 2024-04-01 NOTE — PATIENT INSTRUCTIONS
Continue taking aspirin 81 mg and Lipitor every day  Please follow up with your cardiologist and PCP soon   - inquire about anticoagulant    ---------------------------------------------------------------------------------------------------------------    You have been diagnosed with a stroke, or with a TIA (transient ischemic attack). Or you have been identified as having a high risk for stroke. During a stroke, blood stops flowing to part of your brain. This can damage areas in the brain that control other parts of the body. Symptoms after a stroke depend on which part of the brain has been affected.      Stroke risk factors  Once youve had a stroke, youre at greater risk for another one. Listed below are some other factors that can increase your risk for a stroke:  High blood pressure  High cholesterol  Cigarette or cigar smoking  Diabetes  Carotid or other artery disease  Atrial fibrillation, atrial flutter, or other heart disease  Not being physically active  Obesity  Certain blood disorders (such as sickle cell anemia)  Excessive alcohol use  Abuse of street drugs  Race  Gender  Family history of stroke  Diet high in salty, fried, or greasy foods    Changes in daily living  Doing your regular tasks may be difficult after youve had a stroke, but you can learn new ways to manage your daily activities. In fact, doing daily activities may help you to regain muscle strength and bring back function to affected limbs. Be patient, give yourself time to adjust, and appreciate the progress you make.    Daily activities  You may be at risk of falling. Make changes to your home to help you walk more easily. A therapist will decide if you need an assistive device to walk safely.  You may need to see an occupational therapist or physical therapist to learn new ways of doing things. For example, you may need to make adjustments when bathing or dressing:    Tips for showering or bathing  Test the water temperature with a  hand or foot that was not affected by the stroke.  Use grab bars, a shower seat, a hand-held showerhead, and a long-handled brush.    Tips for getting dressed  Dress while sitting, starting with the affected side or limb.  Wear shirts that pull easily over your head. Wear pants or skirts with elastic waistbands.  Use zippers with loops attached to the pull tabs.    Lifestyle changes  Take your medicines exactly as directed. Dont skip doses.  Begin an exercise program. Ask your provider how to get started. Also ask how much activity you should try to get on a daily or weekly basis. You can benefit from simple activities such as walking or gardening.  Limit alcohol intake. Men should have no more than 2 alcoholic drinks a day. Women should limit themselves to 1 alcoholic drink per day.  Know your cholesterol level. Follow your providers recommendations about how to keep cholesterol under control.  If you are a smoker, quit now. Join a stop-smoking program to improve your chances of success. Ask your provider about medicines or other methods to help you quit.  Learn stress management techniques to help you deal with stress in your home and work life.    Diet  Your healthcare provider will give you information on dietary changes that you may need to make, based on your situation. Your provider may recommend that you see a registered dietitian for help with diet changes. Changes may include:  Reducing fat and cholesterol intake  Reducing salt (sodium) intake, especially if you have high blood pressure  Eating more fresh vegetables and fruits  Eating more lean proteins, such as fish, poultry, and beans and peas (legumes)  Eating less red meat and processed meats  Using low-fat dairy products  Limiting vegetable oils and nut oils  Limiting sweets and processed foods such as chips, cookies, and baked goods    Follow-up care  Keep your medical appointments. Close follow-up is important to stroke rehabilitation and  recovery.  Some medicines require blood tests to check for progress or problems. Keep follow-up appointments for any blood tests ordered by your providers    When to call 911  Call 911 right away if you have any of the following symptoms of stroke:  Weakness, tingling, or loss of feeling on one side of your face or body  Sudden double vision or trouble seeing in one or both eyes  Sudden trouble talking or slurred speech  Trouble understanding others  Sudden, severe headache  Dizziness, loss of balance, or a sense of falling  Blackouts or seizures      F.A.S.T. is an easy way to remember the signs of stroke. When you see these signs, you know that you need to call 911 fast.  F.A.S.T. stands for:  F is for face drooping. One side of the face is drooping or numb. When the person smiles, the smile is uneven.  A is for arm weakness. One arm is weak or numb. When the person lifts both arms at the same time, one arm may drift downward.  S is for speech difficulty. You may notice slurred speech or trouble speaking. The person can't repeat a simple sentence correctly when asked.  T is for time to call 911. If someone shows any of these symptoms, even if they go away, call 911 immediately. Make note of the time the symptoms first appeared.     © 0235-2259 [employer]. [address] [place] [address], [place] [address]. All rights reserved. This information is not intended as a substitute for professional medical care. Always follow your healthcare professional's instructions.

## 2024-04-01 NOTE — ASSESSMENT & PLAN NOTE
Vascular risk factor  LDL reportedly elevated during most recent hosp stay   Recommend she continue HI statin for LDL target < 70   Consent: The patient's consent was obtained including but not limited to risks of crusting, scabbing, blistering, scarring, darker or lighter pigmentary change, recurrence, incomplete removal and infection. Application Tool (Optional): Liquid Nitrogen Sprayer Render Post-Care Instructions In Note?: no Detail Level: Detailed Show Applicator Variable?: Yes Duration Of Freeze Thaw-Cycle (Seconds): 5 Post-Care Instructions: I reviewed with the patient in detail post-care instructions. Patient is to wear sunprotection, and avoid picking at any of the treated lesions. Pt may apply Vaseline to crusted or scabbing areas.

## 2024-04-01 NOTE — PROGRESS NOTES
NEUROLOGY  Outpatient Consultation Visit     [facility]  [address] [facility], [place] [address]  [phone] (office) / [phone] (fax)    Patient Name:  Elissa Raymond  :  1955  MR #:  [id number]  Acct #:  [id number]    Date of Neurology Consult: 2024  Name of Provider: JOSHUA Munoz    Other Physicians:  Scar Hussein MD (Primary Care Physician); Self, Aaareferral (Referring)      Chief Complaint: Hospital Follow Up      History of Present Illness (HPI):  Elissa Raymodn is a right handed 68 y.o. female with a PMHX of HLD, HTN, smoker, stroke, legally blind      Patient presents today following a recent hospital admit in February. She states that day she stood up from her bed and fell. She was unable to get up and she couldn't use her left arm. She eventually was able to get up and a family member called EMS. She was taken to a local hospital in [place] and then transferred to [place]. MRI brain reported with right cerebellar and right internal capsule infarcts. Embolic etiology was suspeceted. She was started on Eliquis and Aspirin as per Neuro team.   She was previously taking Aspirin on occasion. She has not been taking Eliquis stating her insurance wont cover it. She has not yet seen her cardiologist.   She was also started on a high intensity statin but has not been taking it. She has not yet seen her PCP since her stroke.   She has completed home therapy and believes overall she has been doing well.   She continues to smoke cigarettes, about a little over half a pack per day.             Past Medical, Surgical, Family & Social History:   Past Medical History:   Diagnosis Date    Hyperlipidemia     Hypertension     Stroke     x 3    Tobacco use 2017    Unspecified macular degeneration      Past Surgical History:   Procedure Laterality Date    ANGIOGRAPHY OF LOWER EXTREMITY N/A 2023    Procedure: ANGIOGRAM BILATERLA LOWER;  Surgeon: Martin Hair,  MD;  Location: [facility] CATH/EP LAB;  Service: General;  Laterality: N/A;    AORTOGRAPHY WITH EXTREMITY RUNOFF N/A 11/1/2023    Procedure: AORTOGRAM, WITH EXTREMITY RUNOFF;  Surgeon: Martin Hair MD;  Location: [facility] CATH/EP LAB;  Service: General;  Laterality: N/A;    BLADDER SURGERY      CATARACT EXTRACTION      CHOLECYSTECTOMY      HYSTERECTOMY      KNEE SURGERY Left     TONSILLECTOMY       Family History   Problem Relation Age of Onset    Diabetes Mother     Kidney disease Mother     Glaucoma Mother     Macular degeneration Mother     Cancer Father     Glaucoma Sister     Macular degeneration Sister     Macular degeneration Brother      Alcohol use:  reports current alcohol use.   (Of note, 0.6 oz = 1 beer or 6 oz = 10 beers).  Tobacco use:  reports that she has been smoking cigarettes. She has a 46.0 pack-year smoking history. She has never used smokeless tobacco.  Street drug use:  reports no history of drug use.  Allergies: Iodinated contrast media and Pcn [penicillins].    Home Medications:     Current Outpatient Medications:     albuterol (PROVENTIL/VENTOLIN HFA) 90 mcg/actuation inhaler, INHALE 2 PUFFS INTO THE LUNGS EVERY 6 HOURS AS NEEDED FOR WHEEZING, Disp: 18 g, Rfl: 0    albuterol-ipratropium (DUO-NEB) 2.5 mg-0.5 mg/3 mL nebulizer solution, Take 3 mLs by nebulization every 6 (six) hours as needed for Wheezing. Rescue, Disp: 75 mL, Rfl: 3    aspirin (ECOTRIN) 81 MG EC tablet, Take 1 tablet (81 mg total) by mouth once daily., Disp: 90 tablet, Rfl: 3    CAPSICUM, CAYENNE, ORAL, Take 1 capsule by mouth Daily., Disp: , Rfl:     cholecalciferol, vitamin D3, 1,250 mcg (50,000 unit) capsule, Take 50,000 Units by mouth., Disp: , Rfl:     cinnamon bark (CINNAMON) 500 mg capsule, Take 500 mg by mouth once daily., Disp: , Rfl:     ascorbic acid, vitamin C, (VITAMIN C) 250 MG tablet, Take 1 tablet by mouth once daily., Disp: , Rfl:     budesonide-formoterol 160-4.5 mcg (SYMBICORT) 160-4.5 mcg/actuation HFAA,  "Inhale 2 puffs into the lungs 2 (two) times daily., Disp: , Rfl:     ergocalciferol (ERGOCALCIFEROL) 50,000 unit Cap, Take 1 capsule (50,000 Units total) by mouth every 7 days. (Patient not taking: Reported on 4/1/2024), Disp: 12 capsule, Rfl: 0    ezetimibe (ZETIA) 10 mg tablet, Take 1 tablet (10 mg total) by mouth once daily. (Patient not taking: Reported on 4/1/2024), Disp: 90 tablet, Rfl: 3    cole root (COLE EXTRACT ORAL), Take 1 capsule by mouth once daily., Disp: , Rfl:     OXYGEN-AIR DELIVERY SYSTEMS MISC, by Misc.(Non-Drug; Combo Route) route. 3 L by nasal route, Disp: , Rfl:     Physical Examination:  BP (!) 156/88 (BP Location: Left arm, Patient Position: Sitting, BP Method: Small (Automatic))   Pulse 70   Resp 18   Ht 5' 2" (1.575 m)   Wt 64 kg (140 lb 15.8 oz)   BMI 25.79 kg/m²     GENERAL:  General appearance: Well, non-toxic appearing.  No apparent distress.  Neck: supple.  .    MENTAL STATUS:  Alertness, attention span & concentration: normal.  Language: normal.  Orientation to self, place & time:  normal.  Memory, recent & remote: normal.  Fund of knowledge: normal.      SPEECH:  Clear and fluent.  Follows complex commands.      CRANIAL NERVES:  Cranial Nerves II-XII were examined.  II - Visual fields: legally blind  III, IV, VI: PERRL, EOMI, No ptosis, No nystagmus.  V - Facial sensation: normal.  VII - Face symmetry & mobility: normal.  VIII - Hearing: [place]  IX, X - Palate: mobile & midline.  XI - Shoulder shrug: normal.  XII - Tongue protrusion: normal.        GROSS MOTOR:  Gait & station: slightly antalgic gait from left hip pain   Tone: normal.  Abnormal movements: none.  Finger-nose: normal.  Rapid alternating movements: normal.  Pronator drift: normal      MUSCLE STRENGTH:   Hand grasp:   - right:5/5   - left:4+/5    RIGHT    LEFT   5 Deltoids 5   5 Biceps 5   5 Triceps 5   5 Forearm.Pr. 5        5 Iliopsoas flex    4+   5 Hip Abduct 5   5 Hip Adduct 5   5 Quads 5   5 Hams 5   5 " "Dorsiflex 5   5 Plantar Flex 5   5 Ankle Eliel 5   5 Ankle Invert 5     REFLEXES:    RIGHT Reflex   LEFT   2+ Biceps 2+   2+ Brachiorad. 2+        2+ Patellar 2+     SENSORY:  Light touch: Normal throughout.               Diagnostic Data Reviewed:   I have personally reviewed provider notes, labs and imaging made available to me today.     Imaging:  MRI brain (outside) 2/2024:  "FINDINGS:     The DWI demonstrates moderately intense linear signal noted in the mid to superior right cerebellar hemisphere. Corresponding decreased signal on ADC, most consistent with acute infarct. Additional foci of increased signal along the posterior limb of the right internal capsule, with corresponding decreased signal on ADC, consistent with small acute infarcts.     No intracranial mass, mass effect or acute hemorrhage. Mild cerebral atrophy. Ventricles are unremarkable. Mild periventricular chronic microvascular ischemic change.     Mild mucosal thickening in the left ethmoid air cells. Mastoid air cells are clear.   Impression      1. Small acute infarct in the mid to superior aspect of right cerebellar hemisphere. Additional small acute infarct posteriorly on the right internal capsule.    2. No acute hemorrhage.    3. Mild chronic periventricular microvascular disease."        Cardiac:  CUS 2/2024    Labs:  Lab Results   Component Value Date    WBC 8.84 10/30/2023    HGB 14.9 10/30/2023    HCT 44.7 10/30/2023     (L) 10/30/2023    MCV 92 10/30/2023    RDW 13.7 10/30/2023     Lab Results   Component Value Date     10/30/2023    K 3.8 10/30/2023     10/30/2023    CO2 27 10/30/2023    BUN 14 10/30/2023    CREATININE 1.1 10/30/2023    GLU 77 10/30/2023    CALCIUM 9.4 10/30/2023     Lab Results   Component Value Date    PROT 6.8 06/06/2018    ALBUMIN 3.9 06/06/2018    BILITOT 0.4 06/06/2018    AST 17 06/06/2018    ALKPHOS 67 06/06/2018    ALT 8 (L) 06/06/2018     Lab Results   Component Value Date    INR 0.9 " "10/30/2023     Lab Results   Component Value Date    CHOL 219 (H) 06/06/2018    HDL 55 06/06/2018    LDLCALC 140.8 06/06/2018    TRIG 116 06/06/2018    CHOLHDL 25.1 06/06/2018     Lab Results   Component Value Date    HGBA1C 5.9 08/24/2021      No results found for: "MOSSUECD05"  No results found for: "FOLATE"  Lab Results   Component Value Date    TSH 1.093 06/06/2018     No results found for: "VITAMINB1"  No results found for: "RPR"        Assessment and Plan:  Elissa Raymond is a 68 y.o. female.    Problem List Items Addressed This Visit          Neuro    Cerebrovascular accident (CVA) due to embolism of right cerebellar artery - Primary    Current Assessment & Plan     Pt presented to ED in [place] with new onset left side weakness   - MRI brain scan reported with acute infarcts to right internal capsule and right cerebellar   - pt transferred to [facility] for neuro services  Etiology suspected to be embolic   - do not have TTE or SOPHIA results  CUS reported with < 50% stenosis   Pt placed on Eliquis and ASA but states her insurance would not cover the anticoag. She has been taking her ASA intermittently   - medication compliance questions. Discussed importance of meds at length today  Recommend she take ASA QD and f/u wth cards. Anticoag suggested if afib suspected   - may require ILR??  Pt to f/u with PCP to discuss vascular risk factor management   - management as per PCP moving forward   Smoking cessation and diet discussed  Stroke education provided             Cardiac/Vascular    Dyslipidemia    Current Assessment & Plan     Vascular risk factor  LDL reportedly elevated during most recent hosp stay   Recommend she continue HI statin for LDL target < 70            Palliative Care    Non compliance w medication regimen    Current Assessment & Plan     Discussed importance of compliance             Other    Tobacco dependence    Current Assessment & Plan     Dangers of cigarette smoking were reviewed with " patient in detail. Patient was Counseled for 3-10 minutes. Nicotine replacement options were discussed. Nicotine replacement was discussed- prescribed                            Important to note, also  has a past medical history of Hyperlipidemia, Hypertension, Stroke, Tobacco use (11/02/2017), and Unspecified macular degeneration.            The patient will return to clinic as needed        All questions were answered and patient is comfortable with the plan.       Thank you very much for the opportunity to assist in this patient's care.    If you have any questions or concerns, please do not hesitate to contact me at any time.    Sincerely,     JOSHUA Munoz  [facility] [place]         I spent a total of 75 minutes on the day of the visit.This includes face to face time and non-face to face time preparing to see the patient (eg, review of tests), Obtaining and/or reviewing separately obtained history, Documenting clinical information in the electronic or other health record, Independently interpreting resultsand communicating results to the patient/family/caregiver, or Care coordination.

## 2024-04-01 NOTE — TELEPHONE ENCOUNTER
----- Message from ALMA Carolina sent at 4/1/2024 10:54 AM CDT -----  Regarding: appt  This patient was seen in my office this morning for stroke follow up. She was placed on Eliquis and aspirin for possible embolic etiology. Patient states she has not been taking the Eliquis because her insurance has not approved it. Id like her to see either Dr. Islas or his GÉNESIS sometime this week to discuss further. In the meantime, I have asked her to continue aspirin daily for now.     Please contact the patient sister in law Hvaen [phone] to schedule the appt.     Thank you!  JOSHUA Munoz  [facility] [place]

## 2024-04-01 NOTE — ASSESSMENT & PLAN NOTE
Pt presented to ED in [place] with new onset left side weakness   - MRI brain scan reported with acute infarcts to right internal capsule and right cerebellar   - pt transferred to [facility] for neuro services  Etiology suspected to be embolic   - do not have TTE or SOPHIA results  CUS reported with < 50% stenosis   Pt placed on Eliquis and ASA but states her insurance would not cover the anticoag. She has been taking her ASA intermittently   - medication compliance questions. Discussed importance of meds at length today  Recommend she take ASA QD and f/u wth cards. Anticoag suggested if afib suspected   - may require ILR??  Pt to f/u with PCP to discuss vascular risk factor management   - management as per PCP moving forward   Smoking cessation and diet discussed  Stroke education provided

## 2024-04-02 ENCOUNTER — TELEPHONE (OUTPATIENT)
Dept: CARDIOLOGY | Facility: CLINIC | Age: 69
End: 2024-04-02
Payer: MEDICARE

## 2024-04-02 NOTE — TELEPHONE ENCOUNTER
----- Message from Aby Mayelin sent at 4/2/2024  9:18 AM CDT -----  Contact: Self  Type:  Sooner Appointment Request    Caller is requesting a sooner appointment.  Caller declined first available appointment listed below.  Caller will not accept being placed on the waitlist and is requesting a message be sent to doctor.    Name of Caller:  Patient  When is the first available appointment?  06/25  Symptoms: [facility] f/u  stroke/ med check  Would the patient rather a call back or a response via [facility]? Call  Best Call Back Number:  [phone]  Additional Information:  Can we please call pt back to assist. Stated she was told by her neurologist that she needs to be seen this week. Thank You.

## 2024-04-05 ENCOUNTER — LAB VISIT (OUTPATIENT)
Dept: LAB | Facility: HOSPITAL | Age: 69
End: 2024-04-05
Attending: INTERNAL MEDICINE
Payer: MEDICARE

## 2024-04-05 ENCOUNTER — OFFICE VISIT (OUTPATIENT)
Dept: CARDIOLOGY | Facility: CLINIC | Age: 69
End: 2024-04-05
Payer: MEDICARE

## 2024-04-05 VITALS
BODY MASS INDEX: 25.6 KG/M2 | WEIGHT: 139.13 LBS | SYSTOLIC BLOOD PRESSURE: 154 MMHG | OXYGEN SATURATION: 96 % | HEART RATE: 77 BPM | DIASTOLIC BLOOD PRESSURE: 88 MMHG | HEIGHT: 62 IN

## 2024-04-05 DIAGNOSIS — I10 ESSENTIAL HYPERTENSION: ICD-10-CM

## 2024-04-05 DIAGNOSIS — E78.5 DYSLIPIDEMIA: ICD-10-CM

## 2024-04-05 DIAGNOSIS — I73.9 PERIPHERAL ARTERIAL DISEASE: ICD-10-CM

## 2024-04-05 DIAGNOSIS — Z86.73 HISTORY OF MULTIPLE STROKES: ICD-10-CM

## 2024-04-05 DIAGNOSIS — Z95.3 S/P TAVR (TRANSCATHETER AORTIC VALVE REPLACEMENT), BIOPROSTHETIC: Primary | ICD-10-CM

## 2024-04-05 DIAGNOSIS — I48.0 PAROXYSMAL ATRIAL FIBRILLATION: ICD-10-CM

## 2024-04-05 LAB
CHOLEST SERPL-MCNC: 220 MG/DL (ref 120–199)
CHOLEST/HDLC SERPL: 4.6 {RATIO} (ref 2–5)
HDLC SERPL-MCNC: 48 MG/DL (ref 40–75)
HDLC SERPL: 21.8 % (ref 20–50)
LDLC SERPL CALC-MCNC: 150.4 MG/DL (ref 63–159)
NONHDLC SERPL-MCNC: 172 MG/DL
TRIGL SERPL-MCNC: 108 MG/DL (ref 30–150)

## 2024-04-05 PROCEDURE — 99999 PR PBB SHADOW E&M-EST. PATIENT-LVL III: CPT | Mod: PBBFAC,,, | Performed by: INTERNAL MEDICINE

## 2024-04-05 PROCEDURE — 36415 COLL VENOUS BLD VENIPUNCTURE: CPT | Performed by: INTERNAL MEDICINE

## 2024-04-05 PROCEDURE — 99213 OFFICE O/P EST LOW 20 MIN: CPT | Mod: PBBFAC,PN,25 | Performed by: INTERNAL MEDICINE

## 2024-04-05 PROCEDURE — 80061 LIPID PANEL: CPT | Performed by: INTERNAL MEDICINE

## 2024-04-05 PROCEDURE — 93005 ELECTROCARDIOGRAM TRACING: CPT | Mod: PBBFAC,PN | Performed by: GENERAL PRACTICE

## 2024-04-05 PROCEDURE — 99214 OFFICE O/P EST MOD 30 MIN: CPT | Mod: S$GLB,,, | Performed by: INTERNAL MEDICINE

## 2024-04-05 PROCEDURE — 93000 ELECTROCARDIOGRAM COMPLETE: CPT | Mod: S$GLB,,, | Performed by: GENERAL PRACTICE

## 2024-04-05 RX ORDER — LOSARTAN POTASSIUM 25 MG/1
25 TABLET ORAL DAILY
Qty: 90 TABLET | Refills: 3 | Status: ON HOLD | OUTPATIENT
Start: 2024-04-05 | End: 2024-06-07

## 2024-04-05 NOTE — PROGRESS NOTES
[facility]    Subjective:     Patient ID:  Elissa Raymond is a 69 y.o. female patient here for evaluation No chief complaint on file.      HPI:  69-year-old female with history of TAVR, nonobstructive CAD.  Here for follow-up after admitting to [place] for stroke.  Started on Eliquis but unable to afford it.  Initially admitted to [place] and then transferred to [place].  Limited records available here do not show any clear evidence of atrial fibrillation.  EKGs mentioned to be in normal rhythm.  Today's EKG shows normal sinus rhythm here.    Review of Systems   All other systems reviewed and are negative.       Past Medical History:   Diagnosis Date    Hyperlipidemia     Hypertension     Stroke     x 3    Tobacco use 11/02/2017    Unspecified macular degeneration        Past Surgical History:   Procedure Laterality Date    ANGIOGRAPHY OF LOWER EXTREMITY N/A 11/1/2023    Procedure: ANGIOGRAM BILATERLA LOWER;  Surgeon: Martin Hair MD;  Location: [facility] CATH/EP LAB;  Service: General;  Laterality: N/A;    AORTOGRAPHY WITH EXTREMITY RUNOFF N/A 11/1/2023    Procedure: AORTOGRAM, WITH EXTREMITY RUNOFF;  Surgeon: Martin Hair MD;  Location: [facility] CATH/EP LAB;  Service: General;  Laterality: N/A;    BLADDER SURGERY      CATARACT EXTRACTION      CHOLECYSTECTOMY      HYSTERECTOMY      KNEE SURGERY Left     TONSILLECTOMY         Family History   Problem Relation Age of Onset    Diabetes Mother     Kidney disease Mother     Glaucoma Mother     Macular degeneration Mother     Cancer Father     Glaucoma Sister     Macular degeneration Sister     Macular degeneration Brother        Social History     Socioeconomic History    Marital status:    Tobacco Use    Smoking status: Every Day     Current packs/day: 1.00     Average packs/day: 1 pack/day for 46.0 years (46.0 ttl pk-yrs)     Types: Cigarettes    Smokeless tobacco: Never   Substance and Sexual  Activity    Alcohol use: Yes     Comment: once a month or less    Drug use: No    Sexual activity: Yes     Partners: Male     Birth control/protection: None   Social History Narrative    6/6/18: lives with Arpit, also a patient of mine. There are two dogs at home. She is the only smoker at home. She has two kids, they are is [place].        Current Outpatient Medications   Medication Sig Dispense Refill    albuterol (PROVENTIL/VENTOLIN HFA) 90 mcg/actuation inhaler INHALE 2 PUFFS INTO THE LUNGS EVERY 6 HOURS AS NEEDED FOR WHEEZING 18 g 0    albuterol-ipratropium (DUO-NEB) 2.5 mg-0.5 mg/3 mL nebulizer solution Take 3 mLs by nebulization every 6 (six) hours as needed for Wheezing. Rescue 75 mL 3    ascorbic acid, vitamin C, (VITAMIN C) 250 MG tablet Take 1 tablet by mouth once daily.      aspirin (ECOTRIN) 81 MG EC tablet Take 1 tablet (81 mg total) by mouth once daily. (Patient taking differently: Take 81 mg by mouth 2 (two) times a day.) 90 tablet 3    atorvastatin (LIPITOR) 40 MG tablet Take 1 tablet (40 mg total) by mouth once daily. 90 tablet 0    CAPSICUM, CAYENNE, ORAL Take 1 capsule by mouth Daily.      ezetimibe (ZETIA) 10 mg tablet Take 1 tablet (10 mg total) by mouth once daily. 90 tablet 3    ginger root (GINGER EXTRACT ORAL) Take 1 capsule by mouth once daily.      budesonide-formoterol 160-4.5 mcg (SYMBICORT) 160-4.5 mcg/actuation HFAA Inhale 2 puffs into the lungs 2 (two) times daily. (Patient not taking: Reported on 4/5/2024)      cholecalciferol, vitamin D3, 1,250 mcg (50,000 unit) capsule Take 50,000 Units by mouth. (Patient not taking: Reported on 4/5/2024)      cinnamon bark (CINNAMON) 500 mg capsule Take 500 mg by mouth once daily. (Patient not taking: Reported on 4/5/2024)      ergocalciferol (ERGOCALCIFEROL) 50,000 unit Cap Take 1 capsule (50,000 Units total) by mouth every 7 days. (Patient not taking: Reported on 4/1/2024) 12 capsule 0    losartan (COZAAR) 25 MG tablet Take 1 tablet (25 mg  total) by mouth once daily. 90 tablet 3    OXYGEN-AIR DELIVERY SYSTEMS MISC by Misc.(Non-Drug; Combo Route) route. 3 L by nasal route (Patient not taking: Reported on 4/5/2024)      rivaroxaban (XARELTO) 20 mg Tab Take 1 tablet (20 mg total) by mouth daily with dinner or evening meal. 60 tablet 0     No current facility-administered medications for this visit.       Review of patient's allergies indicates:   Allergen Reactions    Iodinated contrast media Shortness Of Breath    Pcn [penicillins] Other (See Comments)     Unable to obtain         Objective:        Vitals:    04/05/24 1030   BP: (!) 154/88   Pulse: 77       Physical Exam  Vitals reviewed.   Constitutional:       Appearance: Normal appearance.   HENT:      Mouth/Throat:      Mouth: Mucous membranes are moist.   Eyes:      Extraocular Movements: Extraocular movements intact.      Pupils: Pupils are equal, round, and reactive to light.   Cardiovascular:      Rate and Rhythm: Normal rate and regular rhythm.      Pulses: Normal pulses.      Heart sounds: Normal heart sounds. No murmur heard.     No gallop.   Pulmonary:      Effort: Pulmonary effort is normal.      Breath sounds: Normal breath sounds.   Abdominal:      General: Bowel sounds are normal.      Palpations: Abdomen is soft.   Musculoskeletal:         General: Normal range of motion.   Skin:     General: Skin is warm and dry.   Neurological:      General: No focal deficit present.      Mental Status: She is alert and oriented to person, place, and time.   Psychiatric:         Mood and Affect: Mood normal.         LIPIDS - LAST 2   Lab Results   Component Value Date    CHOL 219 (H) 06/06/2018    CHOL 239 (H) 09/20/2017    HDL 55 06/06/2018    HDL 56 09/20/2017    LDLCALC 140.8 06/06/2018    LDLCALC 161.6 (H) 09/20/2017    TRIG 116 06/06/2018    TRIG 107 09/20/2017    CHOLHDL 25.1 06/06/2018    CHOLHDL 23.4 09/20/2017       CBC - LAST 2  Lab Results   Component Value Date    WBC 8.84 10/30/2023     WBC 10.98 09/06/2018    RBC 4.88 10/30/2023    RBC 4.88 09/06/2018    HGB 14.9 10/30/2023    HGB 14.9 09/06/2018    HCT 44.7 10/30/2023    HCT 45.8 09/06/2018    MCV 92 10/30/2023    MCV 94 09/06/2018    MCH 30.5 10/30/2023    MCH 30.5 09/06/2018    MCHC 33.3 10/30/2023    MCHC 32.5 09/06/2018    RDW 13.7 10/30/2023    RDW 13.1 09/06/2018     (L) 10/30/2023     09/06/2018    MPV 11.5 10/30/2023    MPV 12.5 09/06/2018    GRAN 6.0 09/06/2018    GRAN 54.9 09/06/2018    LYMPH 3.2 09/06/2018    LYMPH 29.3 09/06/2018    MONO 0.8 09/06/2018    MONO 7.7 09/06/2018    BASO 0.10 09/06/2018    BASO 0.09 06/06/2018    NRBC 0 09/06/2018    NRBC 0 06/06/2018       CHEMISTRY & LIVER FUNCTION - LAST 2  Lab Results   Component Value Date     10/30/2023     06/06/2018    K 3.8 10/30/2023    K 4.1 06/06/2018     10/30/2023     06/06/2018    CO2 27 10/30/2023    CO2 26 06/06/2018    ANIONGAP 5 (L) 10/30/2023    ANIONGAP 8 06/06/2018    BUN 14 10/30/2023    BUN 19 06/06/2018    CREATININE 1.1 10/30/2023    CREATININE 0.9 06/06/2018    GLU 77 10/30/2023    GLU 70 06/06/2018    CALCIUM 9.4 10/30/2023    CALCIUM 9.8 06/06/2018    ALBUMIN 3.9 06/06/2018    ALBUMIN 3.7 05/16/2018    PROT 6.8 06/06/2018    PROT 6.7 05/16/2018    ALKPHOS 67 06/06/2018    ALKPHOS 64 05/16/2018    ALT 8 (L) 06/06/2018    ALT 9 (L) 05/16/2018    AST 17 06/06/2018    AST 17 05/16/2018    BILITOT 0.4 06/06/2018    BILITOT 0.6 05/16/2018        CARDIAC PROFILE - LAST 2  Lab Results   Component Value Date    BNP 13 09/20/2017    CPK 75 09/20/2017    TROPONINI 0.013 05/16/2018    TROPONINI <0.006 09/20/2017        COAGULATION - LAST 2  Lab Results   Component Value Date    INR 0.9 10/30/2023    INR 1.0 09/20/2017    APTT 27.9 10/30/2023       ENDOCRINE & PSA - LAST 2  Lab Results   Component Value Date    HGBA1C 5.9 08/24/2021    HGBA1C 5.0 06/06/2018    TSH 1.093 06/06/2018    TSH 1.391 09/20/2017        ECHOCARDIOGRAM  RESULTS  No results found for this or any previous visit.      CURRENT/PREVIOUS VISIT EKG  Results for orders placed or performed during the hospital encounter of 10/30/23   EKG 12-lead    Collection Time: 10/30/23 11:46 AM    Narrative    Test Reason : Z01.810,    Vent. Rate : 057 BPM     Atrial Rate : 057 BPM     P-R Int : 152 ms          QRS Dur : 082 ms      QT Int : 464 ms       P-R-T Axes : 079 074 073 degrees     QTc Int : 451 ms    Sinus bradycardia  Nonspecific ST abnormality  Abnormal ECG  When compared with ECG of 11-SEP-2023 10:17,  Sinus rhythm is no longer with 2nd degree A-V block  Nonspecific T wave abnormality no longer evident in Inferior leads  Confirmed by Alcides PHILIPPE, Luis Alfredo BOURGEOIS ([id number]) on 11/5/2023 2:03:23 PM    Referred By:             Confirmed By:Luis Alfredo Mcgrath MD     No valid procedures specified.   No results found for this or any previous visit.    No valid procedures specified.        Assessment:       1. Essential hypertension    2. S/p TAVR (transcatheter aortic valve replacement), bioprosthetic    3. Peripheral arterial disease    4. Paroxysmal atrial fibrillation    5. Dyslipidemia           Plan:       Essential hypertension  -     losartan (COZAAR) 25 MG tablet; Take 1 tablet (25 mg total) by mouth once daily.  Dispense: 90 tablet; Refill: 3    S/p TAVR (transcatheter aortic valve replacement), bioprosthetic  -     IN OFFICE EKG 12-LEAD (to Muse)    Peripheral arterial disease  -     IN OFFICE EKG 12-LEAD (to Muse)    Paroxysmal atrial fibrillation  -     IN OFFICE EKG 12-LEAD (to Muse)  -     rivaroxaban (XARELTO) 20 mg Tab; Take 1 tablet (20 mg total) by mouth daily with dinner or evening meal.  Dispense: 60 tablet; Refill: 0  -     30 Day Outpatient Telemetry; Future    Dyslipidemia  -     Lipid Panel; Future; Expected date: 04/05/2024    Unsure if patient truly had paroxysmal atrial fibrillation.  Will get records from [place] for review.  Will give 60 day supply of Xarelto as  patient reports this is more financially feasible than Eliquis.  Will add losartan for blood pressure control.  Gain will need to review records from [place] regarding the patient's echocardiogram since they had AFib, if no echo records, will need to get an echo for own.  Follow up with Dr. Welch for peripheral arterial disease.  Get a lipid panel.    Follow up in about 6 weeks (around 5/17/2024) for f/u records from [place] - echo/ afib records.          MD [facility]

## 2024-04-12 ENCOUNTER — HOSPITAL ENCOUNTER (OUTPATIENT)
Dept: CARDIOLOGY | Facility: CLINIC | Age: 69
Discharge: HOME | End: 2024-04-12
Attending: INTERNAL MEDICINE
Payer: MEDICARE

## 2024-04-12 DIAGNOSIS — I48.0 PAROXYSMAL ATRIAL FIBRILLATION: ICD-10-CM

## 2024-04-12 PROCEDURE — 93228 REMOTE 30 DAY ECG REV/REPORT: CPT | Mod: ,,, | Performed by: INTERNAL MEDICINE

## 2024-04-17 DIAGNOSIS — J40 BRONCHITIS: ICD-10-CM

## 2024-04-18 ENCOUNTER — TELEPHONE (OUTPATIENT)
Dept: SMOKING CESSATION | Facility: CLINIC | Age: 69
End: 2024-04-18
Payer: MEDICARE

## 2024-04-18 RX ORDER — ALBUTEROL SULFATE 90 UG/1
2 AEROSOL, METERED RESPIRATORY (INHALATION) EVERY 6 HOURS PRN
Qty: 18 G | Refills: 0 | Status: ON HOLD | OUTPATIENT
Start: 2024-04-18 | End: 2024-06-11

## 2024-04-18 NOTE — TELEPHONE ENCOUNTER
Called patient to follow-up on missed appointment. Patient reports that she is having trouble with her telephone at this time and would like to call back and reschedule once she gets it fixed.

## 2024-04-27 ENCOUNTER — OFFICE VISIT (OUTPATIENT)
Dept: URGENT CARE | Facility: CLINIC | Age: 69
End: 2024-04-27
Payer: OTHER GOVERNMENT

## 2024-04-27 VITALS
WEIGHT: 139 LBS | OXYGEN SATURATION: 93 % | TEMPERATURE: 98 F | BODY MASS INDEX: 25.58 KG/M2 | HEIGHT: 62 IN | SYSTOLIC BLOOD PRESSURE: 172 MMHG | DIASTOLIC BLOOD PRESSURE: 94 MMHG | HEART RATE: 68 BPM | RESPIRATION RATE: 20 BRPM

## 2024-04-27 DIAGNOSIS — J44.1 COPD WITH ACUTE EXACERBATION: ICD-10-CM

## 2024-04-27 DIAGNOSIS — J01.40 ACUTE NON-RECURRENT PANSINUSITIS: ICD-10-CM

## 2024-04-27 DIAGNOSIS — R05.9 COUGH, UNSPECIFIED TYPE: Primary | ICD-10-CM

## 2024-04-27 DIAGNOSIS — H66.93 ACUTE OTITIS MEDIA, BILATERAL: ICD-10-CM

## 2024-04-27 PROCEDURE — 96372 THER/PROPH/DIAG INJ SC/IM: CPT | Mod: S$GLB,,, | Performed by: STUDENT IN AN ORGANIZED HEALTH CARE EDUCATION/TRAINING PROGRAM

## 2024-04-27 PROCEDURE — 99214 OFFICE O/P EST MOD 30 MIN: CPT | Mod: 25,S$GLB,, | Performed by: STUDENT IN AN ORGANIZED HEALTH CARE EDUCATION/TRAINING PROGRAM

## 2024-04-27 PROCEDURE — 94640 AIRWAY INHALATION TREATMENT: CPT | Mod: S$GLB,,, | Performed by: STUDENT IN AN ORGANIZED HEALTH CARE EDUCATION/TRAINING PROGRAM

## 2024-04-27 RX ORDER — DEXAMETHASONE SODIUM PHOSPHATE 4 MG/ML
8 INJECTION, SOLUTION INTRA-ARTICULAR; INTRALESIONAL; INTRAMUSCULAR; INTRAVENOUS; SOFT TISSUE
Status: COMPLETED | OUTPATIENT
Start: 2024-04-27 | End: 2024-04-27

## 2024-04-27 RX ORDER — PREDNISONE 20 MG/1
40 TABLET ORAL DAILY
Qty: 10 TABLET | Refills: 0 | Status: SHIPPED | OUTPATIENT
Start: 2024-04-28 | End: 2024-05-03

## 2024-04-27 RX ORDER — IPRATROPIUM BROMIDE 0.5 MG/2.5ML
0.5 SOLUTION RESPIRATORY (INHALATION)
Status: COMPLETED | OUTPATIENT
Start: 2024-04-27 | End: 2024-04-27

## 2024-04-27 RX ORDER — ALBUTEROL SULFATE 0.83 MG/ML
2.5 SOLUTION RESPIRATORY (INHALATION)
Status: COMPLETED | OUTPATIENT
Start: 2024-04-27 | End: 2024-04-27

## 2024-04-27 RX ORDER — DOXYCYCLINE HYCLATE 100 MG
100 TABLET ORAL 2 TIMES DAILY
Qty: 20 TABLET | Refills: 0 | Status: SHIPPED | OUTPATIENT
Start: 2024-04-27 | End: 2024-05-07

## 2024-04-27 RX ORDER — CHLOPHEDIANOL HCL AND PYRILAMINE MALEATE 12.5; 12.5 MG/5ML; MG/5ML
5 SOLUTION ORAL
Qty: 118 ML | Refills: 0 | Status: ON HOLD | OUTPATIENT
Start: 2024-04-27 | End: 2024-06-07 | Stop reason: ALTCHOICE

## 2024-04-27 RX ADMIN — DEXAMETHASONE SODIUM PHOSPHATE 8 MG: 4 INJECTION, SOLUTION INTRA-ARTICULAR; INTRALESIONAL; INTRAMUSCULAR; INTRAVENOUS; SOFT TISSUE at 10:04

## 2024-04-27 RX ADMIN — ALBUTEROL SULFATE 2.5 MG: 0.83 SOLUTION RESPIRATORY (INHALATION) at 10:04

## 2024-04-27 RX ADMIN — IPRATROPIUM BROMIDE 0.5 MG: 0.5 SOLUTION RESPIRATORY (INHALATION) at 10:04

## 2024-04-27 NOTE — PROGRESS NOTES
"Subjective:      Patient ID: Elissa Raymond is a 69 y.o. female.    Vitals:  height is 5' 2" (1.575 m) and weight is 63 kg (139 lb). Her oral temperature is 98 °F (36.7 °C). Her blood pressure is 172/94 (abnormal) and her pulse is 68. Her respiration is 20 and oxygen saturation is 93% (abnormal).     Chief Complaint: Cough    Patient is a 69-year-old female with a past medical history of COPD, GERD, hypertension, hyperlipidemia, TIA, and status post TAVR who presents to clinic for evaluation of cough and congestion.  Patient reports symptoms for about 2 weeks now.  Patient reports she has not vaccinated.  Patient reports positive sick exposure as her  brought this home from work.  Patient reports over-the-counter medications with no significant relief to symptoms.    Cough  This is a new problem. The current episode started 1 to 4 weeks ago (14 days). The problem has been gradually worsening. The problem occurs constantly. The cough is Productive of sputum. Associated symptoms include ear pain, headaches, postnasal drip and a sore throat. Pertinent negatives include no chest pain, chills, fever, myalgias, rash, shortness of breath or wheezing. The symptoms are aggravated by lying down. She has tried ipratropium inhaler and prescription cough suppressant (flonase) for the symptoms. The treatment provided no relief.       Constitution: Positive for fatigue. Negative for chills, sweating and fever.   HENT:  Positive for ear pain, hearing loss (Reports chronic hard of hearing), congestion, postnasal drip, sinus pressure and sore throat. Negative for ear discharge, tinnitus and drooling.    Neck: neck negative.   Cardiovascular: Negative.  Negative for chest pain and palpitations.   Eyes: Negative.    Respiratory:  Positive for cough, sputum production and COPD (History). Negative for chest tightness, shortness of breath and wheezing.    Gastrointestinal: Negative.  Negative for abdominal pain, nausea, vomiting " and diarrhea.   Endocrine: negative.   Genitourinary: Negative.  Negative for dysuria, frequency and urgency.   Musculoskeletal: Negative.  Negative for muscle ache.   Skin: Negative.  Negative for color change, pale, rash and erythema.   Allergic/Immunologic: Negative.    Neurological:  Positive for headaches. Negative for dizziness, light-headedness, passing out, disorientation and altered mental status.   Hematologic/Lymphatic: Negative.    Psychiatric/Behavioral: Negative.  Negative for altered mental status, disorientation and confusion.       Objective:     Physical Exam   Constitutional: She is oriented to person, place, and time. She appears well-developed. She is cooperative.  Non-toxic appearance. She appears ill. No distress.   HENT:   Head: Normocephalic and atraumatic.   Ears:   Right Ear: External ear and ear canal normal. No no drainage, swelling or tenderness. Tympanic membrane is erythematous and bulging. Tympanic membrane is not perforated. Decreased hearing is noted.   Left Ear: External ear and ear canal normal. No no drainage, swelling or tenderness. Tympanic membrane is erythematous and bulging. Tympanic membrane is not perforated. Decreased hearing is noted.   Nose: Congestion present. No mucosal edema, rhinorrhea or nasal deformity. No epistaxis. Right sinus exhibits maxillary sinus tenderness and frontal sinus tenderness. Left sinus exhibits maxillary sinus tenderness and frontal sinus tenderness.   Mouth/Throat: Uvula is midline and mucous membranes are normal. Mucous membranes are moist. No trismus in the jaw. Normal dentition. No uvula swelling. Posterior oropharyngeal erythema present. No oropharyngeal exudate. Oropharynx is clear.   Eyes: Conjunctivae and lids are normal. Pupils are equal, round, and reactive to light. Right eye exhibits no discharge. Left eye exhibits no discharge. No scleral icterus.   Neck: Trachea normal and phonation normal. Neck supple. No neck rigidity present.    Cardiovascular: Normal rate, regular rhythm and normal pulses.   Murmur heard.  Pulmonary/Chest: Effort normal. No respiratory distress. She has decreased breath sounds. She has no wheezes. She has rhonchi in the right upper field and the left upper field. She has no rales.   Abdominal: Normal appearance and bowel sounds are normal. She exhibits no distension. Soft. There is no abdominal tenderness.   Musculoskeletal: Normal range of motion.         General: Normal range of motion.      Cervical back: She exhibits no tenderness.   Lymphadenopathy:     She has no cervical adenopathy.   Neurological: She is alert and oriented to person, place, and time. She exhibits normal muscle tone.   Skin: Skin is warm, dry, intact, not diaphoretic, not pale and no rash. Capillary refill takes 2 to 3 seconds. No erythema   Psychiatric: Her speech is normal and behavior is normal. Judgment and thought content normal.   Nursing note and vitals reviewed.      Assessment:     1. Cough, unspecified type    2. Acute otitis media, bilateral    3. Acute non-recurrent pansinusitis    4. COPD with acute exacerbation        Plan:       Cough, unspecified type  -     XR CHEST PA AND LATERAL; Future; Expected date: 04/27/2024    Acute otitis media, bilateral    Acute non-recurrent pansinusitis    COPD with acute exacerbation    Other orders  -     albuterol nebulizer solution 2.5 mg  -     ipratropium 0.02 % nebulizer solution 0.5 mg  -     dexAMETHasone injection 8 mg  -     doxycycline (VIBRA-TABS) 100 MG tablet; Take 1 tablet (100 mg total) by mouth 2 (two) times daily. for 10 days  Dispense: 20 tablet; Refill: 0  -     predniSONE (DELTASONE) 20 MG tablet; Take 2 tablets (40 mg total) by mouth once daily. for 5 days  Dispense: 10 tablet; Refill: 0  -     pyrilamine-chlophedianoL (NINJACOF) 12.5-12.5 mg/5 mL Liqd; Take 5 mLs by mouth every 6 to 8 hours as needed (Cough).  Dispense: 118 mL; Refill: 0             Additional MDM:     Heart  Failure Score:   COPD = Yes (History)        Labs:  Patient refused point of care test.    Chest x-ray:  Cardiomediastinal silhouette within normal limits, status post TAVR. Lungs well inflated and clear. Mild biapical pleural thickening is stable. There is no pleural effusion although there is a suggestion of minimal posterior pleural thickening, unchanged. No acute bony abnormality. Battery device projects over left chest wall.   Decadron 8 mg IM in clinic.  Patient tolerated well.  No complications noted.    DuoNeb administered in clinic.  Patient tolerated well.  No complications noted.    Reassessment reveals OR 72, SPO2 95% RA, RR 20.  Mildly improved aeration with auscultation of lungs.  Able speak in full complete sentences.  Rhonchi seems improved with deep cough.  Reports feeling better after treatment.  Take medications as prescribed.  Continue previously prescribed nebulized treatments and inhalers as directed.  Tylenol/Motrin per package instructions for any pain or fever.    Assure adequate hydration.    Follow-up with PCP in 1-2 days.    Return to clinic as needed.    To ED for any new or acutely worsening symptoms.    Patient in agreement with plan of care.    DISCLAIMER: Please note that my documentation in this Electronic Healthcare Record was produced using speech recognition software and therefore may contain errors related to that software system.These could include grammar, punctuation and spelling errors or the inclusion/exclusion of phrases that were not intended. Garbled syntax, mangled pronouns, and other bizarre constructions may be attributed to that software system.

## 2024-05-06 LAB
OHS QRS DURATION: 82 MS
OHS QTC CALCULATION: 475 MS

## 2024-05-07 ENCOUNTER — TELEPHONE (OUTPATIENT)
Dept: CARDIOLOGY | Facility: CLINIC | Age: 69
End: 2024-05-07
Payer: MEDICARE

## 2024-05-07 NOTE — TELEPHONE ENCOUNTER
Tried to call patient to let them know its ok if monitor fell off to go ahead and sent it back via ups and send back.

## 2024-05-07 NOTE — TELEPHONE ENCOUNTER
----- Message from Emily Martin MA sent at 5/7/2024  9:22 AM CDT -----  Regarding: FW: return call  Contact: Raúl/ Kierra  Please advise ?  ----- Message -----  From: Alex Wallace  Sent: 5/7/2024   9:16 AM CDT  To: Roshan Garcia Staff  Subject: return call                                      Type:  Patient Returning Call    Who Called:Kierra with Raú[facility]  Who Left Message for Patient:office nurse  Does the patient know what this is regarding?:patient VO monitor fell off early with 9 out of the 14 days  Would the patient rather a call back or a response via [facility]?   Best Call Back Number:[phone]  Additional Information:

## 2024-05-24 ENCOUNTER — TELEPHONE (OUTPATIENT)
Dept: CARDIOLOGY | Facility: CLINIC | Age: 69
End: 2024-05-24

## 2024-05-24 ENCOUNTER — OFFICE VISIT (OUTPATIENT)
Dept: CARDIOLOGY | Facility: CLINIC | Age: 69
End: 2024-05-24
Payer: MEDICARE

## 2024-05-24 VITALS
DIASTOLIC BLOOD PRESSURE: 82 MMHG | BODY MASS INDEX: 24.75 KG/M2 | HEART RATE: 70 BPM | HEIGHT: 62 IN | SYSTOLIC BLOOD PRESSURE: 134 MMHG | OXYGEN SATURATION: 96 % | WEIGHT: 134.5 LBS

## 2024-05-24 DIAGNOSIS — I63.40 CEREBROVASCULAR ACCIDENT (CVA) DUE TO EMBOLISM OF CEREBRAL ARTERY: Primary | ICD-10-CM

## 2024-05-24 DIAGNOSIS — Z95.3 S/P TAVR (TRANSCATHETER AORTIC VALVE REPLACEMENT), BIOPROSTHETIC: ICD-10-CM

## 2024-05-24 LAB
OHS CV EVENT MONITOR DAY: 22
OHS CV HOLTER HOOKUP DATE: NORMAL
OHS CV HOLTER HOOKUP TIME: NORMAL
OHS CV HOLTER LENGTH DECIMAL HOURS: 536
OHS CV HOLTER LENGTH HOURS: 8
OHS CV HOLTER LENGTH MINUTES: 0
OHS CV HOLTER SCAN DATE: NORMAL
OHS CV HOLTER SINUS AVERAGE HR: 71 BPM
OHS CV HOLTER SINUS MAX HR: 160 BPM
OHS CV HOLTER SINUS MIN HR: 48 BPM
OHS CV HOLTER STUDY END DATE: NORMAL
OHS CV HOLTER STUDY END TIME: NORMAL

## 2024-05-24 PROCEDURE — 99214 OFFICE O/P EST MOD 30 MIN: CPT | Mod: S$GLB,,, | Performed by: INTERNAL MEDICINE

## 2024-05-24 PROCEDURE — 1101F PT FALLS ASSESS-DOCD LE1/YR: CPT | Mod: CPTII,S$GLB,, | Performed by: INTERNAL MEDICINE

## 2024-05-24 PROCEDURE — 99999 PR PBB SHADOW E&M-EST. PATIENT-LVL IV: CPT | Mod: PBBFAC,,, | Performed by: INTERNAL MEDICINE

## 2024-05-24 PROCEDURE — 1126F AMNT PAIN NOTED NONE PRSNT: CPT | Mod: CPTII,S$GLB,, | Performed by: INTERNAL MEDICINE

## 2024-05-24 PROCEDURE — 4010F ACE/ARB THERAPY RXD/TAKEN: CPT | Mod: CPTII,S$GLB,, | Performed by: INTERNAL MEDICINE

## 2024-05-24 PROCEDURE — 3288F FALL RISK ASSESSMENT DOCD: CPT | Mod: CPTII,S$GLB,, | Performed by: INTERNAL MEDICINE

## 2024-05-24 PROCEDURE — 3079F DIAST BP 80-89 MM HG: CPT | Mod: CPTII,S$GLB,, | Performed by: INTERNAL MEDICINE

## 2024-05-24 PROCEDURE — 3008F BODY MASS INDEX DOCD: CPT | Mod: CPTII,S$GLB,, | Performed by: INTERNAL MEDICINE

## 2024-05-24 PROCEDURE — 1159F MED LIST DOCD IN RCRD: CPT | Mod: CPTII,S$GLB,, | Performed by: INTERNAL MEDICINE

## 2024-05-24 PROCEDURE — 3075F SYST BP GE 130 - 139MM HG: CPT | Mod: CPTII,S$GLB,, | Performed by: INTERNAL MEDICINE

## 2024-05-24 NOTE — PROGRESS NOTES
[facility]    Subjective:     Patient ID:  Elissa Raymond is a 69 y.o. female patient here for evaluation Results (Cardiac monitor and Lipid panel )      HPI:  69-year-old female here for follow-up.  Patient here for follow-up of her embolic stroke.  Had difficulty filling her noble anticoagulated due to issues with cost.  Wants us to prescribe Xarelto to her via pharmacy.  She was diagnosed with embolic stroke.  We obtained records from [facility] regarding her stroke.  As per the notes, stroke was highly suspicious of being embolic in nature and was recommended by Neurology the patient to be on aspirin as well as an anticoagulant.  Further in the notes it was mentioned that patient will follow-up with neurology for further titration of the medications.  Patient reports she has seen Neurology but was not informed on what to do with her anticoagulation.  Patient had a monitor done with no evidence of atrial fibrillation on it.    Review of Systems   All other systems reviewed and are negative.       Past Medical History:   Diagnosis Date    Hyperlipidemia     Hypertension     Stroke     x 3    Tobacco use 11/02/2017    Unspecified macular degeneration        Past Surgical History:   Procedure Laterality Date    ANGIOGRAPHY OF LOWER EXTREMITY N/A 11/1/2023    Procedure: ANGIOGRAM BILATERLA LOWER;  Surgeon: Martin Hair MD;  Location: [facility] CATH/EP LAB;  Service: General;  Laterality: N/A;    AORTOGRAPHY WITH EXTREMITY RUNOFF N/A 11/1/2023    Procedure: AORTOGRAM, WITH EXTREMITY RUNOFF;  Surgeon: Martin Hair MD;  Location: [facility] CATH/EP LAB;  Service: General;  Laterality: N/A;    BLADDER SURGERY      CATARACT EXTRACTION      CHOLECYSTECTOMY      HYSTERECTOMY      KNEE SURGERY Left     TONSILLECTOMY         Family History   Problem Relation Name Age of Onset    Diabetes Mother      Kidney disease Mother      Glaucoma Mother      Macular  degeneration Mother      Cancer Father      Glaucoma Sister      Macular degeneration Sister      Macular degeneration Brother         Social History     Socioeconomic History    Marital status:    Tobacco Use    Smoking status: Every Day     Current packs/day: 1.00     Average packs/day: 1 pack/day for 46.0 years (46.0 ttl pk-yrs)     Types: Cigarettes    Smokeless tobacco: Never   Substance and Sexual Activity    Alcohol use: Yes     Comment: once a month or less    Drug use: No    Sexual activity: Yes     Partners: Male     Birth control/protection: None   Social History Narrative    6/6/18: lives with Arpit, also a patient of mine. There are two dogs at home. She is the only smoker at home. She has two kids, they are is [place].      Social Determinants of Health      Received from [facility]       Current Outpatient Medications   Medication Sig Dispense Refill    albuterol (PROVENTIL/VENTOLIN HFA) 90 mcg/actuation inhaler INHALE 2 PUFFS INTO THE LUNGS EVERY 6 HOURS AS NEEDED FOR WHEEZING 18 g 0    albuterol-ipratropium (DUO-NEB) 2.5 mg-0.5 mg/3 mL nebulizer solution Take 3 mLs by nebulization every 6 (six) hours as needed for Wheezing. Rescue 75 mL 3    ascorbic acid, vitamin C, (VITAMIN C) 250 MG tablet Take 1 tablet by mouth once daily.      aspirin (ECOTRIN) 81 MG EC tablet Take 1 tablet (81 mg total) by mouth once daily. (Patient taking differently: Take 81 mg by mouth 2 (two) times a day.) 90 tablet 3    atorvastatin (LIPITOR) 40 MG tablet Take 1 tablet (40 mg total) by mouth once daily. 90 tablet 0    budesonide-formoterol 160-4.5 mcg (SYMBICORT) 160-4.5 mcg/actuation HFAA Inhale 2 puffs into the lungs 2 (two) times daily.      CAPSICUM, CAYENNE, ORAL Take 1 capsule by mouth Daily.      cholecalciferol, vitamin D3, 1,250 mcg (50,000 unit) capsule Take 50,000 Units by mouth.      cinnamon bark (CINNAMON) 500 mg capsule Take 500 mg by mouth once daily.       ergocalciferol (ERGOCALCIFEROL) 50,000 unit Cap Take 1 capsule (50,000 Units total) by mouth every 7 days. 12 capsule 0    ezetimibe (ZETIA) 10 mg tablet Take 1 tablet (10 mg total) by mouth once daily. 90 tablet 3    ginger root (GINGER EXTRACT ORAL) Take 1 capsule by mouth once daily.      losartan (COZAAR) 25 MG tablet Take 1 tablet (25 mg total) by mouth once daily. 90 tablet 3    OXYGEN-AIR DELIVERY SYSTEMS MISC by [facility].(Non-Drug; Combo Route) route. 3 L by nasal route      pyrilamine-chlophedianoL (NINJACOF) 12.5-12.5 mg/5 mL Liqd Take 5 mLs by mouth every 6 to 8 hours as needed (Cough). 118 mL 0    rivaroxaban (XARELTO) 20 mg Tab Take 1 tablet (20 mg total) by mouth daily with dinner or evening meal. 60 tablet 0     No current facility-administered medications for this visit.       Review of patient's allergies indicates:   Allergen Reactions    Iodinated contrast media Shortness Of Breath    Pcn [penicillins] Other (See Comments)     Unable to obtain         Objective:        Vitals:    05/24/24 1037   BP: 134/82   Pulse: 70       Physical Exam  Vitals reviewed.   Constitutional:       Appearance: Normal appearance.   HENT:      Mouth/Throat:      Mouth: Mucous membranes are moist.   Eyes:      Pupils: Pupils are equal, round, and reactive to light.   Cardiovascular:      Rate and Rhythm: Normal rate and regular rhythm.      Pulses: Normal pulses.      Heart sounds: Normal heart sounds. No murmur heard.     No gallop.   Pulmonary:      Effort: Pulmonary effort is normal.      Breath sounds: Normal breath sounds.   Abdominal:      General: Bowel sounds are normal.      Palpations: Abdomen is soft.   Musculoskeletal:         General: Normal range of motion.   Skin:     General: Skin is warm and dry.   Neurological:      General: No focal deficit present.      Mental Status: She is alert and oriented to person, place, and time.   Psychiatric:         Mood and Affect: Mood normal.         LIPIDS - LAST 2   Lab  Results   Component Value Date    CHOL 220 (H) 04/05/2024    CHOL 219 (H) 06/06/2018    HDL 48 04/05/2024    HDL 55 06/06/2018    LDLCALC 150.4 04/05/2024    LDLCALC 140.8 06/06/2018    TRIG 108 04/05/2024    TRIG 116 06/06/2018    CHOLHDL 21.8 04/05/2024    CHOLHDL 25.1 06/06/2018       CBC - LAST 2  Lab Results   Component Value Date    WBC 8.84 10/30/2023    WBC 10.98 09/06/2018    RBC 4.88 10/30/2023    RBC 4.88 09/06/2018    HGB 14.9 10/30/2023    HGB 14.9 09/06/2018    HCT 44.7 10/30/2023    HCT 45.8 09/06/2018    MCV 92 10/30/2023    MCV 94 09/06/2018    MCH 30.5 10/30/2023    MCH 30.5 09/06/2018    MCHC 33.3 10/30/2023    MCHC 32.5 09/06/2018    RDW 13.7 10/30/2023    RDW 13.1 09/06/2018     (L) 10/30/2023     09/06/2018    MPV 11.5 10/30/2023    MPV 12.5 09/06/2018    GRAN 6.0 09/06/2018    GRAN 54.9 09/06/2018    LYMPH 3.2 09/06/2018    LYMPH 29.3 09/06/2018    MONO 0.8 09/06/2018    MONO 7.7 09/06/2018    BASO 0.10 09/06/2018    BASO 0.09 06/06/2018    NRBC 0 09/06/2018    NRBC 0 06/06/2018       CHEMISTRY & LIVER FUNCTION - LAST 2  Lab Results   Component Value Date     10/30/2023     06/06/2018    K 3.8 10/30/2023    K 4.1 06/06/2018     10/30/2023     06/06/2018    CO2 27 10/30/2023    CO2 26 06/06/2018    ANIONGAP 5 (L) 10/30/2023    ANIONGAP 8 06/06/2018    BUN 14 10/30/2023    BUN 19 06/06/2018    CREATININE 1.1 10/30/2023    CREATININE 0.9 06/06/2018    GLU 77 10/30/2023    GLU 70 06/06/2018    CALCIUM 9.4 10/30/2023    CALCIUM 9.8 06/06/2018    ALBUMIN 3.9 06/06/2018    ALBUMIN 3.7 05/16/2018    PROT 6.8 06/06/2018    PROT 6.7 05/16/2018    ALKPHOS 67 06/06/2018    ALKPHOS 64 05/16/2018    ALT 8 (L) 06/06/2018    ALT 9 (L) 05/16/2018    AST 17 06/06/2018    AST 17 05/16/2018    BILITOT 0.4 06/06/2018    BILITOT 0.6 05/16/2018        CARDIAC PROFILE - LAST 2  Lab Results   Component Value Date    BNP 13 09/20/2017    CPK 75 09/20/2017    TROPONINI 0.013  05/16/2018    TROPONINI <0.006 09/20/2017        COAGULATION - LAST 2  Lab Results   Component Value Date    INR 0.9 10/30/2023    INR 1.0 09/20/2017    APTT 27.9 10/30/2023       ENDOCRINE & PSA - LAST 2  Lab Results   Component Value Date    HGBA1C 5.9 08/24/2021    HGBA1C 5.0 06/06/2018    TSH 1.093 06/06/2018    TSH 1.391 09/20/2017        ECHOCARDIOGRAM RESULTS  No results found for this or any previous visit.      CURRENT/PREVIOUS VISIT EKG  Results for orders placed or performed in visit on 04/05/24   IN OFFICE EKG 12-LEAD (to [place])    Collection Time: 04/05/24 10:37 AM   Result Value Ref Range    QRS Duration 82 ms    OHS QTC Calculation 475 ms    Narrative    Test Reason : Z95.3,I73.9,I48.0,    Vent. Rate : 069 BPM     Atrial Rate : 069 BPM     P-R Int : 148 ms          QRS Dur : 082 ms      QT Int : 444 ms       P-R-T Axes : 060 077 -84 degrees     QTc Int : 475 ms    Normal sinus rhythm  ST and T wave abnormality, consider inferior ischemia  Prolonged QT  Abnormal ECG  When compared with ECG of 30-OCT-2023 11:46,  T wave inversion now evident in Inferior leads  T wave inversion more evident in Lateral leads  Confirmed by Alcides PHILIPPE, Luis Alfredo BOURGEOIS ([id number]) on 5/6/2024 9:25:03 PM    Referred By:             Confirmed By:Luis Alfredo Mcgrath MD     No valid procedures specified.   No results found for this or any previous visit.    No valid procedures specified.        Assessment:       1. Cerebrovascular accident (CVA) due to embolism of cerebral artery    2. S/p TAVR (transcatheter aortic valve replacement), bioprosthetic           Plan:       Cerebrovascular accident (CVA) due to embolism of cerebral artery  -     rivaroxaban (XARELTO) 20 mg Tab; Take 1 tablet (20 mg total) by mouth daily with dinner or evening meal.  Dispense: 60 tablet; Refill: 0    S/p TAVR (transcatheter aortic valve replacement), bioprosthetic    Give patient a 60 day supply of Xarelto for history of embolism.  Patient instructed that she needs  to reach out to Neurology as the discharge summary stated that these medications were supposed to be titrated by Neurology.  I can not clearly find a cardiac indication to prescribe her Xarelto for the moment.  Will get a loop recorder placed for the embolic stroke.    Follow up in about 8 weeks (around 7/19/2024) for f/u loop recorder.          MD Tamia [facility] [place]  [place]

## 2024-05-24 NOTE — PATIENT INSTRUCTIONS
Please inform your neurologist that we are planning to place recorder for the embolic stroke to monitor for Afib. Please find from neurology what the plan for xarelto is. If they would like you to continue it, then they have to provider further refills.

## 2024-05-28 ENCOUNTER — TELEPHONE (OUTPATIENT)
Dept: CARDIOLOGY | Facility: CLINIC | Age: 69
End: 2024-05-28
Payer: MEDICARE

## 2024-05-28 NOTE — TELEPHONE ENCOUNTER
----- Message from Diana Gregorio sent at 5/28/2024 10:51 AM CDT -----  Type: Needs Medical Advice  Who Called:  pt  Symptoms (please be specific):  pt said she need to speak to the nurse--said she just got the msg about her procedure w/ the provider and she need to know when, where and what time on 6/4--please call and advise   Best Call Back Number: [phone]    Additional Information: thank you

## 2024-05-29 ENCOUNTER — TELEPHONE (OUTPATIENT)
Dept: CARDIOLOGY | Facility: CLINIC | Age: 69
End: 2024-05-29
Payer: MEDICARE

## 2024-05-29 NOTE — TELEPHONE ENCOUNTER
----- Message from Trinidad Hutton sent at 5/29/2024  2:04 PM CDT -----  Regarding: Patient Returning Call  Contact: patient at [phone]  Type:  Patient Returning Call    Who Called:  patient at [phone]    Does the patient know what this is regarding?:  yes, procedure    Additional Information:  Needs details. Please call and advise. Thank you

## 2024-05-29 NOTE — TELEPHONE ENCOUNTER
She is asking if we can move her procedure to next Friday, her  is off of work and can bring her. I messaged scheduling and will call her back once I have a time.

## 2024-06-03 ENCOUNTER — OFFICE VISIT (OUTPATIENT)
Dept: URGENT CARE | Facility: CLINIC | Age: 69
End: 2024-06-03
Payer: MEDICARE

## 2024-06-03 VITALS
DIASTOLIC BLOOD PRESSURE: 84 MMHG | SYSTOLIC BLOOD PRESSURE: 121 MMHG | RESPIRATION RATE: 27 BRPM | WEIGHT: 134 LBS | HEIGHT: 62 IN | BODY MASS INDEX: 24.66 KG/M2 | HEART RATE: 96 BPM | OXYGEN SATURATION: 91 %

## 2024-06-03 DIAGNOSIS — J44.1 COPD WITH ACUTE EXACERBATION: ICD-10-CM

## 2024-06-03 DIAGNOSIS — R06.02 SOB (SHORTNESS OF BREATH): Primary | ICD-10-CM

## 2024-06-03 LAB
CTP QC/QA: YES
CTP QC/QA: YES
FLUAV AG NPH QL: NEGATIVE
FLUBV AG NPH QL: NEGATIVE
SARS-COV-2 AG RESP QL IA.RAPID: NEGATIVE

## 2024-06-03 PROCEDURE — 94640 AIRWAY INHALATION TREATMENT: CPT | Mod: 59,S$GLB,, | Performed by: STUDENT IN AN ORGANIZED HEALTH CARE EDUCATION/TRAINING PROGRAM

## 2024-06-03 PROCEDURE — 99214 OFFICE O/P EST MOD 30 MIN: CPT | Mod: S$GLB,,, | Performed by: STUDENT IN AN ORGANIZED HEALTH CARE EDUCATION/TRAINING PROGRAM

## 2024-06-03 PROCEDURE — 87811 SARS-COV-2 COVID19 W/OPTIC: CPT | Mod: QW,S$GLB,, | Performed by: STUDENT IN AN ORGANIZED HEALTH CARE EDUCATION/TRAINING PROGRAM

## 2024-06-03 PROCEDURE — 87804 INFLUENZA ASSAY W/OPTIC: CPT | Mod: 59,QW,, | Performed by: STUDENT IN AN ORGANIZED HEALTH CARE EDUCATION/TRAINING PROGRAM

## 2024-06-03 RX ORDER — IPRATROPIUM BROMIDE AND ALBUTEROL SULFATE 2.5; .5 MG/3ML; MG/3ML
3 SOLUTION RESPIRATORY (INHALATION)
Status: COMPLETED | OUTPATIENT
Start: 2024-06-03 | End: 2024-06-03

## 2024-06-03 RX ADMIN — IPRATROPIUM BROMIDE AND ALBUTEROL SULFATE 3 ML: 2.5; .5 SOLUTION RESPIRATORY (INHALATION) at 05:06

## 2024-06-03 NOTE — PROGRESS NOTES
"Subjective:      Patient ID: Elissa Raymond is a 69 y.o. female.    Vitals:  height is 5' 2" (1.575 m) and weight is 60.8 kg (134 lb). Her blood pressure is 121/84 and her pulse is 96. Her respiration is 27 (abnormal) and oxygen saturation is 91% (abnormal).     Chief Complaint: Hip Pain    Ambulatory to room with complaint of severe shortness of breath.  Was shopping with family when she became acutely short of breath.  Does state that she has been feeling bad and short of breath for the past 2 months.  Admits to a history of COPD for the past 5 years is not oxygen dependent.      Constitution: Negative.   HENT: Negative.     Neck: neck negative.   Cardiovascular: Negative.    Eyes: Negative.    Respiratory:  Positive for cough, COPD, shortness of breath and wheezing.    Gastrointestinal: Negative.    Genitourinary: Negative.    Musculoskeletal: Negative.    Skin: Negative.    Allergic/Immunologic: Negative.    Neurological: Negative.    Psychiatric/Behavioral: Negative.        Objective:     Physical Exam   Constitutional: She is oriented to person, place, and time. She appears well-developed. She is cooperative.  Non-toxic appearance. She does not appear ill. No distress.   HENT:   Head: Normocephalic and atraumatic.   Ears:   Right Ear: Hearing, tympanic membrane, external ear and ear canal normal.   Left Ear: Hearing, tympanic membrane, external ear and ear canal normal.   Nose: Nose normal. No mucosal edema, rhinorrhea or nasal deformity. No epistaxis. Right sinus exhibits no maxillary sinus tenderness and no frontal sinus tenderness. Left sinus exhibits no maxillary sinus tenderness and no frontal sinus tenderness.   Mouth/Throat: Uvula is midline, oropharynx is clear and moist and mucous membranes are normal. No trismus in the jaw. Normal dentition. No uvula swelling. No oropharyngeal exudate, posterior oropharyngeal edema or posterior oropharyngeal erythema.   Eyes: Conjunctivae and lids are normal. No " scleral icterus.   Neck: Trachea normal and phonation normal. Neck supple. No edema present. No erythema present. No neck rigidity present.   Cardiovascular: Normal rate, regular rhythm, normal heart sounds and normal pulses.   Pulmonary/Chest: No respiratory distress. She has no decreased breath sounds. She has wheezes. She has rhonchi.         Comments: Tachypneic.  Patient is in moderate respiratory distress.    Abdominal: Normal appearance.   Musculoskeletal: Normal range of motion.         General: No deformity. Normal range of motion.   Neurological: She is alert and oriented to person, place, and time. She exhibits normal muscle tone. Coordination normal.   Skin: Skin is warm, dry, intact, not diaphoretic and not pale.   Psychiatric: Her speech is normal and behavior is normal. Judgment and thought content normal.   Nursing note and vitals reviewed.      Assessment:     1. SOB (shortness of breath)    2. COPD with acute exacerbation        Plan:       SOB (shortness of breath)  -     SARS Coronavirus 2 Antigen, POCT Manual Read  -     POCT Influenza A/B Rapid Antigen    COPD with acute exacerbation             Additional MDM:     Heart Failure Score:   COPD = Yes      She would benefit from IV steroids, and higher level of care evaluation.  Recommended patient be transported to the emergency department.  DuoNeb treatment given in clinic.  Minimal relief.  Patient will be transported per EMS.  COVID flu negative in clinic.

## 2024-06-07 PROCEDURE — 0JH632Z INSERTION OF MONITORING DEVICE INTO CHEST SUBCUTANEOUS TISSUE AND FASCIA, PERCUTANEOUS APPROACH: ICD-10-PCS | Performed by: INTERNAL MEDICINE

## 2024-06-09 ENCOUNTER — HOSPITAL ENCOUNTER (INPATIENT)
Facility: HOSPITAL | Age: 69
LOS: 2 days | Discharge: HOME HEALTH | DRG: 066 | End: 2024-06-11
Attending: EMERGENCY MEDICINE | Admitting: INTERNAL MEDICINE
Payer: MEDICARE

## 2024-06-09 ENCOUNTER — NURSE TRIAGE (OUTPATIENT)
Dept: ADMINISTRATIVE | Facility: CLINIC | Age: 69
End: 2024-06-09
Payer: MEDICARE

## 2024-06-09 DIAGNOSIS — R29.818 ACUTE FOCAL NEUROLOGICAL DEFICIT: ICD-10-CM

## 2024-06-09 DIAGNOSIS — I63.9 ISCHEMIC CEREBROVASCULAR ACCIDENT (CVA): Primary | ICD-10-CM

## 2024-06-09 DIAGNOSIS — I63.9 STROKE: ICD-10-CM

## 2024-06-09 DIAGNOSIS — J40 BRONCHITIS: ICD-10-CM

## 2024-06-09 DIAGNOSIS — I63.40 CEREBROVASCULAR ACCIDENT (CVA) DUE TO EMBOLISM OF CEREBRAL ARTERY: ICD-10-CM

## 2024-06-09 PROBLEM — I48.91 A-FIB: Status: ACTIVE | Noted: 2024-06-09

## 2024-06-09 PROBLEM — D72.829 LEUKOCYTOSIS: Status: ACTIVE | Noted: 2024-06-09

## 2024-06-09 PROBLEM — I10 ACCELERATED HYPERTENSION: Status: ACTIVE | Noted: 2024-06-09

## 2024-06-09 LAB
ALBUMIN SERPL BCP-MCNC: 3.7 G/DL (ref 3.5–5.2)
ALP SERPL-CCNC: 50 U/L (ref 55–135)
ALT SERPL W/O P-5'-P-CCNC: 29 U/L (ref 10–44)
ANION GAP SERPL CALC-SCNC: 6 MMOL/L (ref 8–16)
AST SERPL-CCNC: 15 U/L (ref 10–40)
BASOPHILS # BLD AUTO: 0.03 K/UL (ref 0–0.2)
BASOPHILS NFR BLD: 0.2 % (ref 0–1.9)
BILIRUB SERPL-MCNC: 0.6 MG/DL (ref 0.1–1)
BILIRUB UR QL STRIP: NEGATIVE
BUN SERPL-MCNC: 21 MG/DL (ref 8–23)
CALCIUM SERPL-MCNC: 8.5 MG/DL (ref 8.7–10.5)
CHLORIDE SERPL-SCNC: 105 MMOL/L (ref 95–110)
CHOLEST SERPL-MCNC: 193 MG/DL (ref 120–199)
CHOLEST/HDLC SERPL: 3.1 {RATIO} (ref 2–5)
CLARITY UR: CLEAR
CO2 SERPL-SCNC: 25 MMOL/L (ref 23–29)
COLOR UR: COLORLESS
CREAT SERPL-MCNC: 1.1 MG/DL (ref 0.5–1.4)
CREAT SERPL-MCNC: 1.1 MG/DL (ref 0.5–1.4)
DIFFERENTIAL METHOD BLD: ABNORMAL
EOSINOPHIL # BLD AUTO: 0.4 K/UL (ref 0–0.5)
EOSINOPHIL NFR BLD: 2.8 % (ref 0–8)
ERYTHROCYTE [DISTWIDTH] IN BLOOD BY AUTOMATED COUNT: 13.9 % (ref 11.5–14.5)
EST. GFR  (NO RACE VARIABLE): 54.1 ML/MIN/1.73 M^2
GLUCOSE SERPL-MCNC: 126 MG/DL (ref 70–110)
GLUCOSE SERPL-MCNC: 76 MG/DL (ref 70–110)
GLUCOSE SERPL-MCNC: 78 MG/DL (ref 70–110)
GLUCOSE UR QL STRIP: NEGATIVE
HCT VFR BLD AUTO: 47 % (ref 37–48.5)
HDLC SERPL-MCNC: 62 MG/DL (ref 40–75)
HDLC SERPL: 32.1 % (ref 20–50)
HGB BLD-MCNC: 15.6 G/DL (ref 12–16)
HGB UR QL STRIP: NEGATIVE
IMM GRANULOCYTES # BLD AUTO: 0.17 K/UL (ref 0–0.04)
IMM GRANULOCYTES NFR BLD AUTO: 1.1 % (ref 0–0.5)
INR PPP: 1 (ref 0.8–1.2)
KETONES UR QL STRIP: NEGATIVE
LACTATE SERPL-SCNC: 0.6 MMOL/L (ref 0.5–1.9)
LDH SERPL L TO P-CCNC: 0.9 MMOL/L (ref 0.5–2.2)
LDLC SERPL CALC-MCNC: 108 MG/DL (ref 63–159)
LEUKOCYTE ESTERASE UR QL STRIP: NEGATIVE
LYMPHOCYTES # BLD AUTO: 3.9 K/UL (ref 1–4.8)
LYMPHOCYTES NFR BLD: 25.9 % (ref 18–48)
MCH RBC QN AUTO: 30.5 PG (ref 27–31)
MCHC RBC AUTO-ENTMCNC: 33.2 G/DL (ref 32–36)
MCV RBC AUTO: 92 FL (ref 82–98)
MONOCYTES # BLD AUTO: 1 K/UL (ref 0.3–1)
MONOCYTES NFR BLD: 6.9 % (ref 4–15)
NEUTROPHILS # BLD AUTO: 9.5 K/UL (ref 1.8–7.7)
NEUTROPHILS NFR BLD: 63.1 % (ref 38–73)
NITRITE UR QL STRIP: NEGATIVE
NONHDLC SERPL-MCNC: 131 MG/DL
NRBC BLD-RTO: 0 /100 WBC
PH UR STRIP: 8 [PH] (ref 5–8)
PLATELET # BLD AUTO: 134 K/UL (ref 150–450)
PMV BLD AUTO: 11.4 FL (ref 9.2–12.9)
POTASSIUM SERPL-SCNC: 3.9 MMOL/L (ref 3.5–5.1)
PROT SERPL-MCNC: 6 G/DL (ref 6–8.4)
PROT UR QL STRIP: NEGATIVE
PROTHROMBIN TIME: 10.7 SEC (ref 9–12.5)
RBC # BLD AUTO: 5.12 M/UL (ref 4–5.4)
SAMPLE: NORMAL
SAMPLE: NORMAL
SODIUM SERPL-SCNC: 136 MMOL/L (ref 136–145)
SP GR UR STRIP: >1.03 (ref 1–1.03)
TRIGL SERPL-MCNC: 115 MG/DL (ref 30–150)
TROPONIN I SERPL HS-MCNC: 8.3 PG/ML (ref 0–14.9)
TSH SERPL DL<=0.005 MIU/L-ACNC: 2.71 UIU/ML (ref 0.34–5.6)
URN SPEC COLLECT METH UR: ABNORMAL
UROBILINOGEN UR STRIP-ACNC: NEGATIVE EU/DL
WBC # BLD AUTO: 15 K/UL (ref 3.9–12.7)

## 2024-06-09 PROCEDURE — 94799 UNLISTED PULMONARY SVC/PX: CPT

## 2024-06-09 PROCEDURE — 25000242 PHARM REV CODE 250 ALT 637 W/ HCPCS: Performed by: HOSPITALIST

## 2024-06-09 PROCEDURE — 80053 COMPREHEN METABOLIC PANEL: CPT | Performed by: EMERGENCY MEDICINE

## 2024-06-09 PROCEDURE — 25000003 PHARM REV CODE 250: Performed by: HOSPITALIST

## 2024-06-09 PROCEDURE — 93005 ELECTROCARDIOGRAM TRACING: CPT | Performed by: GENERAL PRACTICE

## 2024-06-09 PROCEDURE — 84484 ASSAY OF TROPONIN QUANT: CPT | Performed by: EMERGENCY MEDICINE

## 2024-06-09 PROCEDURE — 80061 LIPID PANEL: CPT | Performed by: EMERGENCY MEDICINE

## 2024-06-09 PROCEDURE — G0378 HOSPITAL OBSERVATION PER HR: HCPCS

## 2024-06-09 PROCEDURE — U0002 COVID-19 LAB TEST NON-CDC: HCPCS | Performed by: HOSPITALIST

## 2024-06-09 PROCEDURE — 82565 ASSAY OF CREATININE: CPT

## 2024-06-09 PROCEDURE — 93010 ELECTROCARDIOGRAM REPORT: CPT | Mod: ,,, | Performed by: GENERAL PRACTICE

## 2024-06-09 PROCEDURE — 25500020 PHARM REV CODE 255: Performed by: EMERGENCY MEDICINE

## 2024-06-09 PROCEDURE — 96375 TX/PRO/DX INJ NEW DRUG ADDON: CPT

## 2024-06-09 PROCEDURE — 87502 INFLUENZA DNA AMP PROBE: CPT | Performed by: HOSPITALIST

## 2024-06-09 PROCEDURE — 85025 COMPLETE CBC W/AUTO DIFF WBC: CPT | Performed by: EMERGENCY MEDICINE

## 2024-06-09 PROCEDURE — 36415 COLL VENOUS BLD VENIPUNCTURE: CPT | Performed by: EMERGENCY MEDICINE

## 2024-06-09 PROCEDURE — 99900035 HC TECH TIME PER 15 MIN (STAT)

## 2024-06-09 PROCEDURE — 99285 EMERGENCY DEPT VISIT HI MDM: CPT | Mod: 25

## 2024-06-09 PROCEDURE — 87040 BLOOD CULTURE FOR BACTERIA: CPT | Mod: 59 | Performed by: EMERGENCY MEDICINE

## 2024-06-09 PROCEDURE — 94640 AIRWAY INHALATION TREATMENT: CPT | Mod: XB

## 2024-06-09 PROCEDURE — 96365 THER/PROPH/DIAG IV INF INIT: CPT

## 2024-06-09 PROCEDURE — 96372 THER/PROPH/DIAG INJ SC/IM: CPT | Performed by: HOSPITALIST

## 2024-06-09 PROCEDURE — 81003 URINALYSIS AUTO W/O SCOPE: CPT | Performed by: EMERGENCY MEDICINE

## 2024-06-09 PROCEDURE — 85610 PROTHROMBIN TIME: CPT | Performed by: EMERGENCY MEDICINE

## 2024-06-09 PROCEDURE — 25000003 PHARM REV CODE 250: Performed by: EMERGENCY MEDICINE

## 2024-06-09 PROCEDURE — 84443 ASSAY THYROID STIM HORMONE: CPT | Performed by: EMERGENCY MEDICINE

## 2024-06-09 PROCEDURE — 94761 N-INVAS EAR/PLS OXIMETRY MLT: CPT

## 2024-06-09 PROCEDURE — 82962 GLUCOSE BLOOD TEST: CPT

## 2024-06-09 PROCEDURE — 63600175 PHARM REV CODE 636 W HCPCS: Performed by: HOSPITALIST

## 2024-06-09 PROCEDURE — 83605 ASSAY OF LACTIC ACID: CPT | Performed by: EMERGENCY MEDICINE

## 2024-06-09 PROCEDURE — 63600175 PHARM REV CODE 636 W HCPCS: Performed by: EMERGENCY MEDICINE

## 2024-06-09 RX ORDER — EZETIMIBE 10 MG/1
10 TABLET ORAL DAILY
Status: DISCONTINUED | OUTPATIENT
Start: 2024-06-10 | End: 2024-06-11 | Stop reason: HOSPADM

## 2024-06-09 RX ORDER — DIPHENHYDRAMINE HYDROCHLORIDE 50 MG/ML
50 INJECTION INTRAMUSCULAR; INTRAVENOUS
Status: COMPLETED | OUTPATIENT
Start: 2024-06-09 | End: 2024-06-09

## 2024-06-09 RX ORDER — ASPIRIN 81 MG/1
81 TABLET ORAL DAILY
Status: DISCONTINUED | OUTPATIENT
Start: 2024-06-10 | End: 2024-06-11 | Stop reason: HOSPADM

## 2024-06-09 RX ORDER — ENOXAPARIN SODIUM 100 MG/ML
40 INJECTION SUBCUTANEOUS EVERY 24 HOURS
Status: DISCONTINUED | OUTPATIENT
Start: 2024-06-09 | End: 2024-06-11 | Stop reason: HOSPADM

## 2024-06-09 RX ORDER — SODIUM CHLORIDE 0.9 % (FLUSH) 0.9 %
10 SYRINGE (ML) INJECTION
Status: DISCONTINUED | OUTPATIENT
Start: 2024-06-09 | End: 2024-06-11 | Stop reason: HOSPADM

## 2024-06-09 RX ORDER — NAPROXEN SODIUM 220 MG/1
325 TABLET, FILM COATED ORAL ONCE
Status: COMPLETED | OUTPATIENT
Start: 2024-06-09 | End: 2024-06-09

## 2024-06-09 RX ORDER — ATORVASTATIN CALCIUM 40 MG/1
40 TABLET, FILM COATED ORAL NIGHTLY
Status: DISCONTINUED | OUTPATIENT
Start: 2024-06-09 | End: 2024-06-11 | Stop reason: HOSPADM

## 2024-06-09 RX ORDER — ARFORMOTEROL TARTRATE 15 UG/2ML
15 SOLUTION RESPIRATORY (INHALATION) 2 TIMES DAILY
Status: DISCONTINUED | OUTPATIENT
Start: 2024-06-09 | End: 2024-06-11 | Stop reason: HOSPADM

## 2024-06-09 RX ORDER — BISACODYL 10 MG/1
10 SUPPOSITORY RECTAL DAILY PRN
Status: DISCONTINUED | OUTPATIENT
Start: 2024-06-09 | End: 2024-06-11 | Stop reason: HOSPADM

## 2024-06-09 RX ORDER — BUDESONIDE 0.5 MG/2ML
0.5 INHALANT ORAL EVERY 12 HOURS
Status: DISCONTINUED | OUTPATIENT
Start: 2024-06-09 | End: 2024-06-11 | Stop reason: HOSPADM

## 2024-06-09 RX ORDER — LABETALOL HYDROCHLORIDE 5 MG/ML
10 INJECTION, SOLUTION INTRAVENOUS
Status: DISCONTINUED | OUTPATIENT
Start: 2024-06-09 | End: 2024-06-11 | Stop reason: HOSPADM

## 2024-06-09 RX ORDER — ATORVASTATIN CALCIUM 40 MG/1
40 TABLET, FILM COATED ORAL NIGHTLY
Status: DISCONTINUED | OUTPATIENT
Start: 2024-06-09 | End: 2024-06-09

## 2024-06-09 RX ORDER — BUDESONIDE AND FORMOTEROL FUMARATE DIHYDRATE 160; 4.5 UG/1; UG/1
2 AEROSOL RESPIRATORY (INHALATION) 2 TIMES DAILY
Status: DISCONTINUED | OUTPATIENT
Start: 2024-06-09 | End: 2024-06-09

## 2024-06-09 RX ADMIN — ASPIRIN 81 MG 324 MG: 81 TABLET ORAL at 06:06

## 2024-06-09 RX ADMIN — IOHEXOL 100 ML: 350 INJECTION, SOLUTION INTRAVENOUS at 11:06

## 2024-06-09 RX ADMIN — ENOXAPARIN SODIUM 40 MG: 40 INJECTION SUBCUTANEOUS at 06:06

## 2024-06-09 RX ADMIN — ATORVASTATIN CALCIUM 40 MG: 40 TABLET, FILM COATED ORAL at 08:06

## 2024-06-09 RX ADMIN — BUDESONIDE INHALATION 0.5 MG: 0.5 SUSPENSION RESPIRATORY (INHALATION) at 07:06

## 2024-06-09 RX ADMIN — ARFORMOTEROL TARTRATE 15 MCG: 15 SOLUTION RESPIRATORY (INHALATION) at 07:06

## 2024-06-09 RX ADMIN — CEFTRIAXONE SODIUM 1 G: 1 INJECTION, POWDER, FOR SOLUTION INTRAMUSCULAR; INTRAVENOUS at 03:06

## 2024-06-09 RX ADMIN — DIPHENHYDRAMINE HYDROCHLORIDE 50 MG: 50 INJECTION INTRAMUSCULAR; INTRAVENOUS at 11:06

## 2024-06-09 NOTE — ASSESSMENT & PLAN NOTE
Physical Therapy Visit    Referred by: Charlotte Marcial MD; Medical Diagnosis (from order):    Diagnosis Information      Diagnosis    729.89 (ICD-9-CM) - R29.898 (ICD-10-CM) - Decreased strength of trunk and back    278.01, V85.54 (ICD-9-CM) - E66.01, Z68.54 (ICD-10-CM) - Severe obesity due to excess calories with serious comorbidity and body mass index (BMI) greater than 99th percentile for age in pediatric patient (CMS/[facility])    272.2 (ICD-9-CM) - E78.2 (ICD-10-CM) - Hyperlipidemia, mixed              Visit: 27    Visit Type: Daily Treatment Note    SUBJECTIVE                                                                                                               Caregiver reviews that pt had 4 wisdom teeth and 4 other teeth removed last week.  Has been doing okay.  Not eating as much.  Dad checked weight and indicates further weight loss.    Was not as active with going to pool last week while recovering.  Went x 1 to outdoor pool but was not interested in staying long.      Pain / Symptoms:  Patient denies pain/symptoms    OBJECTIVE                                                                                                                        TREATMENT                                                                                                                  Aquatic Therapy in 92 degree pool:   Forward gait throughout pool during session, hand hold assist of PT to help pt continue to move at times    Jumping in place    Straddle noodle to challenge core stability; pt moving forward thru pool while on noodle, hopping forward and I place    Seated on stairs and bringing toes to surface 2 x 5    Seated on bench:  Knee extension / hamstring curl - together, attempted opposing but had difficulty following cues  Attempted Hip abduction / adduction, difficulty with following cues    Standing Shoulder horizontal abduction / adduction  Attempted push/pull with both hands on 1 paddle, pt unable to  Suspect reactive.  However will rule out infection.  Chest x-ray is negative.  Will order UA, COVID/flu.  Hold antibiotics for now.     follow cues    Monkey crawl along railing, pool edge to promote upper extremity activity    Stepping up onto and jumping off of step placed in pool    Throwing ball    Bowling activity at pool edge to promote reaching, use of upper extremities    Overhead reach    Skilled input: verbal instruction/cues and tactile instruction/cues       ASSESSMENT                                                                                                               - pt did well with walking and movement while straddling noodle in pool today  - attempted alternating seated exercise today as well as bilateral push/pull with paddle; pt with difficulty with following cues for these exercises    No pain behaviors evident before, during or immediately following today's session.  Pain/symptoms after session (out of 10): 0  Patient Education:   Results of above outlined education: Verbalizes understanding and Needs reinforcement      PLAN                                                                                                                           Suggestions for next session as indicated: Progress per plan of care  Consider attempt to increase to combination of land/aquatic therapy.    Land: Nu-step.  Develop and begin to issue lower extremity exercises that could be incorporated into a home exercise program.    Aquatic therapy: continue to incorporate activities to motivate pt to walk, reach, move in pool for general conditioning and core control.  Incorporate wonderboard or exercise on noodle for activation of core.             Therapy procedure time and total treatment time can be found documented on the Time Entry flowsheet

## 2024-06-09 NOTE — Clinical Note
Diagnosis: Ischemic cerebrovascular accident (CVA) [[id number]]   Future Attending Provider: TRAVIS PEREZ [[id number]]   Admit to which facility:: [facility] [[id number]]   Reason for IP Medical Treatment  (Clinical interventions that can only be accomplished in the IP setting? ) :: stroke   I certify that Inpatient services for greater than or equal to 2 midnights are medically necessary:: Yes   Plans for Post-Acute care--if anticipated (pick the single best option):: C. Discharge home with home health services

## 2024-06-09 NOTE — ASSESSMENT & PLAN NOTE
- Paroxysmal AFib.  Currently patient is in normal sinus.  - Patient was supposed to be on Xarelto, but apparently she does not have Xarelto at home, but rather Eliquis.  She refused to take Eliquis however as she read on the Internet that patients with valve replacement should not be taking Eliquis.  - Will consult Cardiology. Appreciate recs.  Admit to tele.  2D echo.  Status post a Holter monitor 2 days ago.  Appreciate cardiology assistance in Holter monitor interrogation.

## 2024-06-09 NOTE — ASSESSMENT & PLAN NOTE
- MRI brain showed Focal area of suspected diffusion restriction in rightward central chapincito is suspicious for focal ischemia.  - Stroke pathway.  Neuro consultation, 2D echo, admit to tele.  - PT/OT/speech.  - suspect stroke to be embolic secondary to her AFib, while not being on any anticoagulation.  Spoken to Neurology, who recommended to start anticoagulation 48 hours after stroke.   - patient will need Holter monitor interrogation.  - will give 325 mg of aspirin now, start 81 mg tomorrow.  - Lipitor q.h.s.. will order Lipid panel.

## 2024-06-09 NOTE — ED NOTES
06/09/24 1519   Gomez Pre-Screening   Is patient able to be positioned upright with some head control?   1 Yes   Gomez instructions Perform oral hygiene prior to Gomez screening   Gomez Bedside Swallowing Screen   Gomez pass/fail Pass (should be able to swallow diet safely)   1. Is Patient Alert? (can follow commands) 1 Yes   Gomez instructions Continue screen   2a. Dysarthria (speech slurred or garbled) 2 No   2b. Aphasia (trouble speaking or understanding words)  2 No   Speech instructions Continue screen   3a. Able to clench teeth 1 Yes   3b. Able to close lips 1 Yes   3c. Face is symmetrical with movement 1 Yes   3d. Tongue is midline 1 Yes   3e. Uvula is midline 1 Yes   Motor facial strength instructions Continue screen   4a. Gag reflex is present 1 Yes   4b. Has voluntary cough (have pt. cough 2 times) 1 Yes   4c. Able to swallow own secretions (no drooling) 1 Yes   4d. Swallow reflex is present 1 Yes   Gag reflex instructions Continue screen   TSP (calculated) 4   5. Give a teaspoon (5ml) of water   5a. Choked with swallowing 2 No   5b. Voice sounds gurgly 2 No   5c. Coughed after water 2 No   5d. Water dribbles out of mouth 2 No   Teaspoon of water Continue screen   60ml (calculated) 8   6. Give 60ml of water (if teaspoon was tolerated)    6a. Choked with swallowing 2 No   6b. Voice sounds gurgly 2 No   6c. Coughed after water 2 No   6d. Water dribbles out of mouth  2 No

## 2024-06-09 NOTE — H&P
[facility] - Emergency Dept  Hospital Medicine  History & Physical    Patient Name: Elissa Raymond  MRN: [id number]  Patient Class: IP- Inpatient  Admission Date: 6/9/2024  Attending Physician: Anny Sampson MD  Primary Care Provider: Scar Hussein MD         Patient information was obtained from patient, spouse/SO, and ER records.     Subjective:     Principal Problem:Stroke    Chief Complaint:   Chief Complaint   Patient presents with    Chest Pain    POST PROCEDURE PROBLEM     MONITOR INSERTED INTO HER CHEST ON FRIDAY.     Headache     ONSET YESTERDAY    Altered Mental Status     ONSET SAT        HPI: 69-year-old history of Afib currently not on any anticoagulation, prior stroke, and blind both eyes presented to the ER because of lethargy.  Patient recently had a Holter monitor placed 2 days ago on Friday for her h/o afib. Since then, she has been feeling weak and tired.  Apparently she went to sleep yesterday with no neuro deficits.  She woke up today feeling lethargic, and was slow to answer questions.   brought her to the ER for evaluation.    In the ER, vitals showed a blood pressure of 197/88, heart rate of 63, respiratory rate of 22, afebrile, satting 97% on room air.  CBC showed white count of 15. CMP showed her CKD stage 3.  Troponin, and TSH are within normal limits.  CT head negative for any acute intracranial process. CTA negative for large vessel occlusion. MRI brain showed Focal area of suspected diffusion restriction in rightward central chapincito is suspicious for focal ischemia. MRA brain is negative.  Neurology was consulted, patient will be admitted to Medicine further care.      Past Medical History:   Diagnosis Date    A-fib     Blind in both eyes     Hyperlipidemia     Hypertension     Stroke     x 3    Tobacco use 11/02/2017    Unspecified macular degeneration     Unspecified macular degeneration        Past Surgical History:   Procedure Laterality Date    ANGIOGRAPHY OF  LOWER EXTREMITY N/A 11/01/2023    Procedure: ANGIOGRAM BILATERLA LOWER;  Surgeon: Martin Hair MD;  Location: [facility] CATH/EP LAB;  Service: General;  Laterality: N/A;    AORTIC VALVE REPLACEMENT      AORTOGRAPHY WITH EXTREMITY RUNOFF N/A 11/01/2023    Procedure: AORTOGRAM, WITH EXTREMITY RUNOFF;  Surgeon: Martin Hair MD;  Location: [facility] CATH/EP LAB;  Service: General;  Laterality: N/A;    BLADDER SURGERY      BLADDER SURGERY      Lift.    CATARACT EXTRACTION      CHOLECYSTECTOMY      HYSTERECTOMY      KNEE SURGERY Left     TONSILLECTOMY         Review of patient's allergies indicates:   Allergen Reactions    Iodinated contrast media Shortness Of Breath    Pcn [penicillins] Other (See Comments)     Unable to obtain       No current facility-administered medications on file prior to encounter.     Current Outpatient Medications on File Prior to Encounter   Medication Sig    albuterol (PROVENTIL/VENTOLIN HFA) 90 mcg/actuation inhaler INHALE 2 PUFFS INTO THE LUNGS EVERY 6 HOURS AS NEEDED FOR WHEEZING    albuterol-ipratropium (DUO-NEB) 2.5 mg-0.5 mg/3 mL nebulizer solution Take 3 mLs by nebulization every 6 (six) hours as needed for Wheezing. Rescue    aspirin (ECOTRIN) 81 MG EC tablet Take 1 tablet (81 mg total) by mouth once daily.    atorvastatin (LIPITOR) 40 MG tablet Take 1 tablet (40 mg total) by mouth once daily.    budesonide-formoterol 160-4.5 mcg (SYMBICORT) 160-4.5 mcg/actuation HFAA Inhale 2 puffs into the lungs 2 (two) times daily.    CAPSICUM, CAYENNE, ORAL Take 1 capsule by mouth Daily.    cholecalciferol, vitamin D3, 1,250 mcg (50,000 unit) capsule Take 50,000 Units by mouth.    cinnamon bark (CINNAMON) 500 mg capsule Take 500 mg by mouth once daily.    ezetimibe (ZETIA) 10 mg tablet Take 1 tablet (10 mg total) by mouth once daily.    ginger root (GINGER EXTRACT ORAL) Take 1 capsule by mouth once daily.    OXYGEN-AIR DELIVERY SYSTEMS MISC by [facility].(Non-Drug; Combo Route) route. 3 L by nasal route     rivaroxaban (XARELTO) 20 mg Tab Take 1 tablet (20 mg total) by mouth daily with dinner or evening meal.     Family History       Problem Relation (Age of Onset)    Cancer Father    Diabetes Mother    Glaucoma Mother, Sister    Kidney disease Mother    Macular degeneration Mother, Sister, Brother          Tobacco Use    Smoking status: Every Day     Current packs/day: 1.00     Average packs/day: 1 pack/day for 46.0 years (46.0 ttl pk-yrs)     Types: Cigarettes    Smokeless tobacco: Never   Substance and Sexual Activity    Alcohol use: Not on file    Drug use: No    Sexual activity: Not on file     Review of Systems   Constitutional:  Positive for activity change and fatigue. Negative for chills and fever.   HENT:  Negative for sore throat.    Respiratory:  Negative for cough and shortness of breath.    Cardiovascular:  Negative for chest pain and palpitations.   Gastrointestinal:  Negative for abdominal pain, nausea and vomiting.   Endocrine: Negative for polydipsia and polyphagia.   Genitourinary:  Negative for dysuria, frequency and urgency.   Musculoskeletal:  Negative for back pain.   Neurological:  Negative for syncope.        Dysarthria.    Psychiatric/Behavioral:  Negative for confusion.      Objective:     Vital Signs (Most Recent):  Temp: 97.9 °F (36.6 °C) (06/09/24 1032)  Pulse: 72 (06/09/24 1117)  Resp: 17 (06/09/24 1112)  BP: (!) 141/91 (06/09/24 1117)  SpO2: 95 % (06/09/24 1117) Vital Signs (24h Range):  Temp:  [97.9 °F (36.6 °C)] 97.9 °F (36.6 °C)  Pulse:  [63-72] 72  Resp:  [17-22] 17  SpO2:  [95 %-97 %] 95 %  BP: (141-197)/(88-91) 141/91     Weight: 62.6 kg (138 lb)  Body mass index is 25.24 kg/m².     Physical Exam  Vitals reviewed.   Constitutional:       Appearance: Normal appearance.   HENT:      Head: Normocephalic and atraumatic.      Mouth/Throat:      Mouth: Mucous membranes are moist.   Eyes:      Conjunctiva/sclera: Conjunctivae normal.   Cardiovascular:      Rate and Rhythm: Normal rate  and regular rhythm.   Pulmonary:      Effort: Pulmonary effort is normal. No respiratory distress.      Breath sounds: Normal breath sounds. No wheezing.   Abdominal:      General: Abdomen is flat. Bowel sounds are normal. There is no distension.      Palpations: Abdomen is soft.   Musculoskeletal:         General: No swelling or tenderness.      Cervical back: Normal range of motion and neck supple.   Skin:     General: Skin is warm and dry.   Neurological:      Mental Status: She is alert and oriented to person, place, and time.      Comments: Mild expressive aphasia. Pt has generalized weakness -5/5 muscle strength bilateral upper and lower extremity.    Psychiatric:         Mood and Affect: Mood normal.         Behavior: Behavior normal.                Significant Labs: All pertinent labs within the past 24 hours have been reviewed.  Recent Lab Results  (Last 5 results in the past 24 hours)        06/09/24  1515   06/09/24  1126   06/09/24  1043   06/09/24  1041   06/09/24  1039        Albumin       3.7         ALP       50         ALT       29         Anion Gap       6         Appearance, UA Clear               AST       15         Baso #       0.03         Basophil %       0.2         Bilirubin (UA) Negative               BILIRUBIN TOTAL       0.6  Comment: For infants and newborns, interpretation of results should be based  on gestational age, weight and in agreement with clinical  observations.    Premature Infant recommended reference ranges:  Up to 24 hours.............<8.0 mg/dL  Up to 48 hours............<12.0 mg/dL  3-5 days..................<15.0 mg/dL  6-29 days.................<15.0 mg/dL           BUN       21         Calcium       8.5         Chloride       105         Cholesterol Total       193  Comment: The National Cholesterol Education Program (NCEP) has set the  following guidelines (reference ranges) for Cholesterol:  Optimal.....................<200 mg/dL  Borderline  High.............200-239 mg/dL  High........................> or = 240 mg/dL           CO2       25         Color, UA Colorless               Creatinine       1.1         Differential Method       Automated         eGFR       54.1         Eos #       0.4         Eos %       2.8         Glucose       76         Glucose, UA Negative               Gran # (ANC)       9.5         Gran %       63.1         HDL       62  Comment: The National Cholesterol Education Program (NCEP) has set the  following guidelines (reference values) for HDL Cholesterol:  Low...............<40 mg/dL  Optimal...........>60 mg/dL           HDL/Cholesterol Ratio       32.1         Hematocrit       47.0         Hemoglobin       15.6         Immature Grans (Abs)       0.17  Comment: Mild elevation in immature granulocytes is non specific and   can be seen in a variety of conditions including stress response,   acute inflammation, trauma and pregnancy. Correlation with other   laboratory and clinical findings is essential.           Immature Granulocytes       1.1         INR       1.0  Comment: Coumadin Therapy:  2.0 - 3.0 for INR for all indicators except mechanical heart valves  and antiphospholipid syndromes which should use 2.5 - 3.5.           Ketones, UA Negative               LDL Cholesterol       108.0  Comment: The National Cholesterol Education Program (NCEP) has set the  following guidelines (reference values) for LDL Cholesterol:  Optimal.......................<130 mg/dL  Borderline High...............130-159 mg/dL  High..........................160-189 mg/dL  Very High.....................>190 mg/dL           Leukocyte Esterase, UA Negative               Lymph #       3.9         Lymph %       25.9         MCH       30.5         MCHC       33.2         MCV       92         Mono #       1.0         Mono %       6.9         MPV       11.4         NITRITE UA Negative               Non-HDL Cholesterol       131  Comment: Risk category and  Non-HDL cholesterol goals:  Coronary heart disease (CHD)or equivalent (10-year risk of CHD >20%):  Non-HDL cholesterol goal     <130 mg/dL  Two or more CHD risk factors and 10-year risk of CHD <= 20%:  Non-HDL cholesterol goal     <160 mg/dL  0 to 1 CHD risk factor:  Non-HDL cholesterol goal     <190 mg/dL           nRBC       0         Blood, UA Negative               pH, UA 8.0               Platelet Count       134         POC Creatinine     1.1           POC Glucose         78       POC Lactate   0.90             Potassium       3.9         PROTEIN TOTAL       6.0         Protein, UA Negative  Comment: Recommend a 24 hour urine protein or a urine   protein/creatinine ratio if globulin induced proteinuria is  clinically suspected.                 PT       10.7         RBC       5.12         RDW       13.9         Sample   VENOUS   VENOUS           Sodium       136         Spec Grav UA >1.030               Specimen UA Urine, Clean Catch               Total Cholesterol/HDL Ratio       3.1         Triglycerides       115  Comment: The National Cholesterol Education Program (NCEP) has set the  following guidelines (reference values) for triglycerides:  Normal......................<150 mg/dL  Borderline High.............150-199 mg/dL  High........................200-499 mg/dL           Troponin I High Sensitivity       8.3  Comment: Troponin results differ between methods. Do not use   results between Troponin methods interchangeably.    Alkaline Phospatase levels above 400 U/L may   cause false positive results.    Access hsTnI should not be used for patients taking   Asfotase nubia (Strensiq).           TSH       2.715         UROBILINOGEN UA Negative               WBC       15.00                                Significant Imaging: I have reviewed all pertinent imaging results/findings within the past 24 hours.  Assessment/Plan:     * Stroke  - MRI brain showed Focal area of suspected diffusion restriction in  rightward central chapincito is suspicious for focal ischemia.  - Stroke pathway.  Neuro consultation, 2D echo, admit to tele.  - PT/OT/speech.  - suspect stroke to be embolic secondary to her AFib, while not being on any anticoagulation.  Spoken to Neurology, who recommended to start anticoagulation 48 hours after stroke.   - patient will need Holter monitor interrogation.  - will give 325 mg of aspirin now, start 81 mg tomorrow.  - Lipitor q.h.s.. will order Lipid panel.    A-fib  - Paroxysmal AFib.  Currently patient is in normal sinus.  - Patient was supposed to be on Xarelto, but apparently she does not have Xarelto at home, but rather Eliquis.  She refused to take Eliquis however as she read on the Internet that patients with valve replacement should not be taking Eliquis.  - Will consult Cardiology. Appreciate recs.  Admit to tele.  2D echo.  Status post a Holter monitor 2 days ago.  Appreciate cardiology assistance in Holter monitor interrogation.      Leukocytosis  Suspect reactive.  However will rule out infection.  Chest x-ray is negative.  Will order UA, COVID/flu.  Hold antibiotics for now.      Accelerated hypertension  Chronic, controlled. Latest blood pressure and vitals reviewed-     Temp:  [97.9 °F (36.6 °C)]   Pulse:  [58-72]   Resp:  [17-22]   BP: (141-197)/(88-91)   SpO2:  [94 %-97 %] .     Patient was not on any blood pressure medication at home.  Will allow for permissive hypertension today given her acute stroke.  If blood pressure continued to be elevated, primary team to consider starting blood pressure medication tomorrow.      S/p TAVR (transcatheter aortic valve replacement), bioprosthetic  Echo will be ordered.  Cardiology has been consulted.  Admit to tele.        VTE Risk Mitigation (From admission, onward)           Ordered     enoxaparin injection 40 mg  Daily         06/09/24 1552     IP VTE HIGH RISK PATIENT  Once         06/09/24 1552     Place sequential compression device  Until  discontinued         06/09/24 1552                                    Anny Sampson MD  Department of Hospital Medicine  [facility] - Emergency Dept

## 2024-06-09 NOTE — ED PROVIDER NOTES
Encounter Date: 6/9/2024       History     Chief Complaint   Patient presents with    Chest Pain    POST PROCEDURE PROBLEM     MONITOR INSERTED INTO HER CHEST ON FRIDAY.     Headache     ONSET YESTERDAY    Altered Mental Status     ONSET SAT     HPI patient is a 69-year-old woman who presents to the emergency department for evaluation of confusion upon waking up this morning sometime around 7:30 a.m..  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  She complains of a slight headache since yesterday.  Also complains of epigastric/substernal discomfort today.   reports that she is thinking and talking very slow today which is abnormal.  Patient feels like she is coming out of anesthesia in is sedated is how she describes it.  Patient has a history of TAVR bioprosthetic heart valve and stopped taking her Xarelto because she read somewhere that it should not be taken with a prosthetic heart valve  Review of patient's allergies indicates:   Allergen Reactions    Iodinated contrast media Shortness Of Breath    Pcn [penicillins] Other (See Comments)     Unable to obtain     Past Medical History:   Diagnosis Date    A-fib     Blind in both eyes     Hyperlipidemia     Hypertension     Stroke     x 3    Tobacco use 11/02/2017    Unspecified macular degeneration     Unspecified macular degeneration      Past Surgical History:   Procedure Laterality Date    ANGIOGRAPHY OF LOWER EXTREMITY N/A 11/01/2023    Procedure: ANGIOGRAM BILATERLA LOWER;  Surgeon: Martin Hair MD;  Location: [facility] CATH/EP LAB;  Service: General;  Laterality: N/A;    AORTIC VALVE REPLACEMENT      AORTOGRAPHY WITH EXTREMITY RUNOFF N/A 11/01/2023    Procedure: AORTOGRAM, WITH EXTREMITY RUNOFF;  Surgeon: Martin Hair MD;  Location: [facility] CATH/EP LAB;  Service: General;  Laterality: N/A;    BLADDER  SURGERY      BLADDER SURGERY      Lift.    CATARACT EXTRACTION      CHOLECYSTECTOMY      HYSTERECTOMY      KNEE SURGERY Left     TONSILLECTOMY       Family History   Problem Relation Name Age of Onset    Diabetes Mother      Kidney disease Mother      Glaucoma Mother      Macular degeneration Mother      Cancer Father      Glaucoma Sister      Macular degeneration Sister      Macular degeneration Brother       Social History     Tobacco Use    Smoking status: Every Day     Current packs/day: 1.00     Average packs/day: 1 pack/day for 46.0 years (46.0 ttl pk-yrs)     Types: Cigarettes    Smokeless tobacco: Never   Substance Use Topics    Drug use: No     Review of Systems   Constitutional:  Positive for fatigue. Negative for fever.   HENT:  Negative for sore throat.    Respiratory:  Positive for shortness of breath.    Cardiovascular:  Positive for chest pain.   Gastrointestinal:  Positive for abdominal pain (epigastric). Negative for nausea.   Genitourinary:  Negative for dysuria.   Musculoskeletal:  Negative for back pain.   Skin:  Negative for rash.   Neurological:  Positive for speech difficulty, weakness and headaches.   Hematological:  Does not bruise/bleed easily.       Physical Exam     Initial Vitals [06/09/24 1032]   BP Pulse Resp Temp SpO2   (!) 197/88 63 (!) 22 97.9 °F (36.6 °C) 97 %      MAP       --         Physical Exam    Constitutional: Vital signs are normal. She appears well-developed and well-nourished.  Non-toxic appearance. No distress.   Speech is slowed.   HENT:   Head: Normocephalic and atraumatic.   Eyes: EOM are normal. Pupils are equal, round, and reactive to light.   Neck: Neck supple. No JVD present.   Normal range of motion.  Cardiovascular:  Normal rate, regular rhythm, normal heart sounds and intact distal pulses.     Exam reveals no gallop and no friction rub.       No murmur heard.  Pulmonary/Chest: Breath sounds normal. Tachypnea noted. She has no wheezes. She has no rhonchi. She  has no rales.   Demonstrating pursed lip breathing.   Abdominal: Abdomen is soft. Bowel sounds are normal. There is no abdominal tenderness. There is no rebound and no guarding.   Musculoskeletal:         General: Normal range of motion.      Cervical back: Normal range of motion and neck supple. No rigidity.     Neurological: She is alert and oriented to person, place, and time. She has normal strength and normal reflexes. No cranial nerve deficit or sensory deficit. She exhibits abnormal muscle tone. Coordination normal. GCS eye subscore is 4. GCS verbal subscore is 5. GCS motor subscore is 6.   4/5 strength in bilateral lower extremities, 5/5 strength in bilateral upper extremities.   Skin: Skin is warm and dry.   Psychiatric: She has a normal mood and affect. Her speech is normal and behavior is normal. She is not actively hallucinating.         ED Course   Procedures  Labs Reviewed   CBC W/ AUTO DIFFERENTIAL - Abnormal; Notable for the following components:       Result Value    WBC 15.00 (*)     Platelets 134 (*)     Immature Granulocytes 1.1 (*)     Gran # (ANC) 9.5 (*)     Immature Grans (Abs) 0.17 (*)     All other components within normal limits   COMPREHENSIVE METABOLIC PANEL - Abnormal; Notable for the following components:    Calcium 8.5 (*)     Alkaline Phosphatase 50 (*)     eGFR 54.1 (*)     Anion Gap 6 (*)     All other components within normal limits   URINALYSIS, REFLEX TO URINE CULTURE - Abnormal; Notable for the following components:    Color, UA Colorless (*)     Specific Gravity, UA >1.030 (*)     All other components within normal limits    Narrative:     Specimen Source->Urine   CULTURE, BLOOD    Narrative:     Aerobic and anaerobic  Collection has been rescheduled by HRA at 06/09/2024 12:55 Reason:   Patient unavailable eric  Collection has been rescheduled by HRA at 06/09/2024 12:55 Reason:   Patient unavailable eric   CULTURE, BLOOD    Narrative:     Aerobic and anaerobic  Collection has  been rescheduled by HRA at 06/09/2024 12:55 Reason:   Patient unavailable eric  Collection has been rescheduled by HRA at 06/09/2024 12:55 Reason:   Patient unavailable eric   PROTIME-INR   TSH   LIPID PANEL   LACTIC ACID, PLASMA   TROPONIN I HIGH SENSITIVITY   TROPONIN I HIGH SENSITIVITY   HEMOGLOBIN A1C   ISTAT CREATININE   POCT GLUCOSE   ISTAT LACTATE   POCT GLUCOSE MONITORING CONTINUOUS   POCT LACTATE        ECG Results              ECG 12 lead (In process)        Collection Time Result Time QRS Duration OHS QTC Calculation    06/09/24 10:00:20 06/09/24 10:40:29 62 429                     In process by Interface, Lab In [facility] (06/09/24 10:40:37)                   Narrative:    Test Reason : R29.818,    Vent. Rate : 063 BPM     Atrial Rate : 063 BPM     P-R Int : 138 ms          QRS Dur : 062 ms      QT Int : 420 ms       P-R-T Axes : 023 069 071 degrees     QTc Int : 429 ms    Normal sinus rhythm  Normal ECG  When compared with ECG of 05-APR-2024 10:37,  T wave inversion no longer evident in Inferior leads  T wave inversion no longer evident in Lateral leads    Referred By: [email or web address]   SELF           Confirmed By:                                   Imaging Results               MRI Brain Without Contrast (Final result)  Result time 06/09/24 13:35:31   Procedure changed from MRI Brain Ischemic Inter Pro Incl MRA w/o Con     Final result by Ozzy Mcbride MD (06/09/24 13:35:31)                   Impression:      1. Focal area of suspected diffusion restriction in rightward central chapincito is suspicious for focal ischemia, although artifactual etiology is conceivable but felt less likely.  Clinical correlation is needed.  2. Chronic small vessel ischemic changes.  3. Old lacunar infarct in rightward central chapincito superiorly.  This report was flagged in Epic as abnormal.      Electronically signed by: Ozzy Mcbride  Date:    06/09/2024  Time:    13:35               Narrative:    EXAMINATION:  MRI BRAIN WITHOUT  CONTRAST    CLINICAL HISTORY:  Stroke, follow up;    TECHNIQUE:  MRI brain without IV contrast    COMPARISON:  CT 06/09/2024    FINDINGS:  Focal area of increased signal in rightward central chapincito on diffusion sequence may have corresponding low signal on ADC map, but is somewhat nonspecific.  Diffusion-weighted imaging shows no additional diffusion restriction.    Cerebral and cerebellar atrophy is present.  Mild periventricular and subcortical white matter T2 hyperintensities suggest gliosis from chronic small vessel ischemic changes.  In right central chapincito superiorly, old lacunar infarct is present.    Calvarial bone marrow signal is normal.  Sinuses and mastoids are clear.    No intracranial mass effect or midline shift.  Ventricles and cisterns are maintained.  Major intracranial flow voids are unremarkable.                                       MRA Brain without contrast (Final result)  Result time 06/09/24 13:23:06      Final result by Ozzy Mcbride MD (06/09/24 13:23:06)                   Impression:      Negative MRA brain      Electronically signed by: Ozzy Mcbride  Date:    06/09/2024  Time:    13:23               Narrative:    EXAMINATION:  MRA BRAIN WITHOUT CONTRAST    CLINICAL HISTORY:  Stroke, follow up;    TECHNIQUE:  3-D time-of-flight MR angiography of brain without contrast.    COMPARISON:  CTA 06/09/2024    FINDINGS:  Major intracranial arteries show normal intraluminal flow related signal. Negative for vascular occlusion, focal stenosis, or dissection. Negative for aneurysm or evidence of vasculitis.                                       X-Ray Chest AP Portable (Final result)  Result time 06/09/24 11:56:06      Final result by Ozzy Mcbride MD (06/09/24 11:56:06)                   Impression:      No acute cardiopulmonary abnormality.      Electronically signed by: Ozzy Mcbride  Date:    06/09/2024  Time:    11:56               Narrative:    EXAMINATION:  XR CHEST AP PORTABLE    CLINICAL  HISTORY:  Sepsis;    FINDINGS:  Portable chest at 11 19 compared with 04/27/2024 shows unchanged cardiomediastinal silhouette. Prior aortic valve replacement evident.  Implantable cardiac monitor projecting over left chest his newly been placed in the interim.    Lungs are clear. Pulmonary vasculature is normal. Old left rib fractures unchanged.  No acute osseous abnormality                                       CTA Head and Neck (xpd) (Final result)  Result time 06/09/24 11:40:05      Final result by Ozzy Mcbride MD (06/09/24 11:40:05)                   Impression:      1. 30% stenosis of proximal left ICA due to noncalcified plaque.  2. 30-40% stenosis of left vertebral artery V2 and V4 segments.  3. 20% proximal right ICA stenosis due to noncalcified plaque.  4. 50% stenosis of the V1 segment right vertebral artery.  5. Mild peripheral atherosclerotic plaque affecting intracranial internal carotid arteries without stenosis.      Electronically signed by: Ozzy Mcbride  Date:    06/09/2024  Time:    11:40               Narrative:    EXAMINATION:  CTA HEAD AND NECK (XPD)    CLINICAL HISTORY:  Neuro deficit, acute, stroke suspected;    TECHNIQUE:  CT angiography of brain and neck with 100 mL Omnipaque 350. Maximum intensity projection coronal reformations were obtained at a separate workstation and stored in the patient's permanent medical record.    COMPARISON:  CT 06/09/2024, MRIs 09/20/2017    FINDINGS:  CTA BRAIN:    VASCULAR FINDINGS:    Mild peripheral calcified plaque affects cavernous segments of bilateral internal carotid arteries without significant narrowing.    Other major intracranial arteries are patent without atherosclerotic plaque, stenosis, intraluminal filling defect, or dissection.  Negative for aneurysm or evidence of vasculitis.    NONVASCULAR FINDINGS:    No midline shift.  No abnormal intra-axial or extra-axial enhancement.  Trace maxillary sinus mucosal thickening.    CTA NECK:    VASCULAR  FINDINGS:    Conventional 3 branch vessel aortic arch anatomy is present.    Left common carotid artery is patent. Noncalcified plaque results in 30% stenosis of proximal left internal carotid artery. Left ECA and branches opacify.  30-40% stenosis affects V2 segment of left vertebral artery and V4 segment, due to atherosclerotic plaque.    Right brachiocephalic artery is patent. Mild noncalcified plaque affects right common carotid artery. Noncalcified plaque in right carotid bulb extends into right internal carotid artery origin resulting in 20% right ICA stenosis.  Predominantly noncalcified plaque of V1 segment right vertebral artery results in 50% stenosis.    NONVASCULAR FINDINGS:    Visualized lung apices are clear.  Cervical soft tissues are unremarkable.  Implantable cardiac monitor left anterior chest wall incidentally noted.  No acute osseous abnormality.                                       CT HEAD FOR STROKE (Final result)  Result time 06/09/24 11:17:32      Final result by Ozzy Mcbride MD (06/09/24 11:17:32)                   Impression:      1. No acute intracranial abnormality.  2. Moderate chronic small vessel ischemic changes.  RESULT NOTIFICATION: These observations were discussed by Dr. Mcbride with, and acknowledged by, Dr. Brand at 11:17      Electronically signed by: Ozzy Mcbride  Date:    06/09/2024  Time:    11:17               Narrative:    EXAMINATION:  CT HEAD FOR STROKE    CLINICAL HISTORY:  Neuro deficit, acute, stroke suspected;    TECHNIQUE:  Head CT without IV contrast.    COMPARISON:  MRI 09/20/2017, CT 09/20/2017    FINDINGS:  Gray-white differentiation is maintained without hemorrhage, midline shift, or mass effect.    Minor chronic small-vessel ischemic changes affect periventricular white matter bilaterally.    The ventricles and cisterns are maintained.    Calvarium is intact. Trace maxillary sinus mucosal thickening is evident.                                        Medications   sodium chloride 0.9% flush 10 mL (has no administration in time range)   sodium chloride 0.9% bolus 500 mL 500 mL (has no administration in time range)   labetaloL injection 10 mg (has no administration in time range)   bisacodyL suppository 10 mg (has no administration in time range)   aspirin EC tablet 81 mg (81 mg Oral Given 6/10/24 0813)   enoxaparin injection 40 mg (40 mg Subcutaneous Given 6/9/24 1835)   atorvastatin tablet 40 mg (40 mg Oral Given 6/9/24 2058)   ezetimibe tablet 10 mg (10 mg Oral Given 6/10/24 0813)   budesonide nebulizer solution 0.5 mg (0.5 mg Nebulization Given 6/10/24 0726)   arformoteroL nebulizer solution 15 mcg (15 mcg Nebulization Given 6/10/24 0726)   diphenhydrAMINE injection 50 mg (50 mg Intravenous Given 6/9/24 1134)   cefTRIAXone (ROCEPHIN) 1 g in dextrose 5 % 100 mL IVPB (ready to mix) (0 g Intravenous Stopped 6/9/24 1609)   iohexoL (OMNIPAQUE 350) injection 100 mL (100 mLs Intravenous Given 6/9/24 1113)   aspirin chewable tablet 324 mg (324 mg Oral Given 6/9/24 1835)     Medical Decision Making  69-year-old history of Afib currently not on any anticoagulation, prior stroke, and blind both eyes presented to the ER because of lethargy 2 days s/p Holter monitor for her h/o afib.She went to sleep yesterday with no neuro deficits and woke up today feeling lethargic, and was slow to answer questions.  NIHS 1.  Stroke workup initiated and real patient had a right central pontine area of ischemia concerning for an acute ischemic CVA.  Neurology consulted and made recommendations.  Patient will be admitted to hospital medicine which I discussed the case with.  Not a tPA candidate due to low NIH and likely outside the window      Amount and/or Complexity of Data Reviewed  Labs: ordered.     Details: Troponin 8.3, glucose 78, TSH 2.7, creatinine 1.1, sodium 136  Radiology: ordered.    Risk  Prescription drug management.  Decision regarding hospitalization.    Critical  Care  Total time providing critical care: 50 minutes                                      Clinical Impression:  Final diagnoses:  [R29.818] Acute focal neurological deficit  [I63.9] Ischemic cerebrovascular accident (CVA) - r chapincito (Primary)          ED Disposition Condition    Observation                 Gonzalez Brand MD  06/10/24 0949

## 2024-06-09 NOTE — ASSESSMENT & PLAN NOTE
Chronic, controlled. Latest blood pressure and vitals reviewed-     Temp:  [97.9 °F (36.6 °C)]   Pulse:  [58-72]   Resp:  [17-22]   BP: (141-197)/(88-91)   SpO2:  [94 %-97 %] .     Patient was not on any blood pressure medication at home.  Will allow for permissive hypertension today given her acute stroke.  If blood pressure continued to be elevated, primary team to consider starting blood pressure medication tomorrow.

## 2024-06-09 NOTE — SUBJECTIVE & OBJECTIVE
Past Medical History:   Diagnosis Date    A-fib     Blind in both eyes     Hyperlipidemia     Hypertension     Stroke     x 3    Tobacco use 11/02/2017    Unspecified macular degeneration     Unspecified macular degeneration        Past Surgical History:   Procedure Laterality Date    ANGIOGRAPHY OF LOWER EXTREMITY N/A 11/01/2023    Procedure: ANGIOGRAM BILATERLA LOWER;  Surgeon: Martin Hair MD;  Location: [facility] [place] CATH/EP LAB;  Service: General;  Laterality: N/A;    AORTIC VALVE REPLACEMENT      AORTOGRAPHY WITH EXTREMITY RUNOFF N/A 11/01/2023    Procedure: AORTOGRAM, WITH EXTREMITY RUNOFF;  Surgeon: Martin Hair MD;  Location: [facility] [place] CATH/EP LAB;  Service: General;  Laterality: N/A;    BLADDER SURGERY      BLADDER SURGERY      Lift.    CATARACT EXTRACTION      CHOLECYSTECTOMY      HYSTERECTOMY      KNEE SURGERY Left     TONSILLECTOMY         Review of patient's allergies indicates:   Allergen Reactions    Iodinated contrast media Shortness Of Breath    Pcn [penicillins] Other (See Comments)     Unable to obtain       No current facility-administered medications on file prior to encounter.     Current Outpatient Medications on File Prior to Encounter   Medication Sig    albuterol (PROVENTIL/VENTOLIN HFA) 90 mcg/actuation inhaler INHALE 2 PUFFS INTO THE LUNGS EVERY 6 HOURS AS NEEDED FOR WHEEZING    albuterol-ipratropium (DUO-NEB) 2.5 mg-0.5 mg/3 mL nebulizer solution Take 3 mLs by nebulization every 6 (six) hours as needed for Wheezing. Rescue    aspirin (ECOTRIN) 81 MG EC tablet Take 1 tablet (81 mg total) by mouth once daily.    atorvastatin (LIPITOR) 40 MG tablet Take 1 tablet (40 mg total) by mouth once daily.    budesonide-formoterol 160-4.5 mcg (SYMBICORT) 160-4.5 mcg/actuation HFAA Inhale 2 puffs into the lungs 2 (two) times daily.    CAPSICUM, CAYENNE, ORAL Take 1 capsule by mouth Daily.    cholecalciferol, vitamin D3, 1,250 mcg (50,000 unit) capsule Take 50,000 Units by mouth.    cinnamon bark  (CINNAMON) 500 mg capsule Take 500 mg by mouth once daily.    ezetimibe (ZETIA) 10 mg tablet Take 1 tablet (10 mg total) by mouth once daily.    cole root (COLE EXTRACT ORAL) Take 1 capsule by mouth once daily.    OXYGEN-AIR DELIVERY SYSTEMS MISC by Misc.(Non-Drug; Combo Route) route. 3 L by nasal route    rivaroxaban (XARELTO) 20 mg Tab Take 1 tablet (20 mg total) by mouth daily with dinner or evening meal.     Family History       Problem Relation (Age of Onset)    Cancer Father    Diabetes Mother    Glaucoma Mother, Sister    Kidney disease Mother    Macular degeneration Mother, Sister, Brother          Tobacco Use    Smoking status: Every Day     Current packs/day: 1.00     Average packs/day: 1 pack/day for 46.0 years (46.0 ttl pk-yrs)     Types: Cigarettes    Smokeless tobacco: Never   Substance and Sexual Activity    Alcohol use: Not on file    Drug use: No    Sexual activity: Not on file     Review of Systems   Constitutional:  Positive for activity change and fatigue. Negative for chills and fever.   HENT:  Negative for sore throat.    Respiratory:  Negative for cough and shortness of breath.    Cardiovascular:  Negative for chest pain and palpitations.   Gastrointestinal:  Negative for abdominal pain, nausea and vomiting.   Endocrine: Negative for polydipsia and polyphagia.   Genitourinary:  Negative for dysuria, frequency and urgency.   Musculoskeletal:  Negative for back pain.   Neurological:  Negative for syncope.        Dysarthria.    Psychiatric/Behavioral:  Negative for confusion.      Objective:     Vital Signs (Most Recent):  Temp: 97.9 °F (36.6 °C) (06/09/24 1032)  Pulse: 72 (06/09/24 1117)  Resp: 17 (06/09/24 1112)  BP: (!) 141/91 (06/09/24 1117)  SpO2: 95 % (06/09/24 1117) Vital Signs (24h Range):  Temp:  [97.9 °F (36.6 °C)] 97.9 °F (36.6 °C)  Pulse:  [63-72] 72  Resp:  [17-22] 17  SpO2:  [95 %-97 %] 95 %  BP: (141-197)/(88-91) 141/91     Weight: 62.6 kg (138 lb)  Body mass index is 25.24  kg/m².     Physical Exam  Vitals reviewed.   Constitutional:       Appearance: Normal appearance.   HENT:      Head: Normocephalic and atraumatic.      Mouth/Throat:      Mouth: Mucous membranes are moist.   Eyes:      Conjunctiva/sclera: Conjunctivae normal.   Cardiovascular:      Rate and Rhythm: Normal rate and regular rhythm.   Pulmonary:      Effort: Pulmonary effort is normal. No respiratory distress.      Breath sounds: Normal breath sounds. No wheezing.   Abdominal:      General: Abdomen is flat. Bowel sounds are normal. There is no distension.      Palpations: Abdomen is soft.   Musculoskeletal:         General: No swelling or tenderness.      Cervical back: Normal range of motion and neck supple.   Skin:     General: Skin is warm and dry.   Neurological:      Mental Status: She is alert and oriented to person, place, and time.      Comments: Mild expressive aphasia. Pt has generalized weakness -5/5 muscle strength bilateral upper and lower extremity.    Psychiatric:         Mood and Affect: Mood normal.         Behavior: Behavior normal.                Significant Labs: All pertinent labs within the past 24 hours have been reviewed.  Recent Lab Results  (Last 5 results in the past 24 hours)        06/09/24  1515   06/09/24  1126   06/09/24  1043   06/09/24  1041   06/09/24  1039        Albumin       3.7         ALP       50         ALT       29         Anion Gap       6         Appearance, UA Clear               AST       15         Baso #       0.03         Basophil %       0.2         Bilirubin (UA) Negative               BILIRUBIN TOTAL       0.6  Comment: For infants and newborns, interpretation of results should be based  on gestational age, weight and in agreement with clinical  observations.    Premature Infant recommended reference ranges:  Up to 24 hours.............<8.0 mg/dL  Up to 48 hours............<12.0 mg/dL  3-5 days..................<15.0 mg/dL  6-29 days.................<15.0 mg/dL            BUN       21         Calcium       8.5         Chloride       105         Cholesterol Total       193  Comment: The National Cholesterol Education Program (NCEP) has set the  following guidelines (reference ranges) for Cholesterol:  Optimal.....................<200 mg/dL  Borderline High.............200-239 mg/dL  High........................> or = 240 mg/dL           CO2       25         Color, UA Colorless               Creatinine       1.1         Differential Method       Automated         eGFR       54.1         Eos #       0.4         Eos %       2.8         Glucose       76         Glucose, UA Negative               Gran # (ANC)       9.5         Gran %       63.1         HDL       62  Comment: The National Cholesterol Education Program (NCEP) has set the  following guidelines (reference values) for HDL Cholesterol:  Low...............<40 mg/dL  Optimal...........>60 mg/dL           HDL/Cholesterol Ratio       32.1         Hematocrit       47.0         Hemoglobin       15.6         Immature Grans (Abs)       0.17  Comment: Mild elevation in immature granulocytes is non specific and   can be seen in a variety of conditions including stress response,   acute inflammation, trauma and pregnancy. Correlation with other   laboratory and clinical findings is essential.           Immature Granulocytes       1.1         INR       1.0  Comment: Coumadin Therapy:  2.0 - 3.0 for INR for all indicators except mechanical heart valves  and antiphospholipid syndromes which should use 2.5 - 3.5.           Ketones, UA Negative               LDL Cholesterol       108.0  Comment: The National Cholesterol Education Program (NCEP) has set the  following guidelines (reference values) for LDL Cholesterol:  Optimal.......................<130 mg/dL  Borderline High...............130-159 mg/dL  High..........................160-189 mg/dL  Very High.....................>190 mg/dL           Leukocyte Esterase, UA Negative                Lymph #       3.9         Lymph %       25.9         MCH       30.5         MCHC       33.2         MCV       92         Mono #       1.0         Mono %       6.9         MPV       11.4         NITRITE UA Negative               Non-HDL Cholesterol       131  Comment: Risk category and Non-HDL cholesterol goals:  Coronary heart disease (CHD)or equivalent (10-year risk of CHD >20%):  Non-HDL cholesterol goal     <130 mg/dL  Two or more CHD risk factors and 10-year risk of CHD <= 20%:  Non-HDL cholesterol goal     <160 mg/dL  0 to 1 CHD risk factor:  Non-HDL cholesterol goal     <190 mg/dL           nRBC       0         Blood, UA Negative               pH, UA 8.0               Platelet Count       134         POC Creatinine     1.1           POC Glucose         78       POC Lactate   0.90             Potassium       3.9         PROTEIN TOTAL       6.0         Protein, UA Negative  Comment: Recommend a 24 hour urine protein or a urine   protein/creatinine ratio if globulin induced proteinuria is  clinically suspected.                 PT       10.7         RBC       5.12         RDW       13.9         Sample   VENOUS   VENOUS           Sodium       136         Spec Grav UA >1.030               Specimen UA Urine, Clean Catch               Total Cholesterol/HDL Ratio       3.1         Triglycerides       115  Comment: The National Cholesterol Education Program (NCEP) has set the  following guidelines (reference values) for triglycerides:  Normal......................<150 mg/dL  Borderline High.............150-199 mg/dL  High........................200-499 mg/dL           Troponin I High Sensitivity       8.3  Comment: Troponin results differ between methods. Do not use   results between Troponin methods interchangeably.    Alkaline Phospatase levels above 400 U/L may   cause false positive results.    Access hsTnI should not be used for patients taking   Asfotase nubia (Strensiq).           TSH       2.715          UROBILINOGEN UA Negative               WBC       15.00                                Significant Imaging: I have reviewed all pertinent imaging results/findings within the past 24 hours.

## 2024-06-10 ENCOUNTER — CLINICAL SUPPORT (OUTPATIENT)
Dept: CARDIOLOGY | Facility: HOSPITAL | Age: 69
DRG: 066 | End: 2024-06-10
Attending: HOSPITALIST
Payer: MEDICARE

## 2024-06-10 ENCOUNTER — TELEPHONE (OUTPATIENT)
Dept: CARDIOLOGY | Facility: CLINIC | Age: 69
End: 2024-06-10
Payer: MEDICARE

## 2024-06-10 VITALS — BODY MASS INDEX: 25.55 KG/M2 | WEIGHT: 138.88 LBS | HEIGHT: 62 IN

## 2024-06-10 LAB
ALBUMIN SERPL BCP-MCNC: 3.2 G/DL (ref 3.5–5.2)
ALP SERPL-CCNC: 52 U/L (ref 55–135)
ALT SERPL W/O P-5'-P-CCNC: 21 U/L (ref 10–44)
ANION GAP SERPL CALC-SCNC: 7 MMOL/L (ref 8–16)
APTT PPP: 30 SEC (ref 21–32)
AST SERPL-CCNC: 12 U/L (ref 10–40)
AV INDEX (PROSTH): 0.95
AV MEAN GRADIENT: 10 MMHG
AV PEAK GRADIENT: 19 MMHG
AV VALVE AREA BY VELOCITY RATIO: 1.63 CM²
AV VALVE AREA: 1.91 CM²
AV VELOCITY RATIO: 0.81
BASOPHILS # BLD AUTO: 0.03 K/UL (ref 0–0.2)
BASOPHILS NFR BLD: 0.2 % (ref 0–1.9)
BILIRUB SERPL-MCNC: 0.4 MG/DL (ref 0.1–1)
BSA FOR ECHO PROCEDURE: 1.66 M2
BUN SERPL-MCNC: 25 MG/DL (ref 8–23)
CALCIUM SERPL-MCNC: 8.3 MG/DL (ref 8.7–10.5)
CHLORIDE SERPL-SCNC: 105 MMOL/L (ref 95–110)
CO2 SERPL-SCNC: 26 MMOL/L (ref 23–29)
CREAT SERPL-MCNC: 1.1 MG/DL (ref 0.5–1.4)
CV ECHO LV RWT: 0.38 CM
DIFFERENTIAL METHOD BLD: ABNORMAL
DOP CALC AO PEAK VEL: 2.17 M/S
DOP CALC AO VTI: 47 CM
DOP CALC LVOT AREA: 2 CM2
DOP CALC LVOT DIAMETER: 1.6 CM
DOP CALC LVOT PEAK VEL: 1.76 M/S
DOP CALC LVOT STROKE VOLUME: 89.63 CM3
DOP CALC MV VTI: 44.2 CM
DOP CALCLVOT PEAK VEL VTI: 44.6 CM
E WAVE DECELERATION TIME: 285 MSEC
E/A RATIO: 0.84
E/E' RATIO: 15.69 M/S
ECHO LV POSTERIOR WALL: 0.7 CM (ref 0.6–1.1)
EOSINOPHIL # BLD AUTO: 0.6 K/UL (ref 0–0.5)
EOSINOPHIL NFR BLD: 4.7 % (ref 0–8)
ERYTHROCYTE [DISTWIDTH] IN BLOOD BY AUTOMATED COUNT: 13.9 % (ref 11.5–14.5)
EST. GFR  (NO RACE VARIABLE): 54.1 ML/MIN/1.73 M^2
FRACTIONAL SHORTENING: 24 % (ref 28–44)
GLUCOSE SERPL-MCNC: 90 MG/DL (ref 70–110)
HCT VFR BLD AUTO: 44.2 % (ref 37–48.5)
HGB BLD-MCNC: 14.3 G/DL (ref 12–16)
IMM GRANULOCYTES # BLD AUTO: 0.15 K/UL (ref 0–0.04)
IMM GRANULOCYTES NFR BLD AUTO: 1.1 % (ref 0–0.5)
INFLUENZA A, MOLECULAR: NEGATIVE
INFLUENZA B, MOLECULAR: NEGATIVE
INR PPP: 1 (ref 0.8–1.2)
INTERVENTRICULAR SEPTUM: 0.8 CM (ref 0.6–1.1)
IVC DIAMETER: 1.4 CM
LEFT INTERNAL DIMENSION IN SYSTOLE: 2.8 CM (ref 2.1–4)
LEFT VENTRICLE DIASTOLIC VOLUME INDEX: 35.43 ML/M2
LEFT VENTRICLE DIASTOLIC VOLUME: 58.1 ML
LEFT VENTRICLE MASS INDEX: 46 G/M2
LEFT VENTRICLE SYSTOLIC VOLUME INDEX: 18 ML/M2
LEFT VENTRICLE SYSTOLIC VOLUME: 29.6 ML
LEFT VENTRICULAR INTERNAL DIMENSION IN DIASTOLE: 3.7 CM (ref 3.5–6)
LEFT VENTRICULAR MASS: 75.44 G
LV LATERAL E/E' RATIO: 14.57 M/S
LV SEPTAL E/E' RATIO: 17 M/S
LVOT MG: 7 MMHG
LVOT MV: 1.17 CM/S
LYMPHOCYTES # BLD AUTO: 3.7 K/UL (ref 1–4.8)
LYMPHOCYTES NFR BLD: 28.2 % (ref 18–48)
MAGNESIUM SERPL-MCNC: 2.3 MG/DL (ref 1.6–2.6)
MCH RBC QN AUTO: 30.5 PG (ref 27–31)
MCHC RBC AUTO-ENTMCNC: 32.4 G/DL (ref 32–36)
MCV RBC AUTO: 94 FL (ref 82–98)
MONOCYTES # BLD AUTO: 0.9 K/UL (ref 0.3–1)
MONOCYTES NFR BLD: 6.4 % (ref 4–15)
MV MEAN GRADIENT: 2 MMHG
MV PEAK A VEL: 1.22 M/S
MV PEAK E VEL: 1.02 M/S
MV PEAK GRADIENT: 5 MMHG
MV VALVE AREA BY CONTINUITY EQUATION: 2.03 CM2
NEUTROPHILS # BLD AUTO: 7.9 K/UL (ref 1.8–7.7)
NEUTROPHILS NFR BLD: 59.4 % (ref 38–73)
NRBC BLD-RTO: 0 /100 WBC
OHS LV EJECTION FRACTION SIMPSONS BIPLANE MOD: 67 %
OHS QRS DURATION: 62 MS
OHS QTC CALCULATION: 429 MS
PHOSPHATE SERPL-MCNC: 4.2 MG/DL (ref 2.7–4.5)
PISA TR MAX VEL: 2.04 M/S
PLATELET # BLD AUTO: 125 K/UL (ref 150–450)
PMV BLD AUTO: 12.4 FL (ref 9.2–12.9)
POTASSIUM SERPL-SCNC: 4.1 MMOL/L (ref 3.5–5.1)
PROT SERPL-MCNC: 5.4 G/DL (ref 6–8.4)
PROTHROMBIN TIME: 10.8 SEC (ref 9–12.5)
PV MV: 0.74 M/S
PV PEAK GRADIENT: 5 MMHG
PV PEAK VELOCITY: 1.14 M/S
RBC # BLD AUTO: 4.69 M/UL (ref 4–5.4)
RV TISSUE DOPPLER FREE WALL SYSTOLIC VELOCITY 1 (APICAL 4 CHAMBER VIEW): 13.6 CM/S
SARS-COV-2 RDRP RESP QL NAA+PROBE: NEGATIVE
SODIUM SERPL-SCNC: 138 MMOL/L (ref 136–145)
SPECIMEN SOURCE: NORMAL
TDI LATERAL: 0.07 M/S
TDI SEPTAL: 0.06 M/S
TDI: 0.07 M/S
TR MAX PG: 17 MMHG
TRICUSPID ANNULAR PLANE SYSTOLIC EXCURSION: 2.4 CM
WBC # BLD AUTO: 13.24 K/UL (ref 3.9–12.7)
Z-SCORE OF LEFT VENTRICULAR DIMENSION IN END DIASTOLE: -2.21
Z-SCORE OF LEFT VENTRICULAR DIMENSION IN END SYSTOLE: -0.17

## 2024-06-10 PROCEDURE — 93306 TTE W/DOPPLER COMPLETE: CPT | Mod: 26,,, | Performed by: GENERAL PRACTICE

## 2024-06-10 PROCEDURE — 36415 COLL VENOUS BLD VENIPUNCTURE: CPT | Performed by: HOSPITALIST

## 2024-06-10 PROCEDURE — 84100 ASSAY OF PHOSPHORUS: CPT | Performed by: HOSPITALIST

## 2024-06-10 PROCEDURE — 80053 COMPREHEN METABOLIC PANEL: CPT | Performed by: HOSPITALIST

## 2024-06-10 PROCEDURE — 99900035 HC TECH TIME PER 15 MIN (STAT)

## 2024-06-10 PROCEDURE — 85730 THROMBOPLASTIN TIME PARTIAL: CPT | Performed by: HOSPITALIST

## 2024-06-10 PROCEDURE — 93306 TTE W/DOPPLER COMPLETE: CPT

## 2024-06-10 PROCEDURE — 92610 EVALUATE SWALLOWING FUNCTION: CPT

## 2024-06-10 PROCEDURE — 25000003 PHARM REV CODE 250: Performed by: HOSPITALIST

## 2024-06-10 PROCEDURE — 83735 ASSAY OF MAGNESIUM: CPT | Performed by: HOSPITALIST

## 2024-06-10 PROCEDURE — 21000000 HC CCU ICU ROOM CHARGE

## 2024-06-10 PROCEDURE — 85025 COMPLETE CBC W/AUTO DIFF WBC: CPT | Performed by: HOSPITALIST

## 2024-06-10 PROCEDURE — 97165 OT EVAL LOW COMPLEX 30 MIN: CPT

## 2024-06-10 PROCEDURE — 94640 AIRWAY INHALATION TREATMENT: CPT | Mod: XB

## 2024-06-10 PROCEDURE — 97535 SELF CARE MNGMENT TRAINING: CPT

## 2024-06-10 PROCEDURE — 94761 N-INVAS EAR/PLS OXIMETRY MLT: CPT

## 2024-06-10 PROCEDURE — 99900031 HC PATIENT EDUCATION (STAT)

## 2024-06-10 PROCEDURE — 63600175 PHARM REV CODE 636 W HCPCS: Performed by: HOSPITALIST

## 2024-06-10 PROCEDURE — 92523 SPEECH SOUND LANG COMPREHEN: CPT

## 2024-06-10 PROCEDURE — 25000242 PHARM REV CODE 250 ALT 637 W/ HCPCS: Performed by: HOSPITALIST

## 2024-06-10 PROCEDURE — 85610 PROTHROMBIN TIME: CPT | Performed by: HOSPITALIST

## 2024-06-10 RX ADMIN — ARFORMOTEROL TARTRATE 15 MCG: 15 SOLUTION RESPIRATORY (INHALATION) at 08:06

## 2024-06-10 RX ADMIN — EZETIMIBE 10 MG: 10 TABLET ORAL at 08:06

## 2024-06-10 RX ADMIN — ENOXAPARIN SODIUM 40 MG: 40 INJECTION SUBCUTANEOUS at 04:06

## 2024-06-10 RX ADMIN — BUDESONIDE INHALATION 0.5 MG: 0.5 SUSPENSION RESPIRATORY (INHALATION) at 08:06

## 2024-06-10 RX ADMIN — ASPIRIN 81 MG: 81 TABLET, COATED ORAL at 08:06

## 2024-06-10 RX ADMIN — BUDESONIDE INHALATION 0.5 MG: 0.5 SUSPENSION RESPIRATORY (INHALATION) at 07:06

## 2024-06-10 RX ADMIN — ARFORMOTEROL TARTRATE 15 MCG: 15 SOLUTION RESPIRATORY (INHALATION) at 07:06

## 2024-06-10 RX ADMIN — ATORVASTATIN CALCIUM 40 MG: 40 TABLET, FILM COATED ORAL at 09:06

## 2024-06-10 NOTE — PLAN OF CARE
Problem: SLP  Goal: SLP Goal  Description: 1. Provide accurate solutions to hypothetical problems, presented verbally, with MOD I  2. Functional problem solving tasks related to time and money with MOD I  Outcome: Progressing         Elissa Raymond is a 69 y.o. female with an admitting diagnosis of Stroke. Pt seen for clinical swallow and cognitive communication evaluations. Difficulty assessment 2° hearing impairment and visual impairment. However, she presents with possible mild cognitive linguistic impairment. Oropharyngeal swallow judged to be WFL. Will follow for ongoing evaluation and treatment as indicated.

## 2024-06-10 NOTE — PLAN OF CARE
Problem: Stroke, Ischemic (Includes Transient Ischemic Attack)  Goal: Optimal Coping  Outcome: Progressing  Goal: Effective Bowel Elimination  Outcome: Progressing  Goal: Optimal Cerebral Tissue Perfusion  Outcome: Progressing  Goal: Optimal Cognitive Function  Outcome: Progressing  Goal: Improved Communication Skills  Outcome: Progressing  Goal: Optimal Nutrition Intake  Outcome: Progressing  Goal: Effective Oxygenation and Ventilation  Outcome: Progressing

## 2024-06-10 NOTE — NURSING
Nurses Note -- 4 Eyes      6/10/2024   12:10 AM      Skin assessed during: Admit      [x] No Altered Skin Integrity Present    [x]Prevention Measures Documented  Blancable redness on sacrum applied meperlex       [] Yes- Altered Skin Integrity Present or Discovered   [] LDA Added if Not in Epic (Describe Wound)   [] New Altered Skin Integrity was Present on Admit and Documented in LDA   [] Wound Image Taken    Wound Care Consulted? No    Attending Nurse:  Catrina MONROY    Second RN/Staff Member:  YOON MONROY

## 2024-06-10 NOTE — PLAN OF CARE
Problem: Occupational Therapy  Goal: Occupational Therapy Goal  Description: Goals to be met by: 6/30/2024     Patient will increase functional independence with ADLs by performing:    UE Dressing with Supervision.  LE Dressing with Supervision.  Grooming while standing at sink with Supervision.  Toileting from toilet with Supervision for hygiene and clothing management.   Toilet transfer to toilet with Supervision.    Outcome: Progressing

## 2024-06-10 NOTE — CONSULTS
[facility]  Department of Neurology  Neurology Consultation Note        PATIENT NAME: Elissa Raymond  MRN: [id number]  CSN: [id number]      TODAY'S DATE: 06/10/2024  ADMIT DATE: 6/9/2024                            CONSULTING PROVIDER: José Manuel Rosado MD  CONSULT REQUESTED BY: Raheem Ryan Jr., MD      Reason for consult: CVA       History obtained from chart review and the patient.    HPI per EMR: Elissa Raymond is a 69 y.o. female with a history of  Afib currently not on any anticoagulation, prior stroke, and blind both eyes presented to the ER because of lethargy.  Patient recently had a Holter monitor placed 2 days ago on Friday for her h/o afib. Since then, she has been feeling weak and tired.  Apparently she went to sleep yesterday with no neuro deficits.  She woke up today feeling lethargic, and was slow to answer questions.   brought her to the ER for evaluation.     In the ER, vitals showed a blood pressure of 197/88, heart rate of 63, respiratory rate of 22, afebrile, satting 97% on room air.  CBC showed white count of 15. CMP showed her CKD stage 3.  Troponin, and TSH are within normal limits.  CT head negative for any acute intracranial process. CTA negative for large vessel occlusion. MRI brain showed Focal area of suspected diffusion restriction in rightward central chapincito is suspicious for focal ischemia. MRA brain is negative.  Neurology was consulted, patient will be admitted to Medicine further care.    Neurology consult:  Patient was seen and examined by me.  She states that she presented to the hospital for lethargy and generalized weakness after she had her loop recorder placed.  She states that she could not wake up from the bed and felt extremely tired.  She denies any focal weakness on 1 side of the body, denies any facial droop, slurred speech or aphasia, diplopia or vision loss.    She presented to the ER for these symptoms and in the ER her blood pressure was 197/88  on arrival.  CT head was negative for acute intracranial pathology and CT angiogram head and neck was negative for large vessel occlusion.  MRI brain was done in the ER showed a small area of DWI hyperintensity in the right chapincito without clear correlating ADC hypointensity.  Admitted to the hospital for further stroke workup and management.    Currently she states that she feels back to baseline denies any complaints    She has a history of atrial fibrillation however he does not take anticoagulation due to insurance issues.    PREVIOUS MEDICAL HISTORY:  Past Medical History:   Diagnosis Date    A-fib     Blind in both eyes     Hyperlipidemia     Hypertension     Stroke     x 3    Tobacco use 11/02/2017    Unspecified macular degeneration     Unspecified macular degeneration      PREVIOUS SURGICAL HISTORY:  Past Surgical History:   Procedure Laterality Date    ANGIOGRAPHY OF LOWER EXTREMITY N/A 11/01/2023    Procedure: ANGIOGRAM BILATERLA LOWER;  Surgeon: Martin Hair MD;  Location: [facility] CATH/EP LAB;  Service: General;  Laterality: N/A;    AORTIC VALVE REPLACEMENT      AORTOGRAPHY WITH EXTREMITY RUNOFF N/A 11/01/2023    Procedure: AORTOGRAM, WITH EXTREMITY RUNOFF;  Surgeon: Martin Hair MD;  Location: [facility] CATH/EP LAB;  Service: General;  Laterality: N/A;    BLADDER SURGERY      BLADDER SURGERY      Lift.    CATARACT EXTRACTION      CHOLECYSTECTOMY      HYSTERECTOMY      KNEE SURGERY Left     TONSILLECTOMY       FAMILY MEDICAL HISTORY:  Family History   Problem Relation Name Age of Onset    Diabetes Mother      Kidney disease Mother      Glaucoma Mother      Macular degeneration Mother      Cancer Father      Glaucoma Sister      Macular degeneration Sister      Macular degeneration Brother       SOCIAL HISTORY:  Social History     Tobacco Use    Smoking status: Every Day     Current packs/day: 1.00     Average packs/day: 1 pack/day for 46.0 years (46.0 ttl pk-yrs)     Types: Cigarettes    Smokeless  tobacco: Never   Substance Use Topics    Drug use: No     ALLERGIES:  Review of patient's allergies indicates:   Allergen Reactions    Iodinated contrast media Shortness Of Breath    Pcn [penicillins] Other (See Comments)     Unable to obtain     HOME MEDICATIONS:  Prior to Admission medications    Medication Sig Start Date End Date Taking? Authorizing Provider   albuterol (PROVENTIL/VENTOLIN HFA) 90 mcg/actuation inhaler INHALE 2 PUFFS INTO THE LUNGS EVERY 6 HOURS AS NEEDED FOR WHEEZING 4/18/24  Yes Joyce Richardson NP   albuterol-ipratropium (DUO-NEB) 2.5 mg-0.5 mg/3 mL nebulizer solution Take 3 mLs by nebulization every 6 (six) hours as needed for Wheezing. Rescue 11/15/23  Yes Joyce Richardson NP   aspirin (ECOTRIN) 81 MG EC tablet Take 1 tablet (81 mg total) by mouth once daily. 11/15/23 11/14/24 Yes Joyce Richardson NP   atorvastatin (LIPITOR) 40 MG tablet Take 1 tablet (40 mg total) by mouth once daily. 4/1/24 4/1/25 Yes Deepa Alicea, ALMA   budesonide-formoterol 160-4.5 mcg (SYMBICORT) 160-4.5 mcg/actuation HFAA Inhale 2 puffs into the lungs 2 (two) times daily. 3/30/23  Yes Provider, Historical   CAPSICUM, CAYENNE, ORAL Take 1 capsule by mouth Daily.   Yes Provider, Historical   cholecalciferol, vitamin D3, 1,250 mcg (50,000 unit) capsule Take 50,000 Units by mouth. 5/19/21  Yes Provider, Historical   cinnamon bark (CINNAMON) 500 mg capsule Take 500 mg by mouth once daily.   Yes Provider, Historical   ezetimibe (ZETIA) 10 mg tablet Take 1 tablet (10 mg total) by mouth once daily. 11/15/23 11/14/24 Yes Joyce Richardson NP   ginger root (GINGER EXTRACT ORAL) Take 1 capsule by mouth once daily.   Yes Provider, Historical   OXYGEN-AIR DELIVERY SYSTEMS MISC by [facility].(Non-Drug; Combo Route) route. 3 L by nasal route    Provider, Historical   rivaroxaban (XARELTO) 20 mg Tab Take 1 tablet (20 mg total) by mouth daily with dinner or evening meal. 5/24/24 7/23/24  Jose Islas MD     CURRENT SCHEDULED  MEDICATIONS:   arformoteroL  15 mcg Nebulization BID    aspirin  81 mg Oral Daily    atorvastatin  40 mg Oral QHS    budesonide  0.5 mg Nebulization Q12H    enoxparin  40 mg Subcutaneous Daily    ezetimibe  10 mg Oral Daily     CURRENT INFUSIONS:    CURRENT PRN MEDICATIONS:    Current Facility-Administered Medications:     bisacodyL, 10 mg, Rectal, Daily PRN    labetalol, 10 mg, Intravenous, Q15 Min PRN    sodium chloride 0.9%, 500 mL, Intravenous, PRN    sodium chloride 0.9%, 10 mL, Intravenous, PRN    REVIEW OF SYSTEMS:  Please refer to the HPI for all pertinent positive and negative findings. A comprehensive review of all other systems was negative.       PHYSICAL EXAM:  Patient Vitals for the past 24 hrs:   BP Temp Temp src Pulse Resp SpO2 Height Weight   06/10/24 1000 (!) 160/98 -- -- 62 20 95 % -- --   06/10/24 0925 (!) 142/67 -- -- (!) 57 19 95 % -- --   06/10/24 0901 -- -- -- 65 18 98 % -- --   06/10/24 0900 -- -- -- 66 19 95 % -- --   06/10/24 0800 (!) 156/69 -- -- (!) 54 18 95 % -- --   06/10/24 0726 -- -- -- (!) 53 15 (!) 92 % -- --   06/10/24 0701 -- 98.1 °F (36.7 °C) Oral (!) 55 18 95 % -- --   06/10/24 0700 (!) 170/74 -- -- (!) 55 20 98 % -- --   06/10/24 0600 (!) 163/67 -- -- (!) 51 20 (!) 94 % -- --   06/10/24 0500 139/61 -- -- (!) 52 17 (!) 93 % -- --   06/10/24 0400 (!) 156/63 -- -- (!) 51 18 (!) 93 % -- --   06/10/24 0300 (!) 159/70 97.8 °F (36.6 °C) Oral (!) 56 20 96 % -- --   06/10/24 0200 (!) 171/74 -- -- (!) 55 18 (!) 94 % -- --   06/10/24 0100 (!) 152/65 -- -- (!) 57 17 (!) 94 % -- --   06/10/24 0000 137/63 -- -- (!) 58 19 (!) 94 % -- --   06/09/24 2305 (!) 193/78 -- -- (!) 52 19 95 % -- --   06/09/24 2300 -- 98 °F (36.7 °C) Oral -- -- -- -- --   06/09/24 2200 (!) 145/71 -- -- 77 (!) 22 (!) 93 % -- --   06/09/24 2146 -- -- -- 60 18 (!) 92 % -- --   06/09/24 2100 (!) 150/67 -- -- 68 19 95 % -- --   06/09/24 2022 -- 98.1 °F (36.7 °C) -- -- -- -- -- 63 kg (138 lb 14.2 oz)   06/09/24 2017 (!)  "147/67 -- -- -- -- -- -- --   06/09/24 1940 -- -- -- -- -- (!) 93 % -- --   06/09/24 1938 -- -- -- 63 16 (!) 92 % -- --   06/09/24 1937 -- -- -- 62 16 (!) 92 % -- --   06/09/24 1932 (!) 144/64 -- -- 62 (!) 21 (!) 92 % -- --   06/09/24 1610 -- -- -- (!) 58 20 (!) 94 % -- --   06/09/24 1532 (!) 175/74 -- -- 73 -- (!) 93 % -- --   06/09/24 1132 (!) 155/97 -- -- 75 -- (!) 94 % -- --   06/09/24 1117 (!) 141/91 -- -- 72 -- 95 % -- --   06/09/24 1112 -- -- -- -- 17 -- -- --   06/09/24 1040 (!) 197/88 -- -- -- -- -- -- --   06/09/24 1032 (!) 197/88 97.9 °F (36.6 °C) Oral 63 (!) 22 97 % 5' 2" (1.575 m) 62.6 kg (138 lb)       GENERAL APPEARANCE: Alert, well-developed, well-nourished female in no acute distress.  HEENT: Normocephalic and atraumatic. PERRL. Oropharynx unremarkable.  PULM: Normal respiratory effort. No accessory muscle use.  CV: RRR.  ABDOMEN: Soft, nontender.  EXTREMITIES: No obvious signs of vascular compromise. Pulses present. No cyanosis, clubbing or edema.  SKIN: Clear; no rashes, lesions or skin breaks in exposed areas.    NEURO:  MENTAL STATUS: Patient awake and oriented to time, place, and person, recent/remote memory normal, attention span/concentration normal, and speech fluent without paraphasic errors.  Affect euthymic.    CRANIAL NERVES:  CN I: Not tested.  CN II: Fundoscopic exam deferred.  CN III, IV, VI: Pupils equal, round and reactive to light.  Extraocular movements full and intact.  CN V: Facial sensation normal.  CN VII: Facial asymmetry absent.  CN VIII: Hearing grossly normal and equal bilaterally.  No skew deviation or pathologic nystagmus.  CN IX, X: Palate elevates symmetrically. Speech/articulation is clear without dysarthria.  CN XI: Shoulder shrug and chin rotation equal with good strength.  CN XII: Tongue protrusion midline.    MOTOR:  Bulk normal. Tone normal and symmetric throughout.  Abnormal movements absent.  Tremor: none present.  Strength 5/5 throughout.    REFLEXES:  DTRs 2+ " throughout.  Plantar response equivocal bilaterally.  SENSATION: grossly intact throughout.  COORDINATION: normal finger-to-nose.  STATION: not tested.  GAIT: not tested.      NIHSS:  1a      Level of Consciousness (alert, drowsy, etc.):   0=alert; keenly responsive  1b.     Level of Consciousness Questions (month, age): 0= able to answer both questions  1c.     Level of Consciousness Commands (open, close eyes, make fist, let go):  0=Answers both tasks correctly  2.      Best Gaze (eyes open - patient follows examiner's finger or face):      0=normal  3.      Visual (introduce visual stimulus/threat to patient's visual field quadrants):  0=No visual loss  4.      Facial Palsy (show teeth, raise eyebrows and squeeze eyes shut):        0=Normal symmetric movement  5a.     Motor Arm - Left (elevate extremity 90 degrees and score drift/movement):       0=No drift, limb holds 90 (or 45) degrees for full 10 seconds  5b.     Motor Arm - Right (elevate extremity 90 degrees and score drift/movement):      0=No drift, limb holds 90 (or 45) degrees for full 10 seconds  6a.     Motor Leg - Left (elevate extremity 30 degrees and score drift/movement):       0=No drift, limb holds 90 (or 45) degrees for full 10 seconds  6b      Motor Leg - Right (elevate extremity 30 degrees and score drift/movement):      0=No drift, limb holds 90 (or 45) degrees for full 10 seconds  7.      Limb Ataxia (finger-nose, heel down shin):      0=Absent  8.      Sensory (pin prick to face, arm, trunk, and leg - compare side to side):        0=Normal; no sensory loss  9.      Best Language (name items, describe a picture and read sentences):      0=No aphasia, normal  10.     Dysarthria (evaluate speech clarity by patient repeating listed words): 0=Normal  11.     Extinction and Inattention (use prior testing to identify neglect or double simultaneous stimuli testing):      0=No abnormality          NIH Stroke Scale Total:         0      Labs:  Recent  "Labs   Lab 06/09/24  1041 06/10/24  0413    138   K 3.9 4.1    105   CO2 25 26   BUN 21 25*   CREATININE 1.1 1.1   GLU 76 90   CALCIUM 8.5* 8.3*   PHOS  --  4.2   MG  --  2.3     Recent Labs   Lab 06/09/24  1041 06/10/24  0413   WBC 15.00* 13.24*   HGB 15.6 14.3   HCT 47.0 44.2   * 125*     Recent Labs   Lab 06/09/24  1041 06/10/24  0413   ALBUMIN 3.7 3.2*   PROT 6.0 5.4*   BILITOT 0.6 0.4   ALKPHOS 50* 52*   ALT 29 21   AST 15 12     Lab Results   Component Value Date    INR 1.0 06/10/2024     Lab Results   Component Value Date    TRIG 115 06/09/2024    HDL 62 06/09/2024    CHOLHDL 32.1 06/09/2024     Lab Results   Component Value Date    HGBA1C 5.9 08/24/2021     No results found for: "PROTEINCSF", "GLUCCSF", "WBCCSF"    Imaging:  I have reviewed and interpreted the pertinent imaging and lab results.      MRI Brain Without Contrast  Narrative: EXAMINATION:  MRI BRAIN WITHOUT CONTRAST    CLINICAL HISTORY:  Stroke, follow up;    TECHNIQUE:  MRI brain without IV contrast    COMPARISON:  CT 06/09/2024    FINDINGS:  Focal area of increased signal in rightward central chapincito on diffusion sequence may have corresponding low signal on ADC map, but is somewhat nonspecific.  Diffusion-weighted imaging shows no additional diffusion restriction.    Cerebral and cerebellar atrophy is present.  Mild periventricular and subcortical white matter T2 hyperintensities suggest gliosis from chronic small vessel ischemic changes.  In right central chapincito superiorly, old lacunar infarct is present.    Calvarial bone marrow signal is normal.  Sinuses and mastoids are clear.    No intracranial mass effect or midline shift.  Ventricles and cisterns are maintained.  Major intracranial flow voids are unremarkable.  Impression: 1. Focal area of suspected diffusion restriction in rightward central chapincito is suspicious for focal ischemia, although artifactual etiology is conceivable but felt less likely.  Clinical correlation is " needed.  2. Chronic small vessel ischemic changes.  3. Old lacunar infarct in rightward central chapincito superiorly.  This report was flagged in Epic as abnormal.    Electronically signed by: Ozzy Mcbride  Date:    06/09/2024  Time:    13:35  MRA Brain without contrast  Narrative: EXAMINATION:  MRA BRAIN WITHOUT CONTRAST    CLINICAL HISTORY:  Stroke, follow up;    TECHNIQUE:  3-D time-of-flight MR angiography of brain without contrast.    COMPARISON:  CTA 06/09/2024    FINDINGS:  Major intracranial arteries show normal intraluminal flow related signal. Negative for vascular occlusion, focal stenosis, or dissection. Negative for aneurysm or evidence of vasculitis.  Impression: Negative MRA brain    Electronically signed by: Ozzy Mcbride  Date:    06/09/2024  Time:    13:23  X-Ray Chest AP Portable  Narrative: EXAMINATION:  XR CHEST AP PORTABLE    CLINICAL HISTORY:  Sepsis;    FINDINGS:  Portable chest at 11 19 compared with 04/27/2024 shows unchanged cardiomediastinal silhouette. Prior aortic valve replacement evident.  Implantable cardiac monitor projecting over left chest his newly been placed in the interim.    Lungs are clear. Pulmonary vasculature is normal. Old left rib fractures unchanged.  No acute osseous abnormality  Impression: No acute cardiopulmonary abnormality.    Electronically signed by: Ozzy Mcbride  Date:    06/09/2024  Time:    11:56  CTA Head and Neck (xpd)  Narrative: EXAMINATION:  CTA HEAD AND NECK (XPD)    CLINICAL HISTORY:  Neuro deficit, acute, stroke suspected;    TECHNIQUE:  CT angiography of brain and neck with 100 mL Omnipaque 350. Maximum intensity projection coronal reformations were obtained at a separate workstation and stored in the patient's permanent medical record.    COMPARISON:  CT 06/09/2024, MRIs 09/20/2017    FINDINGS:  CTA BRAIN:    VASCULAR FINDINGS:    Mild peripheral calcified plaque affects cavernous segments of bilateral internal carotid arteries without significant  narrowing.    Other major intracranial arteries are patent without atherosclerotic plaque, stenosis, intraluminal filling defect, or dissection.  Negative for aneurysm or evidence of vasculitis.    NONVASCULAR FINDINGS:    No midline shift.  No abnormal intra-axial or extra-axial enhancement.  Trace maxillary sinus mucosal thickening.    CTA NECK:    VASCULAR FINDINGS:    Conventional 3 branch vessel aortic arch anatomy is present.    Left common carotid artery is patent. Noncalcified plaque results in 30% stenosis of proximal left internal carotid artery. Left ECA and branches opacify.  30-40% stenosis affects V2 segment of left vertebral artery and V4 segment, due to atherosclerotic plaque.    Right brachiocephalic artery is patent. Mild noncalcified plaque affects right common carotid artery. Noncalcified plaque in right carotid bulb extends into right internal carotid artery origin resulting in 20% right ICA stenosis.  Predominantly noncalcified plaque of V1 segment right vertebral artery results in 50% stenosis.    NONVASCULAR FINDINGS:    Visualized lung apices are clear.  Cervical soft tissues are unremarkable.  Implantable cardiac monitor left anterior chest wall incidentally noted.  No acute osseous abnormality.  Impression: 1. 30% stenosis of proximal left ICA due to noncalcified plaque.  2. 30-40% stenosis of left vertebral artery V2 and V4 segments.  3. 20% proximal right ICA stenosis due to noncalcified plaque.  4. 50% stenosis of the V1 segment right vertebral artery.  5. Mild peripheral atherosclerotic plaque affecting intracranial internal carotid arteries without stenosis.    Electronically signed by: Ozzy Mcbride  Date:    06/09/2024  Time:    11:40  CT HEAD FOR STROKE  Narrative: EXAMINATION:  CT HEAD FOR STROKE    CLINICAL HISTORY:  Neuro deficit, acute, stroke suspected;    TECHNIQUE:  Head CT without IV contrast.    COMPARISON:  MRI 09/20/2017, CT 09/20/2017    FINDINGS:  Gray-white  differentiation is maintained without hemorrhage, midline shift, or mass effect.    Minor chronic small-vessel ischemic changes affect periventricular white matter bilaterally.    The ventricles and cisterns are maintained.    Calvarium is intact. Trace maxillary sinus mucosal thickening is evident.  Impression: 1. No acute intracranial abnormality.  2. Moderate chronic small vessel ischemic changes.  RESULT NOTIFICATION: These observations were discussed by Dr. Mcbride with, and acknowledged by, Dr. Brand at 11:17    Electronically signed by: Ozzy Mcbride  Date:    06/09/2024  Time:    11:17         ASSESSMENT & PLAN:        Lethargic   Subacute infarct  ?  Atrial fibrillation    Plan:   Presented with generalized weakness and lethargy.  MRI brain showed hyperintensity in the right side of the chapincito without clear ADC correlation.  This could be subacute infarct versus T2 shine through from prior lesion. Etiology:  Small-vessel disease  Recommend anticoagulation with Eliquis or Xarelto along with aspirin 81 mg daily and Lipitor 40 mg nightly.  Check lipid panel, hemoglobin A1c and 2D echo.  PT OT and speech therapy evaluate and treat.    Slowly normalize blood pressure  Workup for metabolic derangements and infectious abnormalities per primary team.    Cardiology following.    Will follow          Thank you kindly for including us in the care of this patient. Please do not hesitate to contact us with any questions.           José Manuel Rosado MD  Neurology/vascular Neurology  Date of Service: 06/10/2024  10:28 AM    --------------------------------------------------------------------------------------------------------------------------------------------------------------------------------------------------------------------------------------------------------------  Please note: This note was transcribed using voice recognition software. Because of this technology there are often uinintended grammatical, spelling,  and other transcription errors. Please disregard these errors.

## 2024-06-10 NOTE — PT/OT/SLP EVAL
Occupational Therapy   Evaluation, tx    Name: Elissa Raymond  MRN: [id number]  Admitting Diagnosis: Stroke  Recent Surgery: * No surgery found *    The primary encounter diagnosis was Ischemic cerebrovascular accident (CVA). Diagnoses of Acute focal neurological deficit, Stroke, and Cerebrovascular accident (CVA) due to embolism of cerebral artery were also pertinent to this visit.    Recommendations:     Discharge Recommendations: Low Intensity Therapy  Discharge Equipment Recommendations:  none  Barriers to discharge:  None    Assessment:     Elissa Raymond is a 69 y.o. female with a medical diagnosis of Stroke.  She presents with Performance deficits affecting function: weakness, impaired self care skills, impaired functional mobility, gait instability, impaired balance, decreased lower extremity function, decreased coordination, decreased safety awareness, visual deficits.      Pt presents with good UE strength and coordination. Ox 4 and follws all command an dspeech wfl.  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 Degeneration and only sees shadow of  images from a distance and wears glasses to see close up images. She ambulated Min A with HHA in room to bathroom 2/2 c/o stiffness in R knee from being in bed 2 days. On return from bathroom, she ambulated with CGA/HHA back to sink outside of bathroom ~10' and then ambulated  SBA with RW in room ~ADDITIONAL 10' without LOB. She performed toileting CGA, g.hygiene CGA standing at sink. LB dressing SBA for socks and bed mobility SBA. Pt would benefit Low Intensity Therapy at discharge because she is not back to her baseline of Indep-mod I. Continue with OT POC.     Rehab Prognosis: Good; patient would benefit from acute skilled OT services to address these deficits and reach maximum level of function.       Plan:     Patient to be seen 5 x/week to address the above listed problems via self-care/home management, therapeutic  exercises, therapeutic activities  Plan of Care Expires: 07/10/24  Plan of Care Reviewed with: patient    Subjective     Chief Complaint: stiffness from being in bed 2 days.  Patient/Family Comments/goals: pt agreeable to OT.    Occupational Profile:  Living Environment: pt lives with spouse in a H with 1 step ; t/s combo with hydraulic bath seat.  Previous level of function: Indep-mod I self care, ambulated Indep without a device in the home and min A in community due to blindness/poor vision from mac degen. Spouse and son assist with community walking for safety; pt does not drive or cook; she does household chores.   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  Patient found HOB elevated with bed alarm, telemetry, pulse ox (continuous), blood pressure cuff, PureWick upon OT entry to room.    General Precautions: Standard, vision impaired, blind, hearing impaired  Orthopedic Precautions: N/A  Braces: N/A  Respiratory Status: Room air    Occupational Performance:    Bed Mobility:    Patient completed Rolling/Turning to Left with  stand by assistance  Patient completed Scooting/Bridging with stand by assistance  Patient completed Supine to Sit with stand by assistance  Patient completed Sit to Supine with stand by assistance    Functional Mobility/Transfers:  Patient completed Sit <> Stand Transfer with contact guard assistance  with  hand-held assist and grab bars(s)   Patient completed Toilet Transfer  Step Transfer technique with contact guard assistance with  hand-held assist  Functional Mobility: ambulated Min A with HHA to bathroom ~10' limping on RLE 2/2 stiffness in R knee. Pt ambulated from bathroom with CGA/HHA to sink outside bathroom ~10' then ambulated additional 10' with SBA with RW in room back to bed. No LOB.     Activities of Daily Living:  Feeding:  independence    Grooming: stand by assistance and contact guard assistance washed hands, brushed teeth standing at sink  Lower Body Dressing: stand by assistance donned/doff socks seated EOB; extra time due to stiffness  Toileting: contact guard assistance for standing clothes management  and balance safety    Cognitive/Visual Perceptual:  Cognitive/Psychosocial Skills:     -       Oriented to: Person, Place, Time, and Situation   -       Follows Commands/attention:Follows one-step commands, Follows two-step commands, and [place]  -       Communication: clear/fluent  -       Memory: No Deficits noted  -       Safety awareness/insight to disability: impaired   -       Mood/Affect/Coping skills/emotional control: Appropriate to situation  Visual/Perceptual:      -Impaired  acuity and chronic blindness L eye , R eye poor /low vision due to mac deg. Pt has glasses she can see close up images and only sees shadows of images for distance vision.  Pt uses her cell phone wearing her glasses    Physical Exam:  Balance:    -       sitting: good  standing: fair plus  dynamic fair  Postural examination/scapula alignment:    -       No postural abnormalities identified  Sensation:    -       Intact  Motor Planning:    -       intact  Dominant hand:    -       right  Upper Extremity Range of Motion:     -       Right Upper Extremity: WFL  -       Left Upper Extremity: WFL  Upper Extremity Strength:    -       Right Upper Extremity: WFL  -       Left Upper Extremity: WFL   Strength:    -       Right Upper Extremity: WFL  -       Left Upper Extremity: WFL  Fine Motor  Coordination:    -       Intact  Gross motor coordination:   WFL  Neurological:    -       normal tone    AMPAC 6 Click ADL:  AMPAC Total Score: 19    Treatment & Education:  Purpose of PT and POC  Safe Self care and fx mobility retraining as stated above.  All questions/concerns addressed within scope.   Call don't fall and lance be use explained. Pt acknowledged and demonstrated use.      Patient left HOB elevated with all lines intact, call button in reach, and bed alarm on    GOALS:   Multidisciplinary Problems       Occupational Therapy Goals          Problem: Occupational Therapy    Goal Priority Disciplines Outcome Interventions   Occupational Therapy Goal     OT, PT/OT Progressing    Description: Goals to be met by: 6/30/2024     Patient will increase functional independence with ADLs by performing:    UE Dressing with Supervision.  LE Dressing with Supervision.  Grooming while standing at sink with Supervision.  Toileting from toilet with Supervision for hygiene and clothing management.   Toilet transfer to toilet with Supervision.                         History:     Past Medical History:   Diagnosis Date    A-fib     Blind in both eyes     Hyperlipidemia     Hypertension     Stroke     x 3    Tobacco use 11/02/2017    Unspecified macular degeneration     Unspecified macular degeneration          Past Surgical History:   Procedure Laterality Date    ANGIOGRAPHY OF LOWER EXTREMITY N/A 11/01/2023    Procedure: ANGIOGRAM BILATERLA LOWER;  Surgeon: Martin Hair MD;  Location: [facility] CATH/EP LAB;  Service: General;  Laterality: N/A;    AORTIC VALVE REPLACEMENT      AORTOGRAPHY WITH EXTREMITY RUNOFF N/A 11/01/2023    Procedure: AORTOGRAM, WITH EXTREMITY RUNOFF;  Surgeon: Martin Hair MD;  Location: [facility] CATH/EP LAB;  Service: General;  Laterality: N/A;    BLADDER SURGERY      BLADDER SURGERY      Lift.    CATARACT EXTRACTION      CHOLECYSTECTOMY      HYSTERECTOMY      KNEE SURGERY Left     TONSILLECTOMY          Time Tracking:     OT Date of Treatment: 06/10/24  OT Start Time: 1511  OT Stop Time: 1538  OT Total Time (min): 27 min    Billable Minutes:Evaluation 10  Self Care/Home Management 17  Total Time 27    6/10/2024

## 2024-06-10 NOTE — PLAN OF CARE
Met with patient at bedside to complete initial assessment. Patient / family reports patient DOES NOT have a living will and NO ONE is medical POA.     [facility]  Initial Discharge Assessment       Primary Care Provider: Scar Hussein MD    Admission Diagnosis: Ischemic cerebrovascular accident (CVA) [I63.9]    Admission Date: 6/9/2024  Expected Discharge Date: 6/12/2024    Transition of Care Barriers: None    Payor: HUMANA MANAGED MEDICARE / Plan: HU[employer]NA MEDICARE PPO / Product Type: Medicare Advantage /     Extended Emergency Contact Information  Primary Emergency Contact: Arpit Blake  Address: [address] [place]  [address]           [place] [address] [facility]  Home Phone: [phone]  Mobile Phone: [phone]  Relation: Spouse    Discharge Plan A: Home with family  Discharge Plan B: Home with family      [id number] - [place], [place] - [address] AT [facility] [address]  [address]  [place] [address]  Phone: [phone] Fax: [phone]    [facility] - [place], [place] - [address]  [address]  [place] [address]  Phone: [phone] Fax: [phone]      Initial Assessment (most recent)       Adult Discharge Assessment - 06/10/24 1521          Discharge Assessment    Assessment Type Discharge Planning Assessment     Confirmed/corrected address, phone number and insurance Yes     Confirmed Demographics Correct on Facesheet     Source of Information patient     Communicated CELESTE with patient/caregiver No     Reason For Admission stroke     People in Home spouse     Facility Arrived From: home     Do you expect to return to your current living situation? Yes     Do you have help at home or someone to help you manage your care at home? Yes     Who are your caregiver(s) and their phone number(s)? Arpit Blake (Spouse)  [phone] (Mobile)     Current cognitive status: Alert/Oriented     Walking or Climbing  Stairs Difficulty no     Dressing/Bathing Difficulty no     Equipment Currently Used at Home oxygen;walker, rolling     Readmission within 30 days? Yes     Patient currently being followed by outpatient case management? No     Do you currently have service(s) that help you manage your care at home? No     Do you have prescription coverage? Yes     [place]     Who is going to help you get home at discharge? Arpit Blake (Spouse)  [phone] (Mobile)     How do you get to doctors appointments? family or friend will provide     Are you on dialysis? No     Do you take coumadin? No     Discharge Plan A Home with family     Discharge Plan B Home with family     DME Needed Upon Discharge  none     Discharge Plan discussed with: Patient     Transition of Care Barriers None        OTHER    Name(s) of People in Home Arpit Blake (Spouse)  [phone] (Mobile)

## 2024-06-10 NOTE — PROGRESS NOTES
"[facility]  Adult Nutrition   Consult Note (Nutrition Education)     SUMMARY     Recommendations  1) Continue current cardiac diet as tolerated.   2) RD to provide pt with additional healthy heart diet instruction due to having another stroke.    Goals:   1) Pt to continue to meet 75% or more of her EEN/EPN.   2) Pt to understand the healthy heart diet and be able to avoid another stroke.    Nutrition Goal Status: progressing towards goal    Communication of RD Recs: other (comment)    Nutrition Diagnosis PES Statement: Food- and nutrition-related knowledge deficit related to use for new education as evidenced by consult for stroke pathway and need for cardiac diet instruction.    Dietitian Rounds Brief  Consult for stroke pathway: Saw pt today along with her  and provided them with the healthy heart diet information which pt states she has had before since this is not her first stroke. Her diet was also just advanced and she gave me her request for lunch which has been implemented in dietary. Her skin appears to be intact and will follow prn.    Nutrition Related Social Determinants of Health: SDOH: None Identified    Diet order:   Current Diet Order: Cardiac      Evaluation of Received Nutrient/Fluid Intake  Energy Calories Required: not meeting needs  Protein Required: not meeting needs  Fluid Required: meeting needs  Tolerance: tolerating     % Intake of Estimated Energy Needs: 0%  % Meal Intake: NPO      Intake/Output Summary (Last 24 hours) at 6/10/2024 0953  Last data filed at 6/10/2024 0615  Gross per 24 hour   Intake 150 ml   Output 250 ml   Net -100 ml        Anthropometrics  Temp: 98.1 °F (36.7 °C)  Height Method: Stated  Height: 5' 2" (157.5 cm)  Height (inches): 62 in  Weight Method: Bed Scale  Weight: 63 kg (138 lb 14.2 oz)  Weight (lb): 138.89 lb  Ideal Body Weight (IBW), Female: 110 lb  % Ideal Body Weight, Female (lb): 125.45 %  BMI (Calculated): 25.4  BMI Grade: 25 - 29.9 - " overweight       Estimated/Assessed Needs  Weight Used For Calorie Calculations: 63 kg (138 lb 14.2 oz)  Energy Calorie Requirements (kcal): 4784-3725 kcals/day (25-30 kcals/kg ABW)  Energy Need Method: Kcal/kg  Protein Requirements: 76-95 g/day (1.2-1.5 g/kg ABW)  Weight Used For Protein Calculations: 63 kg (138 lb 14.2 oz)     Estimated Fluid Requirement Method: RDA Method  RDA Method (mL): 1575       Reason for Assessment  Reason For Assessment: consult, identified at risk by screening criteria (MST of 3 and stroke pathway)  Diagnosis: stroke/CVA  Relevant Medical History: Hypertension, Hyperlipidemia, Stroke x 3, Tobacco use, Unspecified macular degeneration, A-fib, Unspecified macular degeneration, Blind in both eyes  Interdisciplinary Rounds: did not attend  Nutrition Discharge Planning: Cardiac    Nutrition/Diet History  Spiritual, Cultural Beliefs, Jehovah's witness Practices, Values that Affect Care: no  Food Allergies: NKFA  Factors Affecting Nutritional Intake: None identified at this time    Nutrition Risk Screen  Nutrition Risk Screen: no indicators present     MST Score: 3  Have you recently lost weight without trying?: Unsure  Weight loss score: 2  Have you been eating poorly because of a decreased appetite?: Yes  Appetite score: 1       Weight History:  Wt Readings from Last 10 Encounters:   06/09/24 63 kg (138 lb 14.2 oz)   06/07/24 62.6 kg (138 lb)   06/03/24 60.8 kg (134 lb)   05/24/24 61 kg (134 lb 7.7 oz)   04/27/24 63 kg (139 lb)   04/05/24 63.1 kg (139 lb 1.8 oz)   04/01/24 64 kg (140 lb 15.8 oz)   02/21/24 64 kg (141 lb)   11/15/23 64 kg (141 lb)   11/01/23 64.9 kg (143 lb)        Lab/Procedures/Meds: Pertinent Labs/Meds Reviewed    Medications:Pertinent Medications Reviewed  Scheduled Meds:   arformoteroL  15 mcg Nebulization BID    aspirin  81 mg Oral Daily    atorvastatin  40 mg Oral QHS    budesonide  0.5 mg Nebulization Q12H    enoxparin  40 mg Subcutaneous Daily    ezetimibe  10 mg Oral Daily      Continuous Infusions:  PRN Meds:.  Current Facility-Administered Medications:     bisacodyL, 10 mg, Rectal, Daily PRN    labetalol, 10 mg, Intravenous, Q15 Min PRN    sodium chloride 0.9%, 500 mL, Intravenous, PRN    sodium chloride 0.9%, 10 mL, Intravenous, PRN    Labs: Pertinent Labs Reviewed  Clinical Chemistry:  Recent Labs   Lab 06/09/24  1041 06/10/24  0413    138   K 3.9 4.1    105   CO2 25 26   GLU 76 90   BUN 21 25*   CREATININE 1.1 1.1   CALCIUM 8.5* 8.3*   PROT 6.0 5.4*   ALBUMIN 3.7 3.2*   BILITOT 0.6 0.4   ALKPHOS 50* 52*   AST 15 12   ALT 29 21   ANIONGAP 6* 7*   MG  --  2.3   PHOS  --  4.2     CBC:   Recent Labs   Lab 06/10/24  0413   WBC 13.24*   RBC 4.69   HGB 14.3   HCT 44.2   *   MCV 94   MCH 30.5   MCHC 32.4     Lipid Panel:  Recent Labs   Lab 06/09/24  1041   CHOL 193   HDL 62   LDLCALC 108.0   TRIG 115   CHOLHDL 32.1       Thyroid & Parathyroid:  Recent Labs   Lab 06/09/24  1041   TSH 2.715       Monitor and Evaluation  Food and Nutrient Intake: food and beverage intake, energy intake  Food and Nutrient Adminstration: diet order  Knowledge/Beliefs/Attitudes: food and nutrition knowledge/skill, beliefs and attitudes  Physical Activity and Function: nutrition-related ADLs and IADLs, factors affecting access to physical activity  Anthropometric Measurements: weight, weight change, body mass index  Biochemical Data, Medical Tests and Procedures: lipid profile, inflammatory profile, glucose/endocrine profile, gastrointestinal profile, electrolyte and renal panel  Nutrition-Focused Physical Findings: overall appearance     Nutrition Risk  Level of Risk/Frequency of Follow-up: moderate     Nutrition Follow-Up  RD Follow-up?: Yes      Mary Jane Puckett, GILA 06/10/2024 9:53 AM

## 2024-06-10 NOTE — PT/OT/SLP PROGRESS
Physical Therapy      Patient Name:  Elissa Raymond   MRN:  [id number]    Patient not seen today secondary to first attempt patient just received breakfast tray (09:43), second attempt patient having an echo, third attempt patient working with SLP. Will follow-up 06/11/24.

## 2024-06-10 NOTE — CONSULTS
Educated pt on heart healthy diet. Education focused on including healthy fats- gave examples and lowering LDL levels by excluding saturated/trans fat in the diet. Went over types of foods that have saturated/trans fat. Encouraged cooking with olive oil instead of butter. Choosing whole grains over refined grains, choosing canned foods with no salt added and plan frozen veggies instead of veggies cooked in butter and cream sauces. Encouraged patient to choose leaner cuts of meat and decreasing high fat meats such as mejia, sausage, hot dogs, etc. Educated pt on decreasing sodium in diet. To try not to cook with salt and use herbs and spices to make food more flavorful. To limit eating out-if patient chooses to eat out, informed patient to discuss with  to cook meats and veggies with no salt and olive oil instead of butter. Provided handouts on all of these topics for pt to use in the future. Also provided RD contact information for pt to call with future questions.      Pt also had diet education at a previous visit per [employer].

## 2024-06-10 NOTE — PLAN OF CARE
Problem: Stroke, Ischemic (Includes Transient Ischemic Attack)  Goal: Optimal Coping  Outcome: Progressing  Goal: Effective Bowel Elimination  Outcome: Progressing  Goal: Optimal Cerebral Tissue Perfusion  Outcome: Progressing  Goal: Optimal Cognitive Function  Outcome: Progressing  Goal: Improved Communication Skills  Outcome: Progressing  Goal: Optimal Functional Ability  Outcome: Progressing  Goal: Optimal Nutrition Intake  Outcome: Progressing  Goal: Effective Oxygenation and Ventilation  Outcome: Progressing  Goal: Improved Sensorimotor Function  Outcome: Progressing  Goal: Safe and Effective Swallow  Outcome: Progressing  Goal: Effective Urinary Elimination  Outcome: Progressing     Problem: Fall Injury Risk  Goal: Absence of Fall and Fall-Related Injury  Outcome: Progressing     Problem: Adult Inpatient Plan of Care  Goal: Plan of Care Review  Outcome: Progressing  Goal: Patient-Specific Goal (Individualized)  Outcome: Progressing  Goal: Absence of Hospital-Acquired Illness or Injury  Outcome: Progressing  Goal: Optimal Comfort and Wellbeing  Outcome: Progressing  Goal: Readiness for Transition of Care  Outcome: Progressing     Problem: Infection  Goal: Absence of Infection Signs and Symptoms  Outcome: Progressing

## 2024-06-10 NOTE — CONSULTS
"[facility]  Department of Cardiology  Consult Note      PATIENT NAME: Elissa Raymond  MRN: [id number]  TODAY'S DATE: 06/10/2024  ADMIT DATE: 6/9/2024                          CONSULT REQUESTED BY: Raheem Ryan Jr., MD    SUBJECTIVE     PRINCIPAL PROBLEM: Stroke      REASON FOR CONSULT:  "Stroke, PAF not on AC"      HPI:  Pt is 69 female with PMH TAVR (2/22) Stroke (x3), HTN, HLD, blind  "who presents to the emergency department for evaluation of confusion upon waking up this morning sometime around 7:30 a.m..  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  She complains of a slight headache since yesterday.  Also complains of epigastric/substernal discomfort today.   reports that she is thinking and talking very slow today which is abnormal.  Patient feels like she is coming out of anesthesia in is sedated is how she describes it.  Patient has a history of TAVR bioprosthetic heart valve and stopped taking her Xarelto because she read somewhere that it should not be taken with a prosthetic heart valve"  Review of patient's allergies indicates:   Allergen Reactions    Iodinated contrast media Shortness Of Breath    Pcn [penicillins] Other (See Comments)     Unable to obtain       Past Medical History:   Diagnosis Date    A-fib     Blind in both eyes     Hyperlipidemia     Hypertension     Stroke     x 3    Tobacco use 11/02/2017    Unspecified macular degeneration     Unspecified macular degeneration      Past Surgical History:   Procedure Laterality Date    ANGIOGRAPHY OF LOWER EXTREMITY N/A 11/01/2023    Procedure: ANGIOGRAM BILATERLA LOWER;  Surgeon: Martin Hair MD;  Location: [facility] CATH/EP LAB;  Service: General;  Laterality: N/A;    AORTIC VALVE REPLACEMENT      AORTOGRAPHY WITH EXTREMITY RUNOFF N/A 11/01/2023    Procedure: AORTOGRAM, WITH " EXTREMITY RUNOFF;  Surgeon: Martin Hair MD;  Location: [facility] CATH/EP LAB;  Service: General;  Laterality: N/A;    BLADDER SURGERY      BLADDER SURGERY      Lift.    CATARACT EXTRACTION      CHOLECYSTECTOMY      HYSTERECTOMY      KNEE SURGERY Left     TONSILLECTOMY       Social History     Tobacco Use    Smoking status: Every Day     Current packs/day: 1.00     Average packs/day: 1 pack/day for 46.0 years (46.0 ttl pk-yrs)     Types: Cigarettes    Smokeless tobacco: Never   Substance Use Topics    Drug use: No        REVIEW OF SYSTEMS  Negative except as mentioned in HPI    OBJECTIVE     VITAL SIGNS (Most Recent)  Temp: 98.1 °F (36.7 °C) (06/10/24 0701)  Pulse: (!) 53 (06/10/24 0726)  Resp: 15 (06/10/24 0726)  BP: (!) 170/74 (06/10/24 0700)  SpO2: (!) 92 % (06/10/24 0726)    VENTILATION STATUS  Resp: 15 (06/10/24 0726)  SpO2: (!) 92 % (06/10/24 0726)           I & O (Last 24H):  Intake/Output Summary (Last 24 hours) at 6/10/2024 0934  Last data filed at 6/10/2024 0615  Gross per 24 hour   Intake 150 ml   Output 250 ml   Net -100 ml       WEIGHTS  Wt Readings from Last 3 Encounters:   06/09/24 2022 63 kg (138 lb 14.2 oz)   06/09/24 1032 62.6 kg (138 lb)   06/07/24 0820 62.6 kg (138 lb)   06/03/24 1622 60.8 kg (134 lb)       PHYSICAL EXAM    GENERAL:  Pleasant, elderly female resting in bed in no apparent distress.  HEENT: Normocephalic.  Patient was blind  NECK: No JVD.   CARDIAC: Regular rate and rhythm. S1 is normal.S2 is normal.+ murmur  CHEST ANATOMY: normal.  Loop recorder device chest wall  LUNGS: Clear to auscultation. No wheezing or rhonchi..   ABDOMEN: Soft . Normal bowel sounds.    EXTREMITIES: No edema  CENTRAL NERVOUS SYSTEM: AAO x 3  SKIN: No rash     HOME MEDICATIONS:  No current facility-administered medications on file prior to encounter.     Current Outpatient Medications on File Prior to Encounter   Medication Sig Dispense Refill    albuterol (PROVENTIL/VENTOLIN HFA) 90 mcg/actuation inhaler  INHALE 2 PUFFS INTO THE LUNGS EVERY 6 HOURS AS NEEDED FOR WHEEZING 18 g 0    albuterol-ipratropium (DUO-NEB) 2.5 mg-0.5 mg/3 mL nebulizer solution Take 3 mLs by nebulization every 6 (six) hours as needed for Wheezing. Rescue 75 mL 3    aspirin (ECOTRIN) 81 MG EC tablet Take 1 tablet (81 mg total) by mouth once daily. 90 tablet 3    atorvastatin (LIPITOR) 40 MG tablet Take 1 tablet (40 mg total) by mouth once daily. 90 tablet 0    budesonide-formoterol 160-4.5 mcg (SYMBICORT) 160-4.5 mcg/actuation HFAA Inhale 2 puffs into the lungs 2 (two) times daily.      CAPSICUM, CAYENNE, ORAL Take 1 capsule by mouth Daily.      cholecalciferol, vitamin D3, 1,250 mcg (50,000 unit) capsule Take 50,000 Units by mouth.      cinnamon bark (CINNAMON) 500 mg capsule Take 500 mg by mouth once daily.      ezetimibe (ZETIA) 10 mg tablet Take 1 tablet (10 mg total) by mouth once daily. 90 tablet 3    ginger root (GINGER EXTRACT ORAL) Take 1 capsule by mouth once daily.      OXYGEN-AIR DELIVERY SYSTEMS MISC [place] [facility] [place]Non-Drug; Combo Route) route. 3 L by nasal route      rivaroxaban (XARELTO) 20 mg Tab Take 1 tablet (20 mg total) by mouth daily with dinner or evening meal. 60 tablet 0       SCHEDULED MEDS:   arformoteroL  15 mcg Nebulization BID    aspirin  81 mg Oral Daily    atorvastatin  40 mg Oral QHS    budesonide  0.5 mg Nebulization Q12H    enoxparin  40 mg Subcutaneous Daily    ezetimibe  10 mg Oral Daily       CONTINUOUS INFUSIONS:    PRN MEDS:  Current Facility-Administered Medications:     bisacodyL, 10 mg, Rectal, Daily PRN    labetalol, 10 mg, Intravenous, Q15 Min PRN    sodium chloride 0.9%, 500 mL, Intravenous, PRN    sodium chloride 0.9%, 10 mL, Intravenous, PRN    LABS AND DIAGNOSTICS     CBC LAST 3 DAYS  Recent Labs   Lab 06/09/24  1041 06/10/24  0413   WBC 15.00* 13.24*   RBC 5.12 4.69   HGB 15.6 14.3   HCT 47.0 44.2   MCV 92 94   MCH 30.5 30.5   MCHC 33.2 32.4   RDW 13.9 13.9   * 125*   MPV 11.4 12.4   GRAN 63.1   "9.5* 59.4  7.9*   LYMPH 25.9  3.9 28.2  3.7   MONO 6.9  1.0 6.4  0.9   BASO 0.03 0.03   NRBC 0 0       COAGULATION LAST 3 DAYS  Recent Labs   Lab 06/09/24  1041 06/10/24  0413   INR 1.0 1.0   APTT  --  30.0       CHEMISTRY LAST 3 DAYS  Recent Labs   Lab 06/09/24  1041 06/10/24  0413    138   K 3.9 4.1    105   CO2 25 26   ANIONGAP 6* 7*   BUN 21 25*   CREATININE 1.1 1.1   GLU 76 90   CALCIUM 8.5* 8.3*   MG  --  2.3   ALBUMIN 3.7 3.2*   PROT 6.0 5.4*   ALKPHOS 50* 52*   ALT 29 21   AST 15 12   BILITOT 0.6 0.4       CARDIAC PROFILE LAST 3 DAYS  No results for input(s): "BNP", "CPK", "CPKMB", "LDH", "TROPONINI" in the last 168 hours.    ENDOCRINE LAST 3 DAYS  Recent Labs   Lab 06/09/24  1041   TSH 2.715       LAST ARTERIAL BLOOD GAS  ABG  No results for input(s): "PH", "PO2", "PCO2", "HCO3", "BE" in the last 168 hours.    LAST 7 DAYS MICROBIOLOGY   Microbiology Results (last 7 days)       Procedure Component Value Units Date/Time    Blood culture x two cultures. Draw prior to antibiotics. [[id number]] Collected: 06/09/24 1335    Order Status: Completed Specimen: Blood from Peripheral, Antecubital, Right Updated: 06/09/24 1958     Blood Culture, Routine No Growth to date    Narrative:      Aerobic and anaerobic  Collection has been rescheduled by HRA at 06/09/2024 12:55 Reason:   Patient unavailable eric  Collection has been rescheduled by HRA at 06/09/2024 12:55 Reason:   Patient unavailable eric    Blood culture x two cultures. Draw prior to antibiotics. [[id number]] Collected: 06/09/24 1337    Order Status: Completed Specimen: Blood from Peripheral, Hand, Right Updated: 06/09/24 1958     Blood Culture, Routine No Growth to date    Narrative:      Aerobic and anaerobic  Collection has been rescheduled by HRA at 06/09/2024 12:55 Reason:   Patient unavailable eric  Collection has been rescheduled by HRA at 06/09/2024 12:55 Reason:   Patient unavailable eric            MOST RECENT IMAGING  MRI Brain Without " Contrast  Narrative: EXAMINATION:  MRI BRAIN WITHOUT CONTRAST    CLINICAL HISTORY:  Stroke, follow up;    TECHNIQUE:  MRI brain without IV contrast    COMPARISON:  CT 06/09/2024    FINDINGS:  Focal area of increased signal in rightward central chapincito on diffusion sequence may have corresponding low signal on ADC map, but is somewhat nonspecific.  Diffusion-weighted imaging shows no additional diffusion restriction.    Cerebral and cerebellar atrophy is present.  Mild periventricular and subcortical white matter T2 hyperintensities suggest gliosis from chronic small vessel ischemic changes.  In right central chapincito superiorly, old lacunar infarct is present.    Calvarial bone marrow signal is normal.  Sinuses and mastoids are clear.    No intracranial mass effect or midline shift.  Ventricles and cisterns are maintained.  Major intracranial flow voids are unremarkable.  Impression: 1. Focal area of suspected diffusion restriction in rightward central chapincito is suspicious for focal ischemia, although artifactual etiology is conceivable but felt less likely.  Clinical correlation is needed.  2. Chronic small vessel ischemic changes.  3. Old lacunar infarct in rightward central chapincito superiorly.  This report was flagged in Epic as abnormal.    Electronically signed by: Ozzy Mcbride  Date:    06/09/2024  Time:    13:35  MRA Brain without contrast  Narrative: EXAMINATION:  MRA BRAIN WITHOUT CONTRAST    CLINICAL HISTORY:  Stroke, follow up;    TECHNIQUE:  3-D time-of-flight MR angiography of brain without contrast.    COMPARISON:  CTA 06/09/2024    FINDINGS:  Major intracranial arteries show normal intraluminal flow related signal. Negative for vascular occlusion, focal stenosis, or dissection. Negative for aneurysm or evidence of vasculitis.  Impression: Negative MRA brain    Electronically signed by: Ozzy Mcbride  Date:    06/09/2024  Time:    13:23  X-Ray Chest AP Portable  Narrative: EXAMINATION:  XR CHEST AP  PORTABLE    CLINICAL HISTORY:  Sepsis;    FINDINGS:  Portable chest at 11 19 compared with 04/27/2024 shows unchanged cardiomediastinal silhouette. Prior aortic valve replacement evident.  Implantable cardiac monitor projecting over left chest his newly been placed in the interim.    Lungs are clear. Pulmonary vasculature is normal. Old left rib fractures unchanged.  No acute osseous abnormality  Impression: No acute cardiopulmonary abnormality.    Electronically signed by: Ozzy Mcbride  Date:    06/09/2024  Time:    11:56  CTA Head and Neck (xpd)  Narrative: EXAMINATION:  CTA HEAD AND NECK (XPD)    CLINICAL HISTORY:  Neuro deficit, acute, stroke suspected;    TECHNIQUE:  CT angiography of brain and neck with 100 mL Omnipaque 350. Maximum intensity projection coronal reformations were obtained at a separate workstation and stored in the patient's permanent medical record.    COMPARISON:  CT 06/09/2024, MRIs 09/20/2017    FINDINGS:  CTA BRAIN:    VASCULAR FINDINGS:    Mild peripheral calcified plaque affects cavernous segments of bilateral internal carotid arteries without significant narrowing.    Other major intracranial arteries are patent without atherosclerotic plaque, stenosis, intraluminal filling defect, or dissection.  Negative for aneurysm or evidence of vasculitis.    NONVASCULAR FINDINGS:    No midline shift.  No abnormal intra-axial or extra-axial enhancement.  Trace maxillary sinus mucosal thickening.    CTA NECK:    VASCULAR FINDINGS:    Conventional 3 branch vessel aortic arch anatomy is present.    Left common carotid artery is patent. Noncalcified plaque results in 30% stenosis of proximal left internal carotid artery. Left ECA and branches opacify.  30-40% stenosis affects V2 segment of left vertebral artery and V4 segment, due to atherosclerotic plaque.    Right brachiocephalic artery is patent. Mild noncalcified plaque affects right common carotid artery. Noncalcified plaque in right carotid bulb  extends into right internal carotid artery origin resulting in 20% right ICA stenosis.  Predominantly noncalcified plaque of V1 segment right vertebral artery results in 50% stenosis.    NONVASCULAR FINDINGS:    Visualized lung apices are clear.  Cervical soft tissues are unremarkable.  Implantable cardiac monitor left anterior chest wall incidentally noted.  No acute osseous abnormality.  Impression: 1. 30% stenosis of proximal left ICA due to noncalcified plaque.  2. 30-40% stenosis of left vertebral artery V2 and V4 segments.  3. 20% proximal right ICA stenosis due to noncalcified plaque.  4. 50% stenosis of the V1 segment right vertebral artery.  5. Mild peripheral atherosclerotic plaque affecting intracranial internal carotid arteries without stenosis.    Electronically signed by: Ozzy Mcbride  Date:    06/09/2024  Time:    11:40  CT HEAD FOR STROKE  Narrative: EXAMINATION:  CT HEAD FOR STROKE    CLINICAL HISTORY:  Neuro deficit, acute, stroke suspected;    TECHNIQUE:  Head CT without IV contrast.    COMPARISON:  MRI 09/20/2017, CT 09/20/2017    FINDINGS:  Gray-white differentiation is maintained without hemorrhage, midline shift, or mass effect.    Minor chronic small-vessel ischemic changes affect periventricular white matter bilaterally.    The ventricles and cisterns are maintained.    Calvarium is intact. Trace maxillary sinus mucosal thickening is evident.  Impression: 1. No acute intracranial abnormality.  2. Moderate chronic small vessel ischemic changes.  RESULT NOTIFICATION: These observations were discussed by Dr. Mcbride with, and acknowledged by, Dr. Brand at 11:17    Electronically signed by: Ozzy Mcbride  Date:    06/09/2024  Time:    11:17      ECHOCARDIOGRAM RESULTS (last 5)  No results found for this or any previous visit.      CURRENT/PREVIOUS VISIT EKG  Results for orders placed or performed during the hospital encounter of 06/09/24   ECG 12 lead    Collection Time: 06/09/24 10:00 AM    Result Value Ref Range    QRS Duration 62 ms    OHS QTC Calculation 429 ms    Narrative    Test Reason : R29.818,    Vent. Rate : 063 BPM     Atrial Rate : 063 BPM     P-R Int : 138 ms          QRS Dur : 062 ms      QT Int : 420 ms       P-R-T Axes : 023 069 071 degrees     QTc Int : 429 ms    Normal sinus rhythm  Normal ECG  When compared with ECG of 05-APR-2024 10:37,  T wave inversion no longer evident in Inferior leads  T wave inversion no longer evident in Lateral leads    Referred By: [email or web address]   SELF           Confirmed By:            ASSESSMENT/PLAN:     Active Hospital Problems    Diagnosis    *Stroke    A-fib    Leukocytosis    Accelerated hypertension    S/p TAVR (transcatheter aortic valve replacement), bioprosthetic       ASSESSMENT & PLAN:   Recurrent embolic Strokes   HTN  H/O TAVR 2022  Loop recorder device    RECOMMENDATIONS:  Obtain loop recorder device interrogation  EKG w/o ischemic changes; Telemetry showed no events or PAF  Patient needs to be started on anticoagulation w/ Xarelto or Eliquis; pt states she could not afford either of these medications. Consulted  to help with the affordability of either Xarelto or Eliquis for long-term anticoagulation for repeat CVA  Obtained echocardiogram- results pending  Neurology consulted, await recommendations    Deepa Peters NP  [facility]  Department of Cardiology  Date of Service: 06/10/2024

## 2024-06-10 NOTE — SUBJECTIVE & OBJECTIVE
Interval History:  No acute events overnight.  Patient states she is feeling little bit better today.  Pending further stroke workup.    Review of Systems  Objective:     Vital Signs (Most Recent):  Temp: 98.1 °F (36.7 °C) (06/10/24 0701)  Pulse: 62 (06/10/24 1000)  Resp: 20 (06/10/24 1000)  BP: (!) 160/98 (06/10/24 1000)  SpO2: 95 % (06/10/24 1000) Vital Signs (24h Range):  Temp:  [97.8 °F (36.6 °C)-98.1 °F (36.7 °C)] 98.1 °F (36.7 °C)  Pulse:  [51-77] 62  Resp:  [15-22] 20  SpO2:  [92 %-98 %] 95 %  BP: (137-193)/(61-98) 160/98     Weight: 63 kg (138 lb 14.2 oz)  Body mass index is 25.4 kg/m².    Intake/Output Summary (Last 24 hours) at 6/10/2024 1130  Last data filed at 6/10/2024 0615  Gross per 24 hour   Intake 150 ml   Output 250 ml   Net -100 ml         Physical Exam  Vitals reviewed.   Constitutional:       General: She is not in acute distress.     Appearance: Normal appearance.   HENT:      Head: Normocephalic and atraumatic.      Right Ear: External ear normal.      Left Ear: External ear normal.      Nose: Nose normal.      Mouth/Throat:      Mouth: Mucous membranes are moist.      Pharynx: Oropharynx is clear.   Eyes:      Extraocular Movements: Extraocular movements intact.      Conjunctiva/sclera: Conjunctivae normal.   Cardiovascular:      Rate and Rhythm: Normal rate and regular rhythm.      Pulses: Normal pulses.      Heart sounds: Normal heart sounds. No murmur heard.     No gallop.   Pulmonary:      Effort: Pulmonary effort is normal. No respiratory distress.      Breath sounds: Normal breath sounds. No wheezing or rales.   Abdominal:      General: Abdomen is flat. There is no distension.      Palpations: Abdomen is soft.      Tenderness: There is no abdominal tenderness. There is no guarding.   Musculoskeletal:         General: No swelling. Normal range of motion.      Cervical back: Normal range of motion and neck supple.   Skin:     General: Skin is warm and dry.   Neurological:      General: No  focal deficit present.      Mental Status: She is alert and oriented to person, place, and time.             Significant Labs: All pertinent labs within the past 24 hours have been reviewed.    Significant Imaging: I have reviewed all pertinent imaging results/findings within the past 24 hours.

## 2024-06-10 NOTE — PROGRESS NOTES
[facility] Medicine  Progress Note    Patient Name: Elissa Raymond  MRN: [id number]  Patient Class: IP- Inpatient   Admission Date: 6/9/2024  Length of Stay: 1 days  Attending Physician: Raheem Ryan Jr., MD  Primary Care Provider: Scar Hussein MD        Subjective:     Principal Problem:Stroke        HPI:  69-year-old history of Afib currently not on any anticoagulation, prior stroke, and blind both eyes presented to the ER because of lethargy.  Patient recently had a Holter monitor placed 2 days ago on Friday for her h/o afib. Since then, she has been feeling weak and tired.  Apparently she went to sleep yesterday with no neuro deficits.  She woke up today feeling lethargic, and was slow to answer questions.   brought her to the ER for evaluation.    In the ER, vitals showed a blood pressure of 197/88, heart rate of 63, respiratory rate of 22, afebrile, satting 97% on room air.  CBC showed white count of 15. CMP showed her CKD stage 3.  Troponin, and TSH are within normal limits.  CT head negative for any acute intracranial process. CTA negative for large vessel occlusion. MRI brain showed Focal area of suspected diffusion restriction in rightward central chapincito is suspicious for focal ischemia. MRA brain is negative.  Neurology was consulted, patient will be admitted to Medicine further care.      Overview/Hospital Course:  No notes on file    Interval History:  No acute events overnight.  Patient states she is feeling little bit better today.  Pending further stroke workup.    Review of Systems  Objective:     Vital Signs (Most Recent):  Temp: 98.1 °F (36.7 °C) (06/10/24 0701)  Pulse: 62 (06/10/24 1000)  Resp: 20 (06/10/24 1000)  BP: (!) 160/98 (06/10/24 1000)  SpO2: 95 % (06/10/24 1000) Vital Signs (24h Range):  Temp:  [97.8 °F (36.6 °C)-98.1 °F (36.7 °C)] 98.1 °F (36.7 °C)  Pulse:  [51-77] 62  Resp:  [15-22] 20  SpO2:  [92 %-98 %] 95 %  BP: (137-193)/(61-98) 160/98      Weight: 63 kg (138 lb 14.2 oz)  Body mass index is 25.4 kg/m².    Intake/Output Summary (Last 24 hours) at 6/10/2024 1130  Last data filed at 6/10/2024 0615  Gross per 24 hour   Intake 150 ml   Output 250 ml   Net -100 ml         Physical Exam  Vitals reviewed.   Constitutional:       General: She is not in acute distress.     Appearance: Normal appearance.   HENT:      Head: Normocephalic and atraumatic.      Right Ear: External ear normal.      Left Ear: External ear normal.      Nose: Nose normal.      Mouth/Throat:      Mouth: Mucous membranes are moist.      Pharynx: Oropharynx is clear.   Eyes:      Extraocular Movements: Extraocular movements intact.      Conjunctiva/sclera: Conjunctivae normal.   Cardiovascular:      Rate and Rhythm: Normal rate and regular rhythm.      Pulses: Normal pulses.      Heart sounds: Normal heart sounds. No murmur heard.     No gallop.   Pulmonary:      Effort: Pulmonary effort is normal. No respiratory distress.      Breath sounds: Normal breath sounds. No wheezing or rales.   Abdominal:      General: Abdomen is flat. There is no distension.      Palpations: Abdomen is soft.      Tenderness: There is no abdominal tenderness. There is no guarding.   Musculoskeletal:         General: No swelling. Normal range of motion.      Cervical back: Normal range of motion and neck supple.   Skin:     General: Skin is warm and dry.   Neurological:      General: No focal deficit present.      Mental Status: She is alert and oriented to person, place, and time.             Significant Labs: All pertinent labs within the past 24 hours have been reviewed.    Significant Imaging: I have reviewed all pertinent imaging results/findings within the past 24 hours.    Assessment/Plan:      * Stroke  - MRI brain showed Focal area of suspected diffusion restriction in rightward central chapincito is suspicious for focal ischemia.  - Stroke pathway.  Neuro consultation, 2D echo, admit to tele.  -  PT/OT/speech.  - suspect stroke to be embolic secondary to her AFib, while not being on any anticoagulation.  Spoken to Neurology, who recommended to start anticoagulation 48 hours after stroke.   - patient will need Holter monitor interrogation.  - will give 325 mg of aspirin now, start 81 mg tomorrow.  - Lipitor q.h.s.. will order Lipid panel.    Accelerated hypertension  Chronic, controlled. Latest blood pressure and vitals reviewed-     Temp:  [97.9 °F (36.6 °C)]   Pulse:  [58-72]   Resp:  [17-22]   BP: (141-197)/(88-91)   SpO2:  [94 %-97 %] .     Patient was not on any blood pressure medication at home.  Will allow for permissive hypertension today given her acute stroke.  If blood pressure continued to be elevated, primary team to consider starting blood pressure medication tomorrow.      Leukocytosis  Suspect reactive.  However will rule out infection.  Chest x-ray is negative.  Will order UA, COVID/flu.  Hold antibiotics for now.      A-fib  - Paroxysmal AFib.  Currently patient is in normal sinus.  - Patient was supposed to be on Xarelto, but apparently she does not have Xarelto at home, but rather Eliquis.  She refused to take Eliquis however as she read on the Internet that patients with valve replacement should not be taking Eliquis.  - Will consult Cardiology. Appreciate recs.  Admit to tele.  2D echo.  Status post a Holter monitor 2 days ago.  Appreciate cardiology assistance in Holter monitor interrogation.      S/p TAVR (transcatheter aortic valve replacement), bioprosthetic  Echo will be ordered.  Cardiology has been consulted.  Admit to tele.        VTE Risk Mitigation (From admission, onward)           Ordered     enoxaparin injection 40 mg  Daily         06/09/24 1552     IP VTE HIGH RISK PATIENT  Once         06/09/24 1552     Place sequential compression device  Until discontinued         06/09/24 1552                    Discharge Planning   CELESTE: 6/12/2024     Code Status: Full Code   Is the  patient medically ready for discharge?:     Reason for patient still in hospital (select all that apply): Laboratory test                     Raheem Ryan Jr, MD  Department of Hospital Medicine   [facility]

## 2024-06-10 NOTE — PT/OT/SLP EVAL
Speech Language Pathology Evaluation  Cognitive/Bedside Swallow    Patient Name:  Elissa Raymond   MRN:  [id number]  Admitting Diagnosis: Stroke    Recommendations:                  General Recommendations:  Cognitive-linguistic therapy  Diet recommendations:  Regular, Thin   Aspiration Precautions: Standard aspiration precautions   General Precautions: Standard, blind, vision impaired, fall, hearing impaired  Communication strategies:   Pt very [place]. Eliminate background noise    Assessment:     Elissa Raymond is a 69 y.o. female with an admitting diagnosis of Stroke. Pt seen for clinical swallow and cognitive communication evaluations. Difficulty assessment 2° hearing impairment and visual impairment. However, she presents with possible mild cognitive linguistic impairment. Oropharyngeal swallow judged to be WFL. Will follow for ongoing evaluation and treatment as indicated.     History:   PER HPI: 69-year-old history of Afib currently not on any anticoagulation, prior stroke, and blind both eyes presented to the ER because of lethargy.  Patient recently had a Holter monitor placed 2 days ago on Friday for her h/o afib. Since then, she has been feeling weak and tired.  Apparently she went to sleep yesterday with no neuro deficits.  She woke up today feeling lethargic, and was slow to answer questions.   brought her to the ER for evaluation.     In the ER, vitals showed a blood pressure of 197/88, heart rate of 63, respiratory rate of 22, afebrile, satting 97% on room air.  CBC showed white count of 15. CMP showed her CKD stage 3.  Troponin, and TSH are within normal limits.  CT head negative for any acute intracranial process. CTA negative for large vessel occlusion. MRI brain showed Focal area of suspected diffusion restriction in rightward central [place]ito is suspicious for focal ischemia. MRA brain is negative.  Neurology was consulted, patient will be admitted to Medicine further care.     Past Medical  History:   Diagnosis Date    A-fib     Blind in both eyes     Hyperlipidemia     Hypertension     Stroke     x 3    Tobacco use 11/02/2017    Unspecified macular degeneration     Unspecified macular degeneration        Past Surgical History:   Procedure Laterality Date    ANGIOGRAPHY OF LOWER EXTREMITY N/A 11/01/2023    Procedure: ANGIOGRAM BILATERLA LOWER;  Surgeon: Martin Hair MD;  Location: [facility] CATH/EP LAB;  Service: General;  Laterality: N/A;    AORTIC VALVE REPLACEMENT      AORTOGRAPHY WITH EXTREMITY RUNOFF N/A 11/01/2023    Procedure: AORTOGRAM, WITH EXTREMITY RUNOFF;  Surgeon: Martin Hair MD;  Location: [facility] CATH/EP LAB;  Service: General;  Laterality: N/A;    BLADDER SURGERY      BLADDER SURGERY      Lift.    CATARACT EXTRACTION      CHOLECYSTECTOMY      HYSTERECTOMY      KNEE SURGERY Left     TONSILLECTOMY         Social History: Patient lives with .    Prior Intubation HX:  none this admit    Modified Barium Swallow: none in Epic    Imaging:  Imaging Results               MRI Brain Without Contrast (Final result)  Result time 06/09/24 13:35:31   Procedure changed from MRI Brain Ischemic Inter Pro Incl MRA w/o Con     Final result by Ozzy Mcbride MD (06/09/24 13:35:31)                   Impression:      1. Focal area of suspected diffusion restriction in rightward central chapincito is suspicious for focal ischemia, although artifactual etiology is conceivable but felt less likely.  Clinical correlation is needed.  2. Chronic small vessel ischemic changes.  3. Old lacunar infarct in rightward central chapincito superiorly.  This report was flagged in Epic as abnormal.      Electronically signed by: Ozzy Mcbride  Date:    06/09/2024  Time:    13:35               Narrative:    EXAMINATION:  MRI BRAIN WITHOUT CONTRAST    CLINICAL HISTORY:  Stroke, follow up;    TECHNIQUE:  MRI brain without IV contrast    COMPARISON:  CT 06/09/2024    FINDINGS:  Focal area of increased signal in rightward central  chapincito on diffusion sequence may have corresponding low signal on ADC map, but is somewhat nonspecific.  Diffusion-weighted imaging shows no additional diffusion restriction.    Cerebral and cerebellar atrophy is present.  Mild periventricular and subcortical white matter T2 hyperintensities suggest gliosis from chronic small vessel ischemic changes.  In right central chapincito superiorly, old lacunar infarct is present.    Calvarial bone marrow signal is normal.  Sinuses and mastoids are clear.    No intracranial mass effect or midline shift.  Ventricles and cisterns are maintained.  Major intracranial flow voids are unremarkable.                                       MRA Brain without contrast (Final result)  Result time 06/09/24 13:23:06      Final result by Ozzy Mcbride MD (06/09/24 13:23:06)                   Impression:      Negative MRA brain      Electronically signed by: Ozzy Mcbride  Date:    06/09/2024  Time:    13:23               Narrative:    EXAMINATION:  MRA BRAIN WITHOUT CONTRAST    CLINICAL HISTORY:  Stroke, follow up;    TECHNIQUE:  3-D time-of-flight MR angiography of brain without contrast.    COMPARISON:  CTA 06/09/2024    FINDINGS:  Major intracranial arteries show normal intraluminal flow related signal. Negative for vascular occlusion, focal stenosis, or dissection. Negative for aneurysm or evidence of vasculitis.                                       X-Ray Chest AP Portable (Final result)  Result time 06/09/24 11:56:06      Final result by Ozzy Mcbride MD (06/09/24 11:56:06)                   Impression:      No acute cardiopulmonary abnormality.      Electronically signed by: Ozzy Mcbride  Date:    06/09/2024  Time:    11:56               Narrative:    EXAMINATION:  XR CHEST AP PORTABLE    CLINICAL HISTORY:  Sepsis;    FINDINGS:  Portable chest at 11 19 compared with 04/27/2024 shows unchanged cardiomediastinal silhouette. Prior aortic valve replacement evident.  Implantable cardiac monitor  projecting over left chest his newly been placed in the interim.    Lungs are clear. Pulmonary vasculature is normal. Old left rib fractures unchanged.  No acute osseous abnormality                                       CTA Head and Neck (xpd) (Final result)  Result time 06/09/24 11:40:05      Final result by Ozzy Mcbride MD (06/09/24 11:40:05)                   Impression:      1. 30% stenosis of proximal left ICA due to noncalcified plaque.  2. 30-40% stenosis of left vertebral artery V2 and V4 segments.  3. 20% proximal right ICA stenosis due to noncalcified plaque.  4. 50% stenosis of the V1 segment right vertebral artery.  5. Mild peripheral atherosclerotic plaque affecting intracranial internal carotid arteries without stenosis.      Electronically signed by: Ozzy Mcbride  Date:    06/09/2024  Time:    11:40               Narrative:    EXAMINATION:  CTA HEAD AND NECK (XPD)    CLINICAL HISTORY:  Neuro deficit, acute, stroke suspected;    TECHNIQUE:  CT angiography of brain and neck with 100 mL Omnipaque 350. Maximum intensity projection coronal reformations were obtained at a separate workstation and stored in the patient's permanent medical record.    COMPARISON:  CT 06/09/2024, MRIs 09/20/2017    FINDINGS:  CTA BRAIN:    VASCULAR FINDINGS:    Mild peripheral calcified plaque affects cavernous segments of bilateral internal carotid arteries without significant narrowing.    Other major intracranial arteries are patent without atherosclerotic plaque, stenosis, intraluminal filling defect, or dissection.  Negative for aneurysm or evidence of vasculitis.    NONVASCULAR FINDINGS:    No midline shift.  No abnormal intra-axial or extra-axial enhancement.  Trace maxillary sinus mucosal thickening.    CTA NECK:    VASCULAR FINDINGS:    Conventional 3 branch vessel aortic arch anatomy is present.    Left common carotid artery is patent. Noncalcified plaque results in 30% stenosis of proximal left internal carotid  artery. Left ECA and branches opacify.  30-40% stenosis affects V2 segment of left vertebral artery and V4 segment, due to atherosclerotic plaque.    Right brachiocephalic artery is patent. Mild noncalcified plaque affects right common carotid artery. Noncalcified plaque in right carotid bulb extends into right internal carotid artery origin resulting in 20% right ICA stenosis.  Predominantly noncalcified plaque of V1 segment right vertebral artery results in 50% stenosis.    NONVASCULAR FINDINGS:    Visualized lung apices are clear.  Cervical soft tissues are unremarkable.  Implantable cardiac monitor left anterior chest wall incidentally noted.  No acute osseous abnormality.                                       CT HEAD FOR STROKE (Final result)  Result time 06/09/24 11:17:32      Final result by Ozzy Mcbride MD (06/09/24 11:17:32)                   Impression:      1. No acute intracranial abnormality.  2. Moderate chronic small vessel ischemic changes.  RESULT NOTIFICATION: These observations were discussed by Dr. Mcbride with, and acknowledged by, Dr. Brand at 11:17      Electronically signed by: Ozzy Mcbride  Date:    06/09/2024  Time:    11:17               Narrative:    EXAMINATION:  CT HEAD FOR STROKE    CLINICAL HISTORY:  Neuro deficit, acute, stroke suspected;    TECHNIQUE:  Head CT without IV contrast.    COMPARISON:  MRI 09/20/2017, CT 09/20/2017    FINDINGS:  Gray-white differentiation is maintained without hemorrhage, midline shift, or mass effect.    Minor chronic small-vessel ischemic changes affect periventricular white matter bilaterally.    The ventricles and cisterns are maintained.    Calvarium is intact. Trace maxillary sinus mucosal thickening is evident.                                       Prior diet: Regular/thin.    Occupation/hobbies/homemaking: Pt/family report PLOF as independent with ADLS, assistance with IADLS 2° visual impairment. Stays home alone. Pt does not drive.  "    Subjective      shares, "she's a little confused. A lot slower."  Pt reports fatigue.     Pain/Comfort:  Pain Rating 1: 0/10    Respiratory Status: Room air    Objective:   Pt seen for clinical swallow and cognitive communication evaluations. She is alert pleasant and cooperative. Legally blind (macular degeneration) and [place].     Cognitive Status:  MoCA initiated, however, eventually terminated 2° severity of hearing impairment. Unable to complete MoCA-HI (hearing impairment) 2° visual impairment.   Attention --Attended without difficulty but not formally assessed  Orientation Person, Place, and Situation. Disoriented to ISI and date. Oriented to month and year.   Memory recall --able to recall meals and items on breakfast tray. Able to recall personal information an general information without difficulty.   Problem Solving Solutions --provided accurate solution to hypothetical problems, presented verbally, across 1 of 2 trials  Simple calculation --impaired across 1 of 3 trials      Receptive Language:   Comprehension: Adversely affected by hearing impairment. Able to follow 1-3 step body. Able to engage in conversation appropriately      Pragmatics:    appropriate    Expressive Language:  Verbal:    WFL        Motor Speech:  WFL    Voice:   WFL    Visual-Spatial:  DNT 2° baseline visual impaired.     Reading:   DNT 2° visual impairment     Written Expression:   DNT 2° visual impairment    Oral Musculature Evaluation  Oral Musculature: WFL  Dentition:  (upper denture, lower partial)  Secretion Management: adequate  Mucosal Quality: adequate  Mandibular Strength and Mobility: WFL  Oral Labial Strength and Mobility: WFL  Lingual Strength and Mobility: WFL  Velar Elevation: WFL  Buccal Strength and Mobility: WFL  Volitional Cough: adequate  Volitional Swallow: able to palpate laryngeal rise  Voice Prior to PO Intake: clear    Bedside Swallow Eval:   Consistencies Assessed:  Thin liquids --via cup and " straw  Puree ---applesauce  Mixed consistencies --diced peaches  Solids --samantha cracker      Oral Phase:   WFL    Pharyngeal Phase:   no overt clinical signs/symptoms of aspiration  no overt clinical signs/symptoms of pharyngeal dysphagia    Compensatory Strategies  None    Treatment: Pt/family educated re: results/recs of evaluation, SLP role and POC. Receptive to information provided.     Goals:   Multidisciplinary Problems       SLP Goals          Problem: SLP    Goal Priority Disciplines Outcome   SLP Goal     SLP Progressing   Description: 1. Provide accurate solutions to hypothetical problems, presented verbally, with MOD I  2. Functional problem solving tasks related to time and money with MOD I                       Plan:     Patient to be seen:  3 x/week   Plan of Care expires:  06/17/24  Plan of Care reviewed with:  patient, spouse   SLP Follow-Up:  Yes        Time Tracking:     SLP Treatment Date:   06/10/24  Speech Start Time:  1130  Speech Stop Time:  1150     Speech Total Time (min):  20 min    Billable Minutes: Eval 12 , Eval Swallow and Oral Function 8, and Total Time 20    06/10/2024

## 2024-06-11 ENCOUNTER — CLINICAL SUPPORT (OUTPATIENT)
Dept: SMOKING CESSATION | Facility: CLINIC | Age: 69
End: 2024-06-11
Payer: COMMERCIAL

## 2024-06-11 VITALS
HEART RATE: 72 BPM | WEIGHT: 138.88 LBS | SYSTOLIC BLOOD PRESSURE: 141 MMHG | RESPIRATION RATE: 18 BRPM | TEMPERATURE: 98 F | DIASTOLIC BLOOD PRESSURE: 69 MMHG | HEIGHT: 62 IN | OXYGEN SATURATION: 95 % | BODY MASS INDEX: 25.55 KG/M2

## 2024-06-11 DIAGNOSIS — F17.200 NICOTINE DEPENDENCE: Primary | ICD-10-CM

## 2024-06-11 LAB
ALBUMIN SERPL BCP-MCNC: 3.2 G/DL (ref 3.5–5.2)
ALP SERPL-CCNC: 49 U/L (ref 55–135)
ALT SERPL W/O P-5'-P-CCNC: 14 U/L (ref 10–44)
ANION GAP SERPL CALC-SCNC: 7 MMOL/L (ref 8–16)
AST SERPL-CCNC: 14 U/L (ref 10–40)
BASOPHILS # BLD AUTO: 0.01 K/UL (ref 0–0.2)
BASOPHILS NFR BLD: 0.1 % (ref 0–1.9)
BILIRUB SERPL-MCNC: 0.5 MG/DL (ref 0.1–1)
BUN SERPL-MCNC: 22 MG/DL (ref 8–23)
CALCIUM SERPL-MCNC: 8 MG/DL (ref 8.7–10.5)
CHLORIDE SERPL-SCNC: 106 MMOL/L (ref 95–110)
CO2 SERPL-SCNC: 24 MMOL/L (ref 23–29)
CREAT SERPL-MCNC: 1 MG/DL (ref 0.5–1.4)
DIFFERENTIAL METHOD BLD: ABNORMAL
EOSINOPHIL # BLD AUTO: 0.7 K/UL (ref 0–0.5)
EOSINOPHIL NFR BLD: 5.8 % (ref 0–8)
ERYTHROCYTE [DISTWIDTH] IN BLOOD BY AUTOMATED COUNT: 13.8 % (ref 11.5–14.5)
EST. GFR  (NO RACE VARIABLE): >60 ML/MIN/1.73 M^2
GLUCOSE SERPL-MCNC: 92 MG/DL (ref 70–110)
HCT VFR BLD AUTO: 43.3 % (ref 37–48.5)
HGB BLD-MCNC: 14.3 G/DL (ref 12–16)
IMM GRANULOCYTES # BLD AUTO: 0.13 K/UL (ref 0–0.04)
IMM GRANULOCYTES NFR BLD AUTO: 1 % (ref 0–0.5)
LYMPHOCYTES # BLD AUTO: 3.5 K/UL (ref 1–4.8)
LYMPHOCYTES NFR BLD: 28 % (ref 18–48)
MCH RBC QN AUTO: 30.7 PG (ref 27–31)
MCHC RBC AUTO-ENTMCNC: 33 G/DL (ref 32–36)
MCV RBC AUTO: 93 FL (ref 82–98)
MONOCYTES # BLD AUTO: 0.9 K/UL (ref 0.3–1)
MONOCYTES NFR BLD: 7.1 % (ref 4–15)
NEUTROPHILS # BLD AUTO: 7.3 K/UL (ref 1.8–7.7)
NEUTROPHILS NFR BLD: 58 % (ref 38–73)
NRBC BLD-RTO: 0 /100 WBC
PLATELET # BLD AUTO: 135 K/UL (ref 150–450)
PMV BLD AUTO: 11.6 FL (ref 9.2–12.9)
POTASSIUM SERPL-SCNC: 4.4 MMOL/L (ref 3.5–5.1)
PROT SERPL-MCNC: 5.4 G/DL (ref 6–8.4)
RBC # BLD AUTO: 4.66 M/UL (ref 4–5.4)
SODIUM SERPL-SCNC: 137 MMOL/L (ref 136–145)
WBC # BLD AUTO: 12.52 K/UL (ref 3.9–12.7)

## 2024-06-11 PROCEDURE — 97116 GAIT TRAINING THERAPY: CPT

## 2024-06-11 PROCEDURE — 97530 THERAPEUTIC ACTIVITIES: CPT

## 2024-06-11 PROCEDURE — 97161 PT EVAL LOW COMPLEX 20 MIN: CPT

## 2024-06-11 PROCEDURE — 36415 COLL VENOUS BLD VENIPUNCTURE: CPT | Performed by: HOSPITALIST

## 2024-06-11 PROCEDURE — 94640 AIRWAY INHALATION TREATMENT: CPT

## 2024-06-11 PROCEDURE — 99900031 HC PATIENT EDUCATION (STAT)

## 2024-06-11 PROCEDURE — 85025 COMPLETE CBC W/AUTO DIFF WBC: CPT | Performed by: HOSPITALIST

## 2024-06-11 PROCEDURE — 80053 COMPREHEN METABOLIC PANEL: CPT | Performed by: HOSPITALIST

## 2024-06-11 PROCEDURE — 25000242 PHARM REV CODE 250 ALT 637 W/ HCPCS: Performed by: HOSPITALIST

## 2024-06-11 PROCEDURE — 25000003 PHARM REV CODE 250: Performed by: HOSPITALIST

## 2024-06-11 PROCEDURE — 94761 N-INVAS EAR/PLS OXIMETRY MLT: CPT

## 2024-06-11 RX ORDER — ATORVASTATIN CALCIUM 40 MG/1
80 TABLET, FILM COATED ORAL NIGHTLY
Qty: 180 TABLET | Refills: 0 | Status: SHIPPED | OUTPATIENT
Start: 2024-06-11 | End: 2024-09-09

## 2024-06-11 RX ORDER — IPRATROPIUM BROMIDE AND ALBUTEROL SULFATE 2.5; .5 MG/3ML; MG/3ML
3 SOLUTION RESPIRATORY (INHALATION) EVERY 6 HOURS PRN
Qty: 75 ML | Refills: 3 | Status: SHIPPED | OUTPATIENT
Start: 2024-06-11 | End: 2024-09-09

## 2024-06-11 RX ORDER — IBUPROFEN 200 MG
1 TABLET ORAL DAILY
Qty: 7 PATCH | Refills: 0 | Status: SHIPPED | OUTPATIENT
Start: 2024-06-11

## 2024-06-11 RX ORDER — EZETIMIBE 10 MG/1
10 TABLET ORAL DAILY
Qty: 90 TABLET | Refills: 0 | Status: SHIPPED | OUTPATIENT
Start: 2024-06-11 | End: 2024-09-09

## 2024-06-11 RX ORDER — ALBUTEROL SULFATE 90 UG/1
2 AEROSOL, METERED RESPIRATORY (INHALATION) EVERY 6 HOURS PRN
Qty: 18 G | Refills: 0 | Status: SHIPPED | OUTPATIENT
Start: 2024-06-11 | End: 2024-09-09

## 2024-06-11 RX ORDER — BUDESONIDE AND FORMOTEROL FUMARATE DIHYDRATE 160; 4.5 UG/1; UG/1
2 AEROSOL RESPIRATORY (INHALATION) 2 TIMES DAILY
Qty: 30.6 G | Refills: 0 | Status: SHIPPED | OUTPATIENT
Start: 2024-06-11 | End: 2024-09-09

## 2024-06-11 RX ORDER — ASPIRIN 81 MG/1
81 TABLET ORAL DAILY
Qty: 90 TABLET | Refills: 0 | Status: SHIPPED | OUTPATIENT
Start: 2024-06-11 | End: 2024-09-09

## 2024-06-11 RX ADMIN — EZETIMIBE 10 MG: 10 TABLET ORAL at 08:06

## 2024-06-11 RX ADMIN — ASPIRIN 81 MG: 81 TABLET, COATED ORAL at 08:06

## 2024-06-11 RX ADMIN — BUDESONIDE INHALATION 0.5 MG: 0.5 SUSPENSION RESPIRATORY (INHALATION) at 07:06

## 2024-06-11 RX ADMIN — ARFORMOTEROL TARTRATE 15 MCG: 15 SOLUTION RESPIRATORY (INHALATION) at 07:06

## 2024-06-11 NOTE — PROGRESS NOTES
Pt has smoked now for 54 years and smokes 1pk/day. Down from 4pk/day in the past.  States she has quit since her sickness and it's been a week now. Plans to stay quit.

## 2024-06-11 NOTE — DISCHARGE SUMMARY
[facility] Medicine  Discharge Summary      Patient Name: Elissa Raymond  MRN: [id number]  [facility]: [id number]  Patient Class: IP- Inpatient  Admission Date: 6/9/2024  Hospital Length of Stay: 2 days  Discharge Date and Time:  06/11/2024 9:54 AM  Attending Physician: Raheem Ryan Jr., MD   Discharging Provider: Raheem Ryan Jr, MD  Primary Care Provider: Scar Hussein MD    Primary Care Team: Networked reference to record PCT     HPI:   69-year-old history of Afib currently not on any anticoagulation, prior stroke, and blind both eyes presented to the ER because of lethargy.  Patient recently had a Holter monitor placed 2 days ago on Friday for her h/o afib. Since then, she has been feeling weak and tired.  Apparently she went to sleep yesterday with no neuro deficits.  She woke up today feeling lethargic, and was slow to answer questions.   brought her to the ER for evaluation.    In the ER, vitals showed a blood pressure of 197/88, heart rate of 63, respiratory rate of 22, afebrile, satting 97% on room air.  CBC showed white count of 15. CMP showed her CKD stage 3.  Troponin, and TSH are within normal limits.  CT head negative for any acute intracranial process. CTA negative for large vessel occlusion. MRI brain showed Focal area of suspected diffusion restriction in rightward central chapincito is suspicious for focal ischemia. MRA brain is negative.  Neurology was consulted, patient will be admitted to Medicine further care.      * No surgery found *      Hospital Course:   69-year-old history of Afib currently not on any anticoagulation, prior stroke, and blind both eyes presented to the ER because of lethargy.  Patient recently had a Holter monitor placed 2 days ago on Friday for her h/o afib. Since then, she has been feeling weak and tired.  Apparently she went to sleep yesterday with no neuro deficits.  She woke up today feeling lethargic, and was slow to answer questions.    brought her to the ER for evaluation.     In the ER, vitals showed a blood pressure of 197/88, heart rate of 63, respiratory rate of 22, afebrile, satting 97% on room air.  CBC showed white count of 15. CMP showed her CKD stage 3.  Troponin, and TSH are within normal limits.  CT head negative for any acute intracranial process. CTA negative for large vessel occlusion. MRI brain showed Focal area of suspected diffusion restriction in rightward central chapincito is suspicious for focal ischemia. MRA brain is negative.  Neurology was consulted, patient will be admitted to Medicine further care.     MRI showed acute stroke in the chapincito region.  Patient worked with PT and OT and recommended home health care.  He was set up by social work.  [facility], [facility] verified that patient could afford medications     Goals of Care Treatment Preferences:  Code Status: Full Code      Consults:   Consults (From admission, onward)          Status Ordering Provider     Inpatient consult to Social Work/Case Management  Once        Provider:  (Not yet assigned)    Acknowledged MOOKIE UMAÑA     Inpatient consult to Cardiology  Once        Provider:  Rebecca Wagner MD    Completed TRAVIS PEREZ     Inpatient consult to Physical Medicine Rehab  Once        Provider:  (Not yet assigned)    Acknowledged TARVIS PEREZ     Inpatient consult to Registered Dietitian/Nutritionist  Once        Provider:  (Not yet assigned)    Completed TRAVIS PEREZ     IP consult to case management/social work  Once        Provider:  (Not yet assigned)    Completed ANA TRAVIS S.     Inpatient consult to Neurology  Once        Provider:  Erica Clancy MD    Completed BIANCA WHEELER            No new Assessment & Plan notes have been filed under this hospital service since the last note was generated.  Service: Hospital Medicine    Final Active Diagnoses:    Diagnosis Date Noted POA    PRINCIPAL PROBLEM:  Stroke  [I63.9] 06/09/2024 Yes    A-fib [I48.91] 06/09/2024 Yes    Leukocytosis [D72.829] 06/09/2024 Yes    Accelerated hypertension [I10] 06/09/2024 Yes    S/p TAVR (transcatheter aortic valve replacement), bioprosthetic [Z95.3] 11/15/2023 Not Applicable      Problems Resolved During this Admission:       Discharged Condition: fair    Disposition: Home-Health Care Svc    Follow Up:   Follow-up Information       Scar Hussein MD Follow up in 1 week(s).    Specialty: Internal Medicine  Contact information:  [address] [place] [address]  [phone]                           Patient Instructions:      Ambulatory referral/consult to Smoking Cessation Program   Standing Status: Future   Referral Priority: Routine Referral Type: Consultation   Referral Reason: Specialty Services Required   Requested Specialty: CTTS   Number of Visits Requested: 1     Ambulatory referral/consult to Home Health   Standing Status: Future   Referral Priority: Routine Referral Type: Home Health   Referral Reason: Specialty Services Required   Requested Specialty: Home Health Services   Number of Visits Requested: 1       Significant Diagnostic Studies: Labs: CMP   Recent Labs   Lab 06/09/24  1041 06/10/24  0413 06/11/24  0427    138 137   K 3.9 4.1 4.4    105 106   CO2 25 26 24   GLU 76 90 92   BUN 21 25* 22   CREATININE 1.1 1.1 1.0   CALCIUM 8.5* 8.3* 8.0*   PROT 6.0 5.4* 5.4*   ALBUMIN 3.7 3.2* 3.2*   BILITOT 0.6 0.4 0.5   ALKPHOS 50* 52* 49*   AST 15 12 14   ALT 29 21 14   ANIONGAP 6* 7* 7*    and CBC   Recent Labs   Lab 06/09/24  1041 06/10/24  0413 06/11/24  0427   WBC 15.00* 13.24* 12.52   HGB 15.6 14.3 14.3   HCT 47.0 44.2 43.3   * 125* 135*       Pending Diagnostic Studies:       None           Medications:  Reconciled Home Medications:      Medication List        START taking these medications      nicotine 21 mg/24 hr  Commonly known as: NICODERM CQ  Place 1 patch onto the skin once daily.            CHANGE  how you take these medications      atorvastatin 40 MG tablet  Commonly known as: LIPITOR  Take 2 tablets (80 mg total) by mouth every evening.  What changed:   how much to take  when to take this            CONTINUE taking these medications      albuterol 90 mcg/actuation inhaler  Commonly known as: PROVENTIL/VENTOLIN HFA  Inhale 2 puffs into the lungs every 6 (six) hours as needed for Wheezing.     albuterol-ipratropium 2.5 mg-0.5 mg/3 mL nebulizer solution  Commonly known as: DUO-NEB  Take 3 mLs by nebulization every 6 (six) hours as needed for Wheezing. Rescue     aspirin 81 MG EC tablet  Commonly known as: ECOTRIN  Take 1 tablet (81 mg total) by mouth once daily.     budesonide-formoterol 160-4.5 mcg 160-4.5 mcg/actuation Hfaa  Commonly known as: SYMBICORT  Inhale 2 puffs into the lungs 2 (two) times daily.     CAPSICUM (CAYENNE) ORAL  Take 1 capsule by mouth Daily.     cholecalciferol (vitamin D3) 1,250 mcg (50,000 unit) capsule  Take 50,000 Units by mouth.     CINNAMON 500 mg capsule  Generic drug: cinnamon bark  Take 500 mg by mouth once daily.     ezetimibe 10 mg tablet  Commonly known as: ZETIA  Take 1 tablet (10 mg total) by mouth once daily.     GINGER EXTRACT ORAL  Take 1 capsule by mouth once daily.     OXYGEN-AIR DELIVERY SYSTEMS MISC  by Misc.(Non-Drug; Combo Route) route. 3 L by nasal route     rivaroxaban 20 mg Tab  Commonly known as: XARELTO  Take 1 tablet (20 mg total) by mouth daily with dinner or evening meal.              Indwelling Lines/Drains at time of discharge:   Lines/Drains/Airways       Drain  Duration             Female External Urinary Catheter w/ Suction 06/09/24 2220 1 day                    Time spent on the discharge of patient: 40 minutes         Raheem Ryan Jr, MD  Department of Hospital Medicine  [facility]

## 2024-06-11 NOTE — PT/OT/SLP EVAL
Physical Therapy Evaluation    Patient Name:  Elissa Raymond   MRN:  [id number]    Recommendations:     Discharge Recommendations: Low Intensity Therapy   Discharge Equipment Recommendations: none   Barriers to discharge: None    Assessment:     Elissa Raymond is a 69 y.o. female admitted with a medical diagnosis of Stroke.  She presents with the following impairments/functional limitations: weakness, impaired endurance, impaired functional mobility, gait instability, impaired balance, decreased lower extremity function, impaired cardiopulmonary response to activity. Patient is agreeable to participation with PT evaluation. She denies pain at rest. She is legally blind. She lives with spouse and does not use an AD for ambulation at baseline. Strength and coordination testing WFL. She requires SBA for supine to sit and CGA for sit to stand. She requests purewick be removed and wants to walk to the bathroom to void. She ambulated 10' to bathroom with CGA and VC for toilet transfer. She then ambulated 210' with RW, CGA, and slow gait speed. She is agreeable to sit up in chair with chair alarm on, spouse present, and RN notified.     Rehab Prognosis: Good; patient would benefit from acute skilled PT services to address these deficits and reach maximum level of function.    Recent Surgery: * No surgery found *      Plan:     During this hospitalization, patient to be seen 5 x/week to address the identified rehab impairments via gait training, therapeutic activities, therapeutic exercises and progress toward the following goals:    Plan of Care Expires:  07/11/24    Subjective     Chief Complaint: home today   Patient/Family Comments/goals: home soon   Pain/Comfort:  Pain Rating 1: 0/10    Patients cultural, spiritual, Alevism conflicts given the current situation:      Living Environment:  Patient lives with spouse in a [facility] with no [facility]. Spouse works during the day.   Prior to admission, patients level of function was  no AD for household ambulation, but will take rollator if going out. She endorses 1 fall in the past year. She had recent HHPT, but was discharged. She reports this is her fifth CVA. She does not drive due to visual impairment.  Equipment used at home: rollator, bath bench.  DME owned (not currently used): none.  Upon discharge, patient will have assistance from spouse.    Objective:     Communicated with PORFIRIO Lemons prior to session.  Patient found HOB elevated with bed alarm, blood pressure cuff, pulse ox (continuous), telemetry, PureWick  upon PT entry to room.    General Precautions: Standard, fall, vision impaired, hearing impaired  Orthopedic Precautions:N/A   Braces: N/A  Respiratory Status: Room air    Exams:  Fine Motor Coordination:    -       Intact  Left hand thumb/finger opposition skills, Right hand thumb/finger opposition skills, Left hand, diadochokinesis skill , and Right hand, diadochokinesis skill   RLE Strength: 4/5  LLE Strength: 4/5    Functional Mobility:  Bed Mobility:     Supine to Sit: stand by assistance  Transfers:     Sit to Stand:  contact guard assistance with no AD  Toilet Transfer: contact guard assistance with  no AD  using  Step Transfer  Gait: 210' with RW, CGA, and slow gait speed      AM-PAC 6 CLICK MOBILITY  Total Score:21       Treatment & Education:  Patient was educated on the importance of OOB activity and functional mobility to negate negative effects of prolonged bed rest during hospitalization, safe transfers and ambulation, and D/C planning     She requires CGA and VC for toilet transfer and performs hygiene independently    PT recommended that patient use rollator for ambulation upon D/C due to patient complaining of mild increased weakness versus baseline    Patient left up in chair with all lines intact, call button in reach, chair alarm on, RN notified, and spouse present.    GOALS:   Multidisciplinary Problems       Physical Therapy Goals          Problem: Physical  Therapy    Goal Priority Disciplines Outcome Goal Variances Interventions   Physical Therapy Goal     PT, PT/OT      Description: Goals to be met by: 24    Patient will increase functional independence with mobility by performin. Supine to sit with Supervision  2. Sit to stand transfer with Supervision  3. Bed to chair transfer with Supervision using Rolling Walker  4. Gait  x 250 feet with Supervision using Rolling Walker.   5. Lower extremity exercise program x20 reps per handout, with supervision                       History:     Past Medical History:   Diagnosis Date    A-fib     Blind in both eyes     Hyperlipidemia     Hypertension     Stroke     x 3    Tobacco use 2017    Unspecified macular degeneration     Unspecified macular degeneration        Past Surgical History:   Procedure Laterality Date    ANGIOGRAPHY OF LOWER EXTREMITY N/A 2023    Procedure: ANGIOGRAM BILATERLA LOWER;  Surgeon: Martin Hair MD;  Location: [facility] CATH/EP LAB;  Service: General;  Laterality: N/A;    AORTIC VALVE REPLACEMENT      AORTOGRAPHY WITH EXTREMITY RUNOFF N/A 2023    Procedure: AORTOGRAM, WITH EXTREMITY RUNOFF;  Surgeon: Martin Hair MD;  Location: [facility] CATH/EP LAB;  Service: General;  Laterality: N/A;    BLADDER SURGERY      BLADDER SURGERY      Lift.    CATARACT EXTRACTION      CHOLECYSTECTOMY      HYSTERECTOMY      KNEE SURGERY Left     TONSILLECTOMY         Time Tracking:     PT Received On: 24  PT Start Time: 0932     PT Stop Time: 1005  PT Total Time (min): 33 min     Billable Minutes: Evaluation 10, Gait Training 13, and Therapeutic Activity 10      2024

## 2024-06-11 NOTE — PLAN OF CARE
Patient to transfer to home health service with [facility], verified SOC date is 6/12/24 per June at  agency [phone].  Patient will transport home via private vehicle with spouse.    Discharge orders and chart reviewed with no further post-acute discharge needs identified at this time.  At this time, patient is cleared for discharge from Case Management standpoint.        06/11/24 1008   Final Note   Assessment Type Final Discharge Note   Anticipated Discharge Disposition Home-Health   Hospital Resources/Appts/Education Provided   (Patient to coordinate with home health for scheduling post hospital follow up appointment with their PCP.)   Post-Acute Status   Post-Acute Authorization Home Health   Home Health Status Set-up Complete/Auth obtained   Coverage [employer], [facility]   Discharge Delays None known at this time

## 2024-06-11 NOTE — NURSING
IV's removed. Tolerated well. Discharge instructions, follow up appointments, and meds reviewed with patient and . Verbilized understanding. Patient brought down via wheelchair with all belongings to family transport. Safety maintained.

## 2024-06-11 NOTE — CARE UPDATE
06/10/24 2000   Patient Assessment/Suction   Level of Consciousness (AVPU) alert   Respiratory Effort Normal   Expansion/Accessory Muscles/Retractions abdominal muscle use   All Lung Fields Breath Sounds clear   Rhythm/Pattern, Respiratory unlabored   Cough Frequency infrequent   Cough Type dry;nonproductive   PRE-TX-O2   Device (Oxygen Therapy) room air   SpO2 (!) 94 %   Pulse Oximetry Type Continuous   $ Pulse Oximetry - Multiple Charge Pulse Oximetry - Multiple   Pulse 69   Resp 19   /60   Aerosol Therapy   $ Aerosol Therapy Charges Aerosol Treatment   Daily Review of Necessity (SVN) completed   Respiratory Treatment Status (SVN) given   Treatment Route (SVN) mask   Patient Position semi-Robison's   Post Treatment Assessment (SVN) breath sounds improved;vital signs unchanged   Signs of Intolerance (SVN) none   Education   $ Education Bronchodilator;15 min   Respiratory Evaluation   $ Care Plan Tech Time 15 min

## 2024-06-11 NOTE — PROGRESS NOTES
[facility]  Department of Neurology  Progress Note  Date: 2024 10:16 AM          Patient Name: Elissa Raymond   MRN: [id number]   : 1955    AGE: 69 y.o.    LOS: 2 days Hospital Day: 3  Admit date: 2024 10:23 AM     HPI per EMR: Elissa Raymond is a 69 y.o. female with a history of  Afib currently not on any anticoagulation, prior stroke, and blind both eyes presented to the ER because of lethargy.  Patient recently had a Holter monitor placed 2 days ago on Friday for her h/o afib. Since then, she has been feeling weak and tired.  Apparently she went to sleep yesterday with no neuro deficits.  She woke up today feeling lethargic, and was slow to answer questions.   brought her to the ER for evaluation.     In the ER, vitals showed a blood pressure of 197/88, heart rate of 63, respiratory rate of 22, afebrile, satting 97% on room air.  CBC showed white count of 15. CMP showed her CKD stage 3.  Troponin, and TSH are within normal limits.  CT head negative for any acute intracranial process. CTA negative for large vessel occlusion. MRI brain showed Focal area of suspected diffusion restriction in rightward central chapincito is suspicious for focal ischemia. MRA brain is negative.  Neurology was consulted, patient will be admitted to Medicine further care.     Neurology consult:  Patient was seen and examined by me.  She states that she presented to the hospital for lethargy and generalized weakness after she had her loop recorder placed.  She states that she could not wake up from the bed and felt extremely tired.  She denies any focal weakness on 1 side of the body, denies any facial droop, slurred speech or aphasia, diplopia or vision loss.     She presented to the ER for these symptoms and in the ER her blood pressure was 197/88 on arrival.  CT head was negative for acute intracranial pathology and CT angiogram head and neck was negative for large vessel occlusion.  MRI brain was done  in the ER showed a small area of DWI hyperintensity in the right chapincito without clear correlating ADC hypointensity.  Admitted to the hospital for further stroke workup and management.     Currently she states that she feels back to baseline denies any complaints     She has a history of atrial fibrillation however he does not take anticoagulation due to insurance issues.    06/11/2024: No acute events overnight. Patient was seen and examined by me this morning. Neuro exam is normal and at baseline.  Denies any other complaints this morning.       Vitals:  Patient Vitals for the past 24 hrs:   BP Temp Temp src Pulse Resp SpO2   06/11/24 1000 -- -- -- 82 16 95 %   06/11/24 0901 -- -- -- 72 20 98 %   06/11/24 0900 (!) 141/69 -- -- 68 20 95 %   06/11/24 0800 131/60 -- -- (!) 55 20 100 %   06/11/24 0742 -- -- -- 68 20 96 %   06/11/24 0701 (!) 120/58 97.9 °F (36.6 °C) Oral 65 17 95 %   06/11/24 0700 (!) 120/58 -- -- 66 19 95 %   06/11/24 0501 (!) 117/57 -- -- 64 (!) 24 (!) 94 %   06/11/24 0500 (!) 117/57 -- -- 71 (!) 24 (!) 93 %   06/11/24 0400 (!) 111/57 -- -- 68 (!) 21 (!) 92 %   06/11/24 0300 112/74 98 °F (36.7 °C) Oral 71 20 (!) 93 %   06/11/24 0200 (!) 124/58 -- -- 62 18 (!) 94 %   06/11/24 0100 (!) 156/71 -- -- 60 12 (!) 92 %   06/11/24 0000 (!) 123/59 -- -- 65 (!) 21 (!) 93 %   06/10/24 2300 (!) 153/74 98.4 °F (36.9 °C) Oral 68 (!) 24 (!) 94 %   06/10/24 2200 (!) 177/75 -- -- 74 (!) 21 (!) 92 %   06/10/24 2104 132/62 -- -- 79 (!) 22 (!) 91 %   06/10/24 2100 132/62 -- -- 71 (!) 23 (!) 91 %   06/10/24 2000 112/60 -- -- 69 19 (!) 94 %   06/10/24 1901 -- 98.4 °F (36.9 °C) Oral -- -- --   06/10/24 1900 112/60 -- -- 71 20 (!) 92 %   06/10/24 1800 133/63 -- -- 71 20 95 %   06/10/24 1701 -- -- -- (!) 59 16 95 %   06/10/24 1700 (!) 158/71 -- -- (!) 57 20 95 %   06/10/24 1600 (!) 105/59 -- -- (!) 59 20 95 %   06/10/24 1502 -- 98.6 °F (37 °C) Oral -- -- --   06/10/24 1501 -- -- -- (!) 58 16 95 %   06/10/24 1500 -- -- -- (!) 58  14 98 %   06/10/24 1400 (!) 119/55 -- -- (!) 58 20 95 %   06/10/24 1301 -- -- -- 67 20 95 %   06/10/24 1300 (!) 155/97 -- -- 67 20 95 %   06/10/24 1200 (!) 142/64 -- -- 64 16 95 %   06/10/24 1101 127/66 97.9 °F (36.6 °C) Oral (!) 56 19 95 %   06/10/24 1100 127/66 -- -- 63 20 95 %     PHYSICAL EXAM:     GENERAL APPEARANCE: Well-developed, well-nourished female in no acute distress.  HEENT: Normocephalic and atraumatic. PERRL. Oropharynx unremarkable.  PULM: Comfortable on room air.  CV: RRR.  ABDOMEN: Soft, nontender.  EXTREMITIES: No signs of vascular compromise. Pulses present. No cyanosis, clubbing or edema.  SKIN: Clear; no rashes, lesions or skin breaks in exposed areas.      NEURO:   MENTAL STATUS: Patient awake and oriented to time, place, and person. Affect normal.  CRANIAL NERVES II-XII: Pupils equal, round and reactive to light. Extraocular movements full and intact. No facial asymmetry.  Baseline blindness bilaterally due to macular degeneration.  MOTOR: Normal bulk. Tone normal and symmetrical throughout.  No abnormal movements. No tremor.   Strength 5/5 throughout    REFLEXES: DTRs 2+; normal and symmetric throughout.   SENSATION: Sensation grossly intact to fine touch.  COORDINATION: Finger-to-nose normal for age and symmetric.  STATION: Romberg deferred.  GAIT: Deferred.    CURRENT SCHEDULED MEDICATIONS:   arformoteroL  15 mcg Nebulization BID    aspirin  81 mg Oral Daily    atorvastatin  40 mg Oral QHS    budesonide  0.5 mg Nebulization Q12H    enoxparin  40 mg Subcutaneous Daily    ezetimibe  10 mg Oral Daily     CURRENT INFUSIONS:    DATA:  Recent Labs   Lab 06/09/24  1041 06/10/24  0413 06/11/24  0427    138 137   K 3.9 4.1 4.4    105 106   CO2 25 26 24   BUN 21 25* 22   CREATININE 1.1 1.1 1.0   GLU 76 90 92   CALCIUM 8.5* 8.3* 8.0*   PHOS  --  4.2  --    MG  --  2.3  --    AST 15 12 14   ALT 29 21 14     Recent Labs   Lab 06/09/24  1041 06/10/24  0413 06/11/24  0427   WBC 15.00* 13.24*  "12.52   HGB 15.6 14.3 14.3   HCT 47.0 44.2 43.3   * 125* 135*     No results found for: "PROTEINCSF", "GLUCCSF", "WBCCSF", "RBCCSF", "PMNCSF"  Hemoglobin A1C   Date Value Ref Range Status   08/24/2021 5.9 4.2 - 6.5 % Final   06/06/2018 5.0 4.0 - 5.6 % Final     Comment:     ADA Screening Guidelines:  5.7-6.4%  Consistent with prediabetes  >or=6.5%  Consistent with diabetes  High levels of fetal hemoglobin interfere with the HbA1C  assay. Heterozygous hemoglobin variants (HbS, HgC, etc)do  not significantly interfere with this assay.   However, presence of multiple variants may affect accuracy.              I have personally reviewed and interpreted the pertinent imaging and lab results.  Imaging Results               MRI Brain Without Contrast (Final result)  Result time 06/09/24 13:35:31   Procedure changed from MRI Brain Ischemic Inter Pro Incl MRA w/o Con     Final result by Ozzy Mcbride MD (06/09/24 13:35:31)                   Impression:      1. Focal area of suspected diffusion restriction in rightward central chapincito is suspicious for focal ischemia, although artifactual etiology is conceivable but felt less likely.  Clinical correlation is needed.  2. Chronic small vessel ischemic changes.  3. Old lacunar infarct in rightward central chapincito superiorly.  This report was flagged in Epic as abnormal.      Electronically signed by: Ozzy Mcbride  Date:    06/09/2024  Time:    13:35               Narrative:    EXAMINATION:  MRI BRAIN WITHOUT CONTRAST    CLINICAL HISTORY:  Stroke, follow up;    TECHNIQUE:  MRI brain without IV contrast    COMPARISON:  CT 06/09/2024    FINDINGS:  Focal area of increased signal in rightward central chapincito on diffusion sequence may have corresponding low signal on ADC map, but is somewhat nonspecific.  Diffusion-weighted imaging shows no additional diffusion restriction.    Cerebral and cerebellar atrophy is present.  Mild periventricular and subcortical white matter T2 " hyperintensities suggest gliosis from chronic small vessel ischemic changes.  In right central chapincito superiorly, old lacunar infarct is present.    Calvarial bone marrow signal is normal.  Sinuses and mastoids are clear.    No intracranial mass effect or midline shift.  Ventricles and cisterns are maintained.  Major intracranial flow voids are unremarkable.                                       MRA Brain without contrast (Final result)  Result time 06/09/24 13:23:06      Final result by Ozzy Mcbride MD (06/09/24 13:23:06)                   Impression:      Negative MRA brain      Electronically signed by: Ozzy Mcbride  Date:    06/09/2024  Time:    13:23               Narrative:    EXAMINATION:  MRA BRAIN WITHOUT CONTRAST    CLINICAL HISTORY:  Stroke, follow up;    TECHNIQUE:  3-D time-of-flight MR angiography of brain without contrast.    COMPARISON:  CTA 06/09/2024    FINDINGS:  Major intracranial arteries show normal intraluminal flow related signal. Negative for vascular occlusion, focal stenosis, or dissection. Negative for aneurysm or evidence of vasculitis.                                       X-Ray Chest AP Portable (Final result)  Result time 06/09/24 11:56:06      Final result by Ozzy Mcbride MD (06/09/24 11:56:06)                   Impression:      No acute cardiopulmonary abnormality.      Electronically signed by: Ozzy Mcbride  Date:    06/09/2024  Time:    11:56               Narrative:    EXAMINATION:  XR CHEST AP PORTABLE    CLINICAL HISTORY:  Sepsis;    FINDINGS:  Portable chest at 11 19 compared with 04/27/2024 shows unchanged cardiomediastinal silhouette. Prior aortic valve replacement evident.  Implantable cardiac monitor projecting over left chest his newly been placed in the interim.    Lungs are clear. Pulmonary vasculature is normal. Old left rib fractures unchanged.  No acute osseous abnormality                                       CTA Head and Neck (xpd) (Final result)  Result time  06/09/24 11:40:05      Final result by Ozzy Mcbride MD (06/09/24 11:40:05)                   Impression:      1. 30% stenosis of proximal left ICA due to noncalcified plaque.  2. 30-40% stenosis of left vertebral artery V2 and V4 segments.  3. 20% proximal right ICA stenosis due to noncalcified plaque.  4. 50% stenosis of the V1 segment right vertebral artery.  5. Mild peripheral atherosclerotic plaque affecting intracranial internal carotid arteries without stenosis.      Electronically signed by: Ozzy Mcbride  Date:    06/09/2024  Time:    11:40               Narrative:    EXAMINATION:  CTA HEAD AND NECK (XPD)    CLINICAL HISTORY:  Neuro deficit, acute, stroke suspected;    TECHNIQUE:  CT angiography of brain and neck with 100 mL Omnipaque 350. Maximum intensity projection coronal reformations were obtained at a separate workstation and stored in the patient's permanent medical record.    COMPARISON:  CT 06/09/2024, MRIs 09/20/2017    FINDINGS:  CTA BRAIN:    VASCULAR FINDINGS:    Mild peripheral calcified plaque affects cavernous segments of bilateral internal carotid arteries without significant narrowing.    Other major intracranial arteries are patent without atherosclerotic plaque, stenosis, intraluminal filling defect, or dissection.  Negative for aneurysm or evidence of vasculitis.    NONVASCULAR FINDINGS:    No midline shift.  No abnormal intra-axial or extra-axial enhancement.  Trace maxillary sinus mucosal thickening.    CTA NECK:    VASCULAR FINDINGS:    Conventional 3 branch vessel aortic arch anatomy is present.    Left common carotid artery is patent. Noncalcified plaque results in 30% stenosis of proximal left internal carotid artery. Left ECA and branches opacify.  30-40% stenosis affects V2 segment of left vertebral artery and V4 segment, due to atherosclerotic plaque.    Right brachiocephalic artery is patent. Mild noncalcified plaque affects right common carotid artery. Noncalcified plaque in  right carotid bulb extends into right internal carotid artery origin resulting in 20% right ICA stenosis.  Predominantly noncalcified plaque of V1 segment right vertebral artery results in 50% stenosis.    NONVASCULAR FINDINGS:    Visualized lung apices are clear.  Cervical soft tissues are unremarkable.  Implantable cardiac monitor left anterior chest wall incidentally noted.  No acute osseous abnormality.                                       CT HEAD FOR STROKE (Final result)  Result time 06/09/24 11:17:32      Final result by Ozzy Mcbride MD (06/09/24 11:17:32)                   Impression:      1. No acute intracranial abnormality.  2. Moderate chronic small vessel ischemic changes.  RESULT NOTIFICATION: These observations were discussed by Dr. Mcbride with, and acknowledged by, Dr. Brand at 11:17      Electronically signed by: Ozzy Mcbride  Date:    06/09/2024  Time:    11:17               Narrative:    EXAMINATION:  CT HEAD FOR STROKE    CLINICAL HISTORY:  Neuro deficit, acute, stroke suspected;    TECHNIQUE:  Head CT without IV contrast.    COMPARISON:  MRI 09/20/2017, CT 09/20/2017    FINDINGS:  Gray-white differentiation is maintained without hemorrhage, midline shift, or mass effect.    Minor chronic small-vessel ischemic changes affect periventricular white matter bilaterally.    The ventricles and cisterns are maintained.    Calvarium is intact. Trace maxillary sinus mucosal thickening is evident.                                              ASSESSMENT AND PLAN:        Subacute infarct  Atrial fibrillation     Plan:   Presented with generalized weakness and lethargy.  MRI brain showed hyperintensity in the right side of the chapincito without clear ADC correlation.  This could be subacute infarct versus T2 shine through from prior lesion. Etiology:  Small-vessel disease  Recommend anticoagulation with Eliquis or Xarelto along with aspirin 81 mg daily and Lipitor 40 mg nightly for stroke prevention in the  setting of atrial fibrillation.  Check lipid panel, hemoglobin A1c and 2D echo.  PT OT and speech therapy evaluate and treat.    Slowly normalize blood pressure  Workup for metabolic derangements and infectious abnormalities per primary team.    Cardiology following.    Outpatient neurology follow-up.           José Manuel Rosado MD  Neurology/vascular Neurology  Date of Service: 06/11/2024  10:16 AM    Please note: This note was transcribed using voice recognition software. Because of this technology there are often uinintended grammatical, spelling, and other transcription errors. Please disregard these errors.

## 2024-06-11 NOTE — HOSPITAL COURSE
69-year-old history of Afib currently not on any anticoagulation, prior stroke, and blind both eyes presented to the ER because of lethargy.  Patient recently had a Holter monitor placed 2 days ago on Friday for her h/o afib. Since then, she has been feeling weak and tired.  Apparently she went to sleep yesterday with no neuro deficits.  She woke up today feeling lethargic, and was slow to answer questions.   brought her to the ER for evaluation.     In the ER, vitals showed a blood pressure of 197/88, heart rate of 63, respiratory rate of 22, afebrile, satting 97% on room air.  CBC showed white count of 15. CMP showed her CKD stage 3.  Troponin, and TSH are within normal limits.  CT head negative for any acute intracranial process. CTA negative for large vessel occlusion. MRI brain showed Focal area of suspected diffusion restriction in rightward central chapincito is suspicious for focal ischemia. MRA brain is negative.  Neurology was consulted, patient will be admitted to Medicine further care.     MRI showed acute stroke in the chapincito region.  Patient worked with PT and OT and recommended home health care.  He was set up by social work.  [facility] verified that patient could afford medications

## 2024-06-11 NOTE — CARE UPDATE
06/11/24 0742   Patient Assessment/Suction   Level of Consciousness (AVPU) alert   Respiratory Effort Normal   Expansion/Accessory Muscles/Retractions no use of accessory muscles   All Lung Fields Breath Sounds Anterior:;Lateral:;diminished;clear   Rhythm/Pattern, Respiratory unlabored   Cough Frequency infrequent   Cough Type dry;nonproductive   PRE-TX-O2   Device (Oxygen Therapy) room air   SpO2 96 %   Pulse Oximetry Type Continuous   $ Pulse Oximetry - Multiple Charge Pulse Oximetry - Multiple   Pulse 68   Resp 20   Aerosol Therapy   $ Aerosol Therapy Charges Aerosol Treatment   Daily Review of Necessity (SVN) completed   Respiratory Treatment Status (SVN) given   Treatment Route (SVN) mask   Patient Position semi-Robison's   Post Treatment Assessment (SVN) breath sounds improved   Signs of Intolerance (SVN) none   Breath Sounds Post-Respiratory Treatment   Throughout All Fields Post-Treatment All Fields   Throughout All Fields Post-Treatment aeration increased   Post-treatment Heart Rate (beats/min) 68   Post-treatment Resp Rate (breaths/min) 20   Education   $ Education Bronchodilator;15 min

## 2024-06-11 NOTE — PROGRESS NOTES
"[facility]  Department of Cardiology  Progress Note      PATIENT NAME: Elissa Raymond  MRN: [id number]  TODAY'S DATE: 06/11/2024  ADMIT DATE: 6/9/2024                          CONSULT REQUESTED BY: No att. providers found    SUBJECTIVE     PRINCIPAL PROBLEM: Stroke      REASON FOR CONSULT:  "Stroke, PAF not on AC"    Interval history:  06/11/2024  Patient out of bed denies complaints  Pt noted on telemetry    HPI:  Pt is 69 female with PMH TAVR (2/22) Stroke (x3), HTN, HLD, blind  "who presents to the emergency department for evaluation of confusion upon waking up this morning sometime around 7:30 a.m..  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  She complains of a slight headache since yesterday.  Also complains of epigastric/substernal discomfort today.   reports that she is thinking and talking very slow today which is abnormal.  Patient feels like she is coming out of anesthesia in is sedated is how she describes it.  Patient has a history of TAVR bioprosthetic heart valve and stopped taking her Xarelto because she read somewhere that it should not be taken with a prosthetic heart valve"  Review of patient's allergies indicates:   Allergen Reactions    Iodinated contrast media Shortness Of Breath    Pcn [penicillins] Other (See Comments)     Unable to obtain       Past Medical History:   Diagnosis Date    A-fib     Blind in both eyes     Hyperlipidemia     Hypertension     Stroke     x 3    Tobacco use 11/02/2017    Unspecified macular degeneration     Unspecified macular degeneration      Past Surgical History:   Procedure Laterality Date    ANGIOGRAPHY OF LOWER EXTREMITY N/A 11/01/2023    Procedure: ANGIOGRAM BILATERLA LOWER;  Surgeon: Martin Hair MD;  Location: [facility] CATH/EP LAB;  Service: General;  Laterality: N/A;    AORTIC VALVE " REPLACEMENT      AORTOGRAPHY WITH EXTREMITY RUNOFF N/A 11/01/2023    Procedure: AORTOGRAM, WITH EXTREMITY RUNOFF;  Surgeon: Martin Hair MD;  Location: [facility] CATH/EP LAB;  Service: General;  Laterality: N/A;    BLADDER SURGERY      BLADDER SURGERY      Lift.    CATARACT EXTRACTION      CHOLECYSTECTOMY      HYSTERECTOMY      KNEE SURGERY Left     TONSILLECTOMY       Social History     Tobacco Use    Smoking status: Every Day     Current packs/day: 1.00     Average packs/day: 1 pack/day for 46.0 years (46.0 ttl pk-yrs)     Types: Cigarettes    Smokeless tobacco: Never   Substance Use Topics    Drug use: No        REVIEW OF SYSTEMS  Negative except as mentioned in HPI    OBJECTIVE     VITAL SIGNS (Most Recent)  Temp: 97.9 °F (36.6 °C) (06/11/24 0701)  Pulse: 72 (06/11/24 1101)  Resp: 18 (06/11/24 1101)  BP: (!) 141/69 (06/11/24 0900)  SpO2: 95 % (06/11/24 1101)    VENTILATION STATUS  Resp: 18 (06/11/24 1101)  SpO2: 95 % (06/11/24 1101)           I & O (Last 24H):  Intake/Output Summary (Last 24 hours) at 6/11/2024 1229  Last data filed at 6/11/2024 0858  Gross per 24 hour   Intake 1300 ml   Output 1700 ml   Net -400 ml       WEIGHTS  Wt Readings from Last 3 Encounters:   06/09/24 2022 63 kg (138 lb 14.2 oz)   06/09/24 1032 62.6 kg (138 lb)   06/10/24 1020 63 kg (138 lb 14.2 oz)   06/07/24 0820 62.6 kg (138 lb)       PHYSICAL EXAM    GENERAL:  Pleasant, elderly female resting in bed in no apparent distress.  HEENT: Normocephalic.  Patient was blind  NECK: No JVD.   CARDIAC: Regular rate and rhythm. S1 is normal.S2 is normal.+ murmur  CHEST ANATOMY: normal.  Loop recorder device chest wall  LUNGS: Clear to auscultation. No wheezing or rhonchi..   ABDOMEN: Soft . Normal bowel sounds.    EXTREMITIES: No edema  CENTRAL NERVOUS SYSTEM: AAO x 3  SKIN: No rash     HOME MEDICATIONS:  No current facility-administered medications on file prior to encounter.     Current Outpatient Medications on File Prior to Encounter    Medication Sig Dispense Refill    CAPSICUM, CAYENNE, ORAL Take 1 capsule by mouth Daily.      cholecalciferol, vitamin D3, 1,250 mcg (50,000 unit) capsule Take 50,000 Units by mouth.      cinnamon bark (CINNAMON) 500 mg capsule Take 500 mg by mouth once daily.      ginger root (GINGER EXTRACT ORAL) Take 1 capsule by mouth once daily.      [DISCONTINUED] albuterol (PROVENTIL/VENTOLIN HFA) 90 mcg/actuation inhaler INHALE 2 PUFFS INTO THE LUNGS EVERY 6 HOURS AS NEEDED FOR WHEEZING 18 g 0    [DISCONTINUED] albuterol-ipratropium (DUO-NEB) 2.5 mg-0.5 mg/3 mL nebulizer solution Take 3 mLs by nebulization every 6 (six) hours as needed for Wheezing. Rescue 75 mL 3    [DISCONTINUED] aspirin (ECOTRIN) 81 MG EC tablet Take 1 tablet (81 mg total) by mouth once daily. 90 tablet 3    [DISCONTINUED] atorvastatin (LIPITOR) 40 MG tablet Take 1 tablet (40 mg total) by mouth once daily. 90 tablet 0    [DISCONTINUED] budesonide-formoterol 160-4.5 mcg (SYMBICORT) 160-4.5 mcg/actuation HFAA Inhale 2 puffs into the lungs 2 (two) times daily.      [DISCONTINUED] ezetimibe (ZETIA) 10 mg tablet Take 1 tablet (10 mg total) by mouth once daily. 90 tablet 3    OXYGEN-AIR DELIVERY SYSTEMS MISC by [facility] [place].(Non-Drug; Combo Route) route. 3 L by nasal route         SCHEDULED MEDS:   arformoteroL  15 mcg Nebulization BID    aspirin  81 mg Oral Daily    atorvastatin  40 mg Oral QHS    budesonide  0.5 mg Nebulization Q12H    enoxparin  40 mg Subcutaneous Daily    ezetimibe  10 mg Oral Daily       CONTINUOUS INFUSIONS:    PRN MEDS:  Current Facility-Administered Medications:     bisacodyL, 10 mg, Rectal, Daily PRN    labetalol, 10 mg, Intravenous, Q15 Min PRN    sodium chloride 0.9%, 500 mL, Intravenous, PRN    sodium chloride 0.9%, 10 mL, Intravenous, PRN    LABS AND DIAGNOSTICS     CBC LAST 3 DAYS  Recent Labs   Lab 06/09/24  1041 06/10/24  0413 06/11/24  0427   WBC 15.00* 13.24* 12.52   RBC 5.12 4.69 4.66   HGB 15.6 14.3 14.3   HCT 47.0 44.2 43.3   MCV  "92 94 93   MCH 30.5 30.5 30.7   MCHC 33.2 32.4 33.0   RDW 13.9 13.9 13.8   * 125* 135*   MPV 11.4 12.4 11.6   GRAN 63.1  9.5* 59.4  7.9* 58.0  7.3   LYMPH 25.9  3.9 28.2  3.7 28.0  3.5   MONO 6.9  1.0 6.4  0.9 7.1  0.9   BASO 0.03 0.03 0.01   NRBC 0 0 0       COAGULATION LAST 3 DAYS  Recent Labs   Lab 06/09/24  1041 06/10/24  0413   INR 1.0 1.0   APTT  --  30.0       CHEMISTRY LAST 3 DAYS  Recent Labs   Lab 06/09/24  1041 06/10/24  0413 06/11/24  0427    138 137   K 3.9 4.1 4.4    105 106   CO2 25 26 24   ANIONGAP 6* 7* 7*   BUN 21 25* 22   CREATININE 1.1 1.1 1.0   GLU 76 90 92   CALCIUM 8.5* 8.3* 8.0*   MG  --  2.3  --    ALBUMIN 3.7 3.2* 3.2*   PROT 6.0 5.4* 5.4*   ALKPHOS 50* 52* 49*   ALT 29 21 14   AST 15 12 14   BILITOT 0.6 0.4 0.5       CARDIAC PROFILE LAST 3 DAYS  No results for input(s): "BNP", "CPK", "CPKMB", "LDH", "TROPONINI" in the last 168 hours.    ENDOCRINE LAST 3 DAYS  Recent Labs   Lab 06/09/24  1041   TSH 2.715       LAST ARTERIAL BLOOD GAS  ABG  No results for input(s): "PH", "PO2", "PCO2", "HCO3", "BE" in the last 168 hours.    LAST 7 DAYS MICROBIOLOGY   Microbiology Results (last 7 days)       Procedure Component Value Units Date/Time    Blood culture x two cultures. Draw prior to antibiotics. [[id number]] Collected: 06/09/24 1335    Order Status: Completed Specimen: Blood from Peripheral, Antecubital, Right Updated: 06/10/24 1432     Blood Culture, Routine No Growth to date      No Growth to date    Narrative:      Aerobic and anaerobic  Collection has been rescheduled by HRA at 06/09/2024 12:55 Reason:   Patient unavailable eric  Collection has been rescheduled by HRA at 06/09/2024 12:55 Reason:   Patient unavailable eric    Blood culture x two cultures. Draw prior to antibiotics. [[id number]] Collected: 06/09/24 1337    Order Status: Completed Specimen: Blood from Peripheral, Hand, Right Updated: 06/10/24 1432     Blood Culture, Routine No Growth to date      No " Growth to date    Narrative:      Aerobic and anaerobic  Collection has been rescheduled by HRA at 06/09/2024 12:55 Reason:   Patient unavailable eric  Collection has been rescheduled by HRA at 06/09/2024 12:55 Reason:   Patient unavailable eric            MOST RECENT IMAGING  Echo    Left Ventricle: The left ventricle is normal in size. Normal wall   thickness. There is normal systolic function with a visually estimated   ejection fraction of 65 - 70%. Grade I diastolic dysfunction. E/A ratio is   0.84.    Right Ventricle: Normal right ventricular cavity size. Systolic   function is normal.    Mitral Valve: There is mild regurgitation.    Tricuspid Valve: There is mild regurgitation.    Pulmonic Valve: Not assessed.      ECHOCARDIOGRAM RESULTS (last 5)  No results found for this or any previous visit.      CURRENT/PREVIOUS VISIT EKG  Results for orders placed or performed during the hospital encounter of 06/09/24   ECG 12 lead    Collection Time: 06/09/24 10:00 AM   Result Value Ref Range    QRS Duration 62 ms    OHS QTC Calculation 429 ms    Narrative    Test Reason : R29.818,    Vent. Rate : 063 BPM     Atrial Rate : 063 BPM     P-R Int : 138 ms          QRS Dur : 062 ms      QT Int : 420 ms       P-R-T Axes : 023 069 071 degrees     QTc Int : 429 ms    Normal sinus rhythm  Normal ECG  When compared with ECG of 05-APR-2024 10:37,  T wave inversion no longer evident in Inferior leads  T wave inversion no longer evident in Lateral leads  Confirmed by Alcides PHILIPPE, Luis Alfredo BOURGEOIS ([id number]) on 6/10/2024 9:52:37 PM    Referred By: [email or web address]   SELF           Confirmed By:Luis Alfredo Mcgrath MD           ASSESSMENT/PLAN:     Active Hospital Problems    Diagnosis    *Stroke    A-fib    Leukocytosis    Accelerated hypertension    S/p TAVR (transcatheter aortic valve replacement), bioprosthetic       ASSESSMENT & PLAN:   Recurrent embolic Strokes   HTN  H/O TAVR 2022  Loop recorder device    RECOMMENDATIONS:  EKG this hospitalization  showed no ischemic changes;   Telemetry showed no events or PAF  Reviewed loop recorder device interrogation, questionable pause on 06/07/2024.  Patient needs outpatient referral to electrophysiology.  Continue anticoagulation with Xarelto & aspirin per Neurology   Continue statin therapy  Patient needs outpatient follow up with Cardiology in 1-2 weeks        Deepa Peters NP  [facility]  Department of Cardiology  Date of Service: 06/11/2024

## 2024-06-12 ENCOUNTER — TELEPHONE (OUTPATIENT)
Dept: ADMINISTRATIVE | Facility: CLINIC | Age: 69
End: 2024-06-12
Payer: MEDICARE

## 2024-06-12 ENCOUNTER — PATIENT OUTREACH (OUTPATIENT)
Dept: ADMINISTRATIVE | Facility: CLINIC | Age: 69
End: 2024-06-12
Payer: MEDICARE

## 2024-06-12 NOTE — PROGRESS NOTES
C3 nurse Joseph escalated patient to pharmacist, as her prescriptions were sent to the incorrect pharmacy and she is therefore without her medications. Spoke to Ms. Raymond. Her prescriptions were sent to [facility] (ASA, atorvastatin, Symbicort, albuterol and Duonebs). At the time she verified her pharmacy info with the provider, she was not aware that they only fill for the  and not the spouse. [facility] denied her prescriptions and refused to fill (and, according to Joseph, the prescribing physician would not reroute the prescriptions). Spoke to Belkys, pharmacy technician at [facility] pharmacy, who states they do not transfer prescriptions to or from other pharmacies. She states that any prescription they are unable to fulfill is simply denied. Phoned Ms. Raymond's PCP's office to enlist their help in getting these prescriptions sent to the correct pharmacy ([facility], which was added as the preferred pharmacy in patient's chart). Spoke to Thania at Dr. Hussein's office who took the names of the medications and stated she would follow up with Dr. Hussein to try and get them taken care of for the patient. She already had the correct pharmacy information and patient contact information). Phoned Ms. Raymond to update her. She verbalized understanding.

## 2024-06-12 NOTE — PROGRESS NOTES
C3 nurse spoke with Elissa Raymond and Arpit for a TCC post hospital discharge follow up call. The patient has a scheduled HOSFU appointment with Scar Hussein MD on 6/12/24 @ 1100.

## 2024-06-14 LAB
BACTERIA BLD CULT: NORMAL
BACTERIA BLD CULT: NORMAL

## 2024-06-20 ENCOUNTER — TELEPHONE (OUTPATIENT)
Dept: CARDIOLOGY | Facility: CLINIC | Age: 69
End: 2024-06-20
Payer: MEDICARE

## 2024-06-20 NOTE — TELEPHONE ENCOUNTER
----- Message from Trinidad Hutton sent at 6/20/2024  1:57 PM CDT -----  Regarding: Appointment Provider Reschedule Request  Contact: patient at [phone]  Type:  Appointment Provider Reschedule Request    Name of Caller:  patient at [phone]    Additional Information:  calling to see if appointment that was rescheduled can be changed to a Friday.Please call and advise. Thank you

## 2024-07-01 PROBLEM — I63.441 CEREBROVASCULAR ACCIDENT (CVA) DUE TO EMBOLISM OF RIGHT CEREBELLAR ARTERY: Status: RESOLVED | Noted: 2024-04-01 | Resolved: 2024-07-01

## 2024-07-12 ENCOUNTER — OFFICE VISIT (OUTPATIENT)
Dept: CARDIOLOGY | Facility: CLINIC | Age: 69
End: 2024-07-12
Payer: MEDICARE

## 2024-07-12 VITALS
OXYGEN SATURATION: 98 % | HEART RATE: 68 BPM | DIASTOLIC BLOOD PRESSURE: 70 MMHG | SYSTOLIC BLOOD PRESSURE: 122 MMHG | BODY MASS INDEX: 26.29 KG/M2 | HEIGHT: 62 IN | WEIGHT: 142.88 LBS

## 2024-07-12 DIAGNOSIS — I63.9 CEREBROVASCULAR ACCIDENT (CVA), UNSPECIFIED MECHANISM: Primary | ICD-10-CM

## 2024-07-12 DIAGNOSIS — Z95.818 STATUS POST PLACEMENT OF IMPLANTABLE LOOP RECORDER: ICD-10-CM

## 2024-07-12 DIAGNOSIS — M79.10 MYALGIA DUE TO STATIN: ICD-10-CM

## 2024-07-12 DIAGNOSIS — T46.6X5A MYALGIA DUE TO STATIN: ICD-10-CM

## 2024-07-12 DIAGNOSIS — Z95.3 S/P TAVR (TRANSCATHETER AORTIC VALVE REPLACEMENT), BIOPROSTHETIC: ICD-10-CM

## 2024-07-12 PROBLEM — I73.9 PERIPHERAL ARTERIAL DISEASE: Status: RESOLVED | Noted: 2023-11-01 | Resolved: 2024-07-12

## 2024-07-12 PROBLEM — R01.1 HEART MURMUR ON PHYSICAL EXAMINATION: Status: RESOLVED | Noted: 2018-06-06 | Resolved: 2024-07-12

## 2024-07-12 PROBLEM — I48.91 A-FIB: Status: RESOLVED | Noted: 2024-06-09 | Resolved: 2024-07-12

## 2024-07-12 PROBLEM — I35.0 SEVERE AORTIC STENOSIS: Status: RESOLVED | Noted: 2022-01-05 | Resolved: 2024-07-12

## 2024-07-12 PROBLEM — I77.1 BILATERAL ILIAC ARTERY STENOSIS: Status: RESOLVED | Noted: 2023-11-15 | Resolved: 2024-07-12

## 2024-07-12 PROBLEM — I10 ACCELERATED HYPERTENSION: Status: RESOLVED | Noted: 2024-06-09 | Resolved: 2024-07-12

## 2024-07-12 PROCEDURE — 99999 PR PBB SHADOW E&M-EST. PATIENT-LVL IV: CPT | Mod: PBBFAC,,, | Performed by: INTERNAL MEDICINE

## 2024-07-12 RX ORDER — EPINEPHRINE 0.22MG
200 AEROSOL WITH ADAPTER (ML) INHALATION DAILY
Qty: 180 CAPSULE | Refills: 3 | Status: SHIPPED | OUTPATIENT
Start: 2024-07-12 | End: 2025-07-12

## 2024-09-04 ENCOUNTER — DOCUMENT SCAN (OUTPATIENT)
Dept: HOME HEALTH SERVICES | Facility: HOSPITAL | Age: 69
End: 2024-09-04
Payer: MEDICARE

## 2024-09-09 PROBLEM — I63.9 STROKE: Status: RESOLVED | Noted: 2024-06-09 | Resolved: 2024-09-09

## 2024-11-16 NOTE — LETTER
September 21, 2018      Darius Amaro, DO  [address] [place] [address]           [place] [facility]  [address]  [place] [address]  Phone: [phone]  Fax: [phone]          Patient: Elissa Raymond   MR Number: [id number]   YOB: 1955   Date of Visit: 9/21/2018       Dear Dr. Darius Amaro:    Thank you for referring lEissa Raymond to me for evaluation. Attached you will find relevant portions of my assessment and plan of care.    If you have questions, please do not hesitate to call me. I look forward to following Elissa Raymond along with you.    Sincerely,    Rickie Pate, OD    Enclosure  CC:  No Recipients    If you would like to receive this communication electronically, please contact [email or web address] or [phone] to request more information on [employer] [facility] [employer] Link access.    For providers and/or their staff who would like to refer a patient to [facility], please contact us through our one-stop-shop provider referral line, [facility] , at [phone].    If you feel you have received this communication in error or would no longer like to receive these types of communications, please e-mail [email or web address]         
No

## 2024-11-23 ENCOUNTER — HOSPITAL ENCOUNTER (OUTPATIENT)
Dept: CARDIOLOGY | Facility: CLINIC | Age: 69
Discharge: HOME | End: 2024-11-23
Attending: INTERNAL MEDICINE
Payer: MEDICARE

## 2024-11-23 ENCOUNTER — CLINICAL SUPPORT (OUTPATIENT)
Dept: CARDIOLOGY | Facility: CLINIC | Age: 69
End: 2024-11-23

## 2024-11-23 DIAGNOSIS — I49.8 OTHER SPECIFIED CARDIAC ARRHYTHMIAS: ICD-10-CM

## 2024-11-23 PROCEDURE — 93298 REM INTERROG DEV EVAL SCRMS: CPT | Mod: PN | Performed by: INTERNAL MEDICINE

## 2024-12-24 ENCOUNTER — CLINICAL SUPPORT (OUTPATIENT)
Dept: CARDIOLOGY | Facility: CLINIC | Age: 69
End: 2024-12-24

## 2024-12-24 ENCOUNTER — HOSPITAL ENCOUNTER (OUTPATIENT)
Dept: CARDIOLOGY | Facility: CLINIC | Age: 69
Discharge: HOME | End: 2024-12-24
Attending: INTERNAL MEDICINE
Payer: MEDICARE

## 2024-12-24 DIAGNOSIS — I49.8 OTHER SPECIFIED CARDIAC ARRHYTHMIAS: ICD-10-CM

## 2024-12-24 PROCEDURE — 93298 REM INTERROG DEV EVAL SCRMS: CPT | Mod: PN | Performed by: INTERNAL MEDICINE

## 2025-01-24 ENCOUNTER — HOSPITAL ENCOUNTER (OUTPATIENT)
Dept: CARDIOLOGY | Facility: CLINIC | Age: 70
Discharge: HOME | End: 2025-01-24
Attending: INTERNAL MEDICINE
Payer: MEDICARE

## 2025-01-24 ENCOUNTER — CLINICAL SUPPORT (OUTPATIENT)
Dept: CARDIOLOGY | Facility: CLINIC | Age: 70
End: 2025-01-24
Payer: MEDICARE

## 2025-01-24 DIAGNOSIS — I49.8 OTHER SPECIFIED CARDIAC ARRHYTHMIAS: ICD-10-CM

## 2025-01-24 PROCEDURE — 93298 REM INTERROG DEV EVAL SCRMS: CPT | Mod: PN | Performed by: INTERNAL MEDICINE

## 2025-02-07 ENCOUNTER — OFFICE VISIT (OUTPATIENT)
Dept: URGENT CARE | Facility: CLINIC | Age: 70
End: 2025-02-07
Payer: MEDICARE

## 2025-02-07 VITALS
RESPIRATION RATE: 24 BRPM | SYSTOLIC BLOOD PRESSURE: 148 MMHG | OXYGEN SATURATION: 94 % | HEART RATE: 88 BPM | TEMPERATURE: 98 F | DIASTOLIC BLOOD PRESSURE: 85 MMHG

## 2025-02-07 DIAGNOSIS — J44.1 COPD WITH ACUTE EXACERBATION: ICD-10-CM

## 2025-02-07 DIAGNOSIS — J01.40 ACUTE NON-RECURRENT PANSINUSITIS: ICD-10-CM

## 2025-02-07 DIAGNOSIS — R05.9 COUGH, UNSPECIFIED TYPE: Primary | ICD-10-CM

## 2025-02-07 DIAGNOSIS — H66.93 ACUTE OTITIS MEDIA, BILATERAL: ICD-10-CM

## 2025-02-07 LAB
CTP QC/QA: YES
CTP QC/QA: YES
FLUAV AG NPH QL: NEGATIVE
FLUBV AG NPH QL: NEGATIVE
SARS CORONAVIRUS 2 ANTIGEN: NEGATIVE

## 2025-02-07 PROCEDURE — 99214 OFFICE O/P EST MOD 30 MIN: CPT | Mod: S$GLB,,, | Performed by: STUDENT IN AN ORGANIZED HEALTH CARE EDUCATION/TRAINING PROGRAM

## 2025-02-07 PROCEDURE — 87804 INFLUENZA ASSAY W/OPTIC: CPT | Mod: QW,,, | Performed by: STUDENT IN AN ORGANIZED HEALTH CARE EDUCATION/TRAINING PROGRAM

## 2025-02-07 PROCEDURE — 87811 SARS-COV-2 COVID19 W/OPTIC: CPT | Mod: QW,S$GLB,, | Performed by: STUDENT IN AN ORGANIZED HEALTH CARE EDUCATION/TRAINING PROGRAM

## 2025-02-07 RX ORDER — DOXYCYCLINE HYCLATE 100 MG
100 TABLET ORAL 2 TIMES DAILY
Qty: 20 TABLET | Refills: 0 | Status: SHIPPED | OUTPATIENT
Start: 2025-02-07 | End: 2025-02-17

## 2025-02-07 RX ORDER — CHLOPHEDIANOL HCL AND PYRILAMINE MALEATE 12.5; 12.5 MG/5ML; MG/5ML
5-10 SOLUTION ORAL
Qty: 118 ML | Refills: 0 | Status: SHIPPED | OUTPATIENT
Start: 2025-02-07

## 2025-02-07 RX ORDER — PREDNISONE 20 MG/1
40 TABLET ORAL DAILY
Qty: 10 TABLET | Refills: 0 | Status: SHIPPED | OUTPATIENT
Start: 2025-02-07 | End: 2025-02-12

## 2025-02-07 NOTE — PROGRESS NOTES
Subjective:      Patient ID: Elissa Raymond is a 69 y.o. female.    Vitals:  temperature is 98.1 °F (36.7 °C). Her blood pressure is 148/85 (abnormal) and her pulse is 88. Her respiration is 24 (abnormal) and oxygen saturation is 94% (abnormal).     Chief Complaint: Cough    Patient is a 69-year-old female with a past medical history of hypertension, hyperlipidemia, anemia, gout, CVA, status post TAVR, atrial fibrillation, hypertension, hyperlipidemia, macular degeneration, COPD and GERD who presents to clinic for evaluation of URI and sinus related symptoms.  Patient reports she has had sinus symptoms for a while now however started having URI symptoms about 3 days ago.  Patient reports that her  has been previously sick with similar symptoms.  Patient reports she is not vaccinated stating she is not going to get vaccinated either.  Patient states that she still smokes a pack a day for more than 50+ years now.  Patient states does not feel like stopping.  Patient states she has taken TheraFlu and vitamin-C packs with no significant relief to symptoms.  Spouse reports patient needs a refill on her inhaler.        Constitution: Positive for fatigue. Negative for chills, sweating and fever.   HENT:  Positive for ear pain, hearing loss (Reports chronic), congestion, postnasal drip and sinus pressure. Negative for ear discharge, tinnitus and sore throat.    Neck: neck negative.   Cardiovascular: Negative.  Negative for chest pain and palpitations.   Eyes: Negative.    Respiratory:  Positive for cough and COPD (History). Negative for chest tightness, sputum production and shortness of breath.    Gastrointestinal:  Positive for constipation. Negative for abdominal pain, nausea, vomiting and diarrhea.   Endocrine: negative.   Genitourinary: Negative.  Negative for dysuria, frequency and urgency.   Musculoskeletal:  Positive for muscle ache.   Skin: Negative.  Negative for color change, pale, rash and erythema.    Allergic/Immunologic: Negative.    Neurological:  Positive for headaches. Negative for dizziness, light-headedness, passing out, disorientation and altered mental status.   Hematologic/Lymphatic: Negative.    Psychiatric/Behavioral: Negative.  Negative for altered mental status, disorientation and confusion.       Objective:     Physical Exam   Constitutional: She is oriented to person, place, and time. She appears well-developed. She is cooperative.  Non-toxic appearance. She does not appear ill. No distress.   HENT:   Head: Normocephalic and atraumatic.   Ears:   Right Ear: External ear and ear canal normal. Tympanic membrane is erythematous and bulging. Tympanic membrane is not perforated. Decreased hearing (Chronic) is noted.   Left Ear: External ear and ear canal normal. Tympanic membrane is erythematous and bulging. Tympanic membrane is not perforated. Decreased hearing (Chronic) is noted.   Nose: Congestion present. No mucosal edema, rhinorrhea or nasal deformity. No epistaxis. Right sinus exhibits maxillary sinus tenderness and frontal sinus tenderness. Left sinus exhibits maxillary sinus tenderness and frontal sinus tenderness.   Mouth/Throat: Uvula is midline, oropharynx is clear and moist and mucous membranes are normal. Mucous membranes are moist. No trismus in the jaw. Normal dentition. No uvula swelling. No oropharyngeal exudate or posterior oropharyngeal erythema. Oropharynx is clear.   Eyes: Conjunctivae and lids are normal. Pupils are equal, round, and reactive to light. Right eye exhibits no discharge. Left eye exhibits no discharge. No scleral icterus.   Neck: Trachea normal and phonation normal. Neck supple. No neck rigidity present.   Cardiovascular: Normal rate, regular rhythm and normal pulses.   Pulmonary/Chest: Effort normal. Tachypnea noted. No respiratory distress (Equal rise and fall of chest.). She has decreased breath sounds. She has no wheezes. She has no rhonchi. She has no rales.    Abdominal: Normal appearance and bowel sounds are normal. She exhibits no distension. Soft. There is no abdominal tenderness.   Musculoskeletal: Normal range of motion.         General: Normal range of motion.      Cervical back: She exhibits no tenderness.   Lymphadenopathy:     She has no cervical adenopathy.   Neurological: She is alert and oriented to person, place, and time. She exhibits normal muscle tone.   Skin: Skin is warm, dry, intact, not diaphoretic, not pale and no rash. Capillary refill takes 2 to 3 seconds. No erythema   Psychiatric: Her speech is normal and behavior is normal. Judgment and thought content normal.   Nursing note and vitals reviewed.      Assessment:     1. Cough, unspecified type    2. Acute otitis media, bilateral    3. Acute non-recurrent pansinusitis    4. COPD with acute exacerbation        Plan:       Cough, unspecified type  -     SARS Coronavirus 2 Antigen, POCT Manual Read  -     XR CHEST PA AND LATERAL; Future; Expected date: 02/07/2025  -     POCT Influenza A/B Rapid Antigen    Acute otitis media, bilateral    Acute non-recurrent pansinusitis    COPD with acute exacerbation    Other orders  -     doxycycline (VIBRA-TABS) 100 MG tablet; Take 1 tablet (100 mg total) by mouth 2 (two) times daily. for 10 days  Dispense: 20 tablet; Refill: 0  -     predniSONE (DELTASONE) 20 MG tablet; Take 2 tablets (40 mg total) by mouth once daily. for 5 days  Dispense: 10 tablet; Refill: 0  -     pyrilamine-chlophedianoL (NINJACOF) 12.5-12.5 mg/5 mL Liqd; Take 5-10 mLs by mouth every 6 to 8 hours as needed (Cough).  Dispense: 118 mL; Refill: 0  -     ipratropium-albuteroL (COMBIVENT)  mcg/actuation inhaler; Inhale 2 puffs into the lungs every 6 (six) hours as needed for Wheezing or Shortness of Breath. Rescue  Dispense: 4 g; Refill: 0             Additional MDM:     Heart Failure Score:   COPD = Yes (History)        Labs:  Influenza a and B negative.  COVID negative.    Chest x-ray:  The patient has had a TAVR with a metal frame work in place. A loop recorder is noted. The cardiac size and contours normal. No intrapulmonary mass or infiltrate is seen. There is no pneumothorax or pleural effusion.   Take medications as prescribed.  Tylenol/Motrin per package instructions for any pain or fever.  Assure adequate hydration and rest.  Throat lozenges or Chloraseptic per package instructions for sore throat.    Warm salt water gargles every 2-3 hours as needed for sore throat.    Nasal saline flushes or irrigation as directed for nasal saline congestion and sinus related symptoms.  Follow-up with PCP in 1-2 days.  Return to clinic as needed.  To ED for any new or acutely worsening symptoms.  Patient in agreement with plan of care.    DISCLAIMER: Please note that my documentation in this Electronic Healthcare Record was produced using speech recognition software and therefore may contain errors related to that software system.These could include grammar, punctuation and spelling errors or the inclusion/exclusion of phrases that were not intended. Garbled syntax, mangled pronouns, and other bizarre constructions may be attributed to that software system.

## 2025-02-11 LAB
OHS CV AF BURDEN PERCENT: < 1
OHS CV DC REMOTE DEVICE TYPE: NORMAL

## 2025-02-24 ENCOUNTER — HOSPITAL ENCOUNTER (OUTPATIENT)
Dept: CARDIOLOGY | Facility: CLINIC | Age: 70
Discharge: HOME | End: 2025-02-24
Attending: INTERNAL MEDICINE
Payer: MEDICARE

## 2025-02-24 ENCOUNTER — CLINICAL SUPPORT (OUTPATIENT)
Dept: CARDIOLOGY | Facility: CLINIC | Age: 70
End: 2025-02-24

## 2025-02-24 DIAGNOSIS — I49.8 OTHER SPECIFIED CARDIAC ARRHYTHMIAS: ICD-10-CM

## 2025-02-24 PROCEDURE — 93298 REM INTERROG DEV EVAL SCRMS: CPT | Mod: PN | Performed by: INTERNAL MEDICINE

## 2025-03-10 RX ORDER — IPRATROPIUM BROMIDE AND ALBUTEROL 20; 100 UG/1; UG/1
SPRAY, METERED RESPIRATORY (INHALATION)
Qty: 4 G | Refills: 0 | Status: SHIPPED | OUTPATIENT
Start: 2025-03-10

## 2025-03-13 ENCOUNTER — TELEPHONE (OUTPATIENT)
Dept: PULMONOLOGY | Facility: CLINIC | Age: 70
End: 2025-03-13
Payer: MEDICARE

## 2025-03-13 NOTE — TELEPHONE ENCOUNTER
Spoke to patient.  New Patient appt scheduled.  I have put her on a waitlist.  I did advise that if she is struggling to go to there near ER.  Pt v/u.

## 2025-03-13 NOTE — TELEPHONE ENCOUNTER
----- Message from Jo sent at 3/13/2025 11:22 AM CDT -----  Type:  Same Day Appointment RequestCaller is requesting a same day appointment.  Caller declined first available appointment listed below.  Name of Caller:CONSUELO When is the first available appointment?MAYSymptoms:PULMONARY EMBOLISMBest Call Back Number: [phone] Information:

## 2025-03-22 ENCOUNTER — HOSPITAL ENCOUNTER (INPATIENT)
Facility: HOSPITAL | Age: 70
LOS: 2 days | Discharge: HOME HEALTH | DRG: 190 | End: 2025-03-25
Attending: STUDENT IN AN ORGANIZED HEALTH CARE EDUCATION/TRAINING PROGRAM | Admitting: INTERNAL MEDICINE
Payer: MEDICARE

## 2025-03-22 DIAGNOSIS — Z95.3 S/P TAVR (TRANSCATHETER AORTIC VALVE REPLACEMENT), BIOPROSTHETIC: ICD-10-CM

## 2025-03-22 DIAGNOSIS — R06.02 SOB (SHORTNESS OF BREATH): ICD-10-CM

## 2025-03-22 DIAGNOSIS — R07.9 CHEST PAIN: ICD-10-CM

## 2025-03-22 DIAGNOSIS — J44.1 COPD EXACERBATION: ICD-10-CM

## 2025-03-22 DIAGNOSIS — Z86.79 HISTORY OF AORTIC STENOSIS: ICD-10-CM

## 2025-03-22 DIAGNOSIS — Z86.73 HISTORY OF CVA (CEREBROVASCULAR ACCIDENT): ICD-10-CM

## 2025-03-22 DIAGNOSIS — H91.90 HEARING LOSS, UNSPECIFIED HEARING LOSS TYPE, UNSPECIFIED LATERALITY: ICD-10-CM

## 2025-03-22 DIAGNOSIS — J96.01 ACUTE HYPOXEMIC RESPIRATORY FAILURE: Primary | ICD-10-CM

## 2025-03-22 DIAGNOSIS — D72.829 LEUKOCYTOSIS, UNSPECIFIED TYPE: ICD-10-CM

## 2025-03-22 DIAGNOSIS — Z95.818 STATUS POST PLACEMENT OF IMPLANTABLE LOOP RECORDER: ICD-10-CM

## 2025-03-22 LAB
ABSOLUTE EOSINOPHIL (SMH): 0.93 K/UL
ABSOLUTE MONOCYTE (SMH): 0.66 K/UL (ref 0.3–1)
ABSOLUTE NEUTROPHIL COUNT (SMH): 4.3 K/UL (ref 1.8–7.7)
ALBUMIN SERPL-MCNC: 4 G/DL (ref 3.5–5.2)
ALP SERPL-CCNC: 57 UNIT/L (ref 55–135)
ALT SERPL-CCNC: 11 UNIT/L (ref 10–44)
ANION GAP (SMH): 10 MMOL/L (ref 8–16)
AST SERPL-CCNC: 20 UNIT/L (ref 10–40)
BASOPHILS # BLD AUTO: 0.06 K/UL
BASOPHILS NFR BLD AUTO: 0.7 %
BILIRUB SERPL-MCNC: 0.6 MG/DL (ref 0.1–1)
BUN SERPL-MCNC: 15 MG/DL (ref 8–23)
CALCIUM SERPL-MCNC: 9.6 MG/DL (ref 8.7–10.5)
CHLORIDE SERPL-SCNC: 105 MMOL/L (ref 95–110)
CO2 SERPL-SCNC: 25 MMOL/L (ref 23–29)
CREAT SERPL-MCNC: 1.3 MG/DL (ref 0.5–1.4)
ERYTHROCYTE [DISTWIDTH] IN BLOOD BY AUTOMATED COUNT: 14 % (ref 11.5–14.5)
GFR SERPLBLD CREATININE-BSD FMLA CKD-EPI: 44 ML/MIN/1.73/M2
GLUCOSE SERPL-MCNC: 129 MG/DL (ref 70–110)
HCT VFR BLD AUTO: 46.6 % (ref 37–48.5)
HGB BLD-MCNC: 15.2 GM/DL (ref 12–16)
IMM GRANULOCYTES # BLD AUTO: 0.06 K/UL (ref 0–0.04)
IMM GRANULOCYTES NFR BLD AUTO: 0.7 % (ref 0–0.5)
LYMPHOCYTES # BLD AUTO: 3.04 K/UL (ref 1–4.8)
MCH RBC QN AUTO: 29.8 PG (ref 27–31)
MCHC RBC AUTO-ENTMCNC: 32.6 G/DL (ref 32–36)
MCV RBC AUTO: 91 FL (ref 82–98)
NUCLEATED RBC (/100WBC) (SMH): 0 /100 WBC
PLATELET # BLD AUTO: 71 K/UL (ref 150–450)
PMV BLD AUTO: 12.1 FL (ref 9.2–12.9)
POTASSIUM SERPL-SCNC: 3.8 MMOL/L (ref 3.5–5.1)
PROT SERPL-MCNC: 6.8 GM/DL (ref 6–8.4)
RBC # BLD AUTO: 5.1 M/UL (ref 4–5.4)
RELATIVE EOSINOPHIL (SMH): 10.3 % (ref 0–8)
RELATIVE LYMPHOCYTE (SMH): 33.8 % (ref 18–48)
RELATIVE MONOCYTE (SMH): 7.3 % (ref 4–15)
RELATIVE NEUTROPHIL (SMH): 47.2 % (ref 38–73)
SODIUM SERPL-SCNC: 140 MMOL/L (ref 136–145)
TROPONIN HIGH SENSITIVE (SMH): 10.3 PG/ML
WBC # BLD AUTO: 9 K/UL (ref 3.9–12.7)

## 2025-03-22 PROCEDURE — 84484 ASSAY OF TROPONIN QUANT: CPT

## 2025-03-22 PROCEDURE — 99285 EMERGENCY DEPT VISIT HI MDM: CPT | Mod: 25

## 2025-03-22 PROCEDURE — 93005 ELECTROCARDIOGRAM TRACING: CPT | Performed by: GENERAL PRACTICE

## 2025-03-22 PROCEDURE — 83880 ASSAY OF NATRIURETIC PEPTIDE: CPT

## 2025-03-22 PROCEDURE — 80053 COMPREHEN METABOLIC PANEL: CPT

## 2025-03-22 PROCEDURE — 85025 COMPLETE CBC W/AUTO DIFF WBC: CPT

## 2025-03-22 PROCEDURE — 93010 ELECTROCARDIOGRAM REPORT: CPT | Mod: ,,, | Performed by: GENERAL PRACTICE

## 2025-03-22 RX ORDER — IPRATROPIUM BROMIDE AND ALBUTEROL SULFATE 2.5; .5 MG/3ML; MG/3ML
3 SOLUTION RESPIRATORY (INHALATION)
Status: DISPENSED | OUTPATIENT
Start: 2025-03-23 | End: 2025-03-23

## 2025-03-22 RX ORDER — DROPERIDOL 2.5 MG/ML
0.62 INJECTION, SOLUTION INTRAMUSCULAR; INTRAVENOUS ONCE
Status: COMPLETED | OUTPATIENT
Start: 2025-03-23 | End: 2025-03-23

## 2025-03-22 RX ORDER — PREDNISONE 20 MG/1
60 TABLET ORAL
Status: COMPLETED | OUTPATIENT
Start: 2025-03-23 | End: 2025-03-23

## 2025-03-22 NOTE — Clinical Note
Diagnosis: SOB (shortness of breath) [114523]   Future Attending Provider: KAYLIE STAUFFER [[id number]]   Admit to which facility:: Critical access hospital [[id number]]   Reason for IP Medical Treatment  (Clinical interventions that can only be accomplished in the IP setting? ) :: copd exacerbation   Plans for Post-Acute care--if anticipated (pick the single best option):: A. No post acute care anticipated at this time   Special Needs:: No Special Needs [1]

## 2025-03-23 PROBLEM — J96.01 ACUTE HYPOXEMIC RESPIRATORY FAILURE: Status: ACTIVE | Noted: 2025-03-23

## 2025-03-23 PROBLEM — R06.02 SOB (SHORTNESS OF BREATH): Status: ACTIVE | Noted: 2025-03-23

## 2025-03-23 PROBLEM — J44.1 COPD EXACERBATION: Status: ACTIVE | Noted: 2025-03-23

## 2025-03-23 LAB
ABSOLUTE EOSINOPHIL (SMH): 0.05 K/UL
ABSOLUTE MONOCYTE (SMH): 0.18 K/UL (ref 0.3–1)
ABSOLUTE NEUTROPHIL COUNT (SMH): 6.5 K/UL (ref 1.8–7.7)
ALBUMIN SERPL-MCNC: 3.6 G/DL (ref 3.5–5.2)
ALP SERPL-CCNC: 51 UNIT/L (ref 55–135)
ALT SERPL-CCNC: 9 UNIT/L (ref 10–44)
ANION GAP (SMH): 6 MMOL/L (ref 8–16)
AST SERPL-CCNC: 14 UNIT/L (ref 10–40)
BASOPHILS # BLD AUTO: 0.05 K/UL
BASOPHILS NFR BLD AUTO: 0.7 %
BILIRUB SERPL-MCNC: 0.4 MG/DL (ref 0.1–1)
BNP SERPL-MCNC: 30 PG/ML
BUN SERPL-MCNC: 16 MG/DL (ref 8–23)
CALCIUM SERPL-MCNC: 9.1 MG/DL (ref 8.7–10.5)
CHLORIDE SERPL-SCNC: 108 MMOL/L (ref 95–110)
CO2 SERPL-SCNC: 24 MMOL/L (ref 23–29)
CREAT SERPL-MCNC: 1.2 MG/DL (ref 0.5–1.4)
ERYTHROCYTE [DISTWIDTH] IN BLOOD BY AUTOMATED COUNT: 14 % (ref 11.5–14.5)
GFR SERPLBLD CREATININE-BSD FMLA CKD-EPI: 49 ML/MIN/1.73/M2
GLUCOSE SERPL-MCNC: 153 MG/DL (ref 70–110)
HCT VFR BLD AUTO: 40.7 % (ref 37–48.5)
HGB BLD-MCNC: 13.1 GM/DL (ref 12–16)
IMM GRANULOCYTES # BLD AUTO: 0.02 K/UL (ref 0–0.04)
IMM GRANULOCYTES NFR BLD AUTO: 0.3 % (ref 0–0.5)
LYMPHOCYTES # BLD AUTO: 0.9 K/UL (ref 1–4.8)
MAGNESIUM SERPL-MCNC: 2 MG/DL (ref 1.6–2.6)
MCH RBC QN AUTO: 30.5 PG (ref 27–31)
MCHC RBC AUTO-ENTMCNC: 32.2 G/DL (ref 32–36)
MCV RBC AUTO: 95 FL (ref 82–98)
NUCLEATED RBC (/100WBC) (SMH): 0 /100 WBC
PLATELET # BLD AUTO: 145 K/UL (ref 150–450)
PLATELET BLD QL SMEAR: NORMAL
PMV BLD AUTO: 11.5 FL (ref 9.2–12.9)
POTASSIUM SERPL-SCNC: 3.8 MMOL/L (ref 3.5–5.1)
PROCALCITONIN SERPL-MCNC: 0.08 NG/ML
PROT SERPL-MCNC: 6.1 GM/DL (ref 6–8.4)
RBC # BLD AUTO: 4.29 M/UL (ref 4–5.4)
RELATIVE EOSINOPHIL (SMH): 0.7 % (ref 0–8)
RELATIVE LYMPHOCYTE (SMH): 11.7 % (ref 18–48)
RELATIVE MONOCYTE (SMH): 2.3 % (ref 4–15)
RELATIVE NEUTROPHIL (SMH): 84.3 % (ref 38–73)
SODIUM SERPL-SCNC: 138 MMOL/L (ref 136–145)
WBC # BLD AUTO: 7.67 K/UL (ref 3.9–12.7)

## 2025-03-23 PROCEDURE — 94640 AIRWAY INHALATION TREATMENT: CPT

## 2025-03-23 PROCEDURE — 25000242 PHARM REV CODE 250 ALT 637 W/ HCPCS: Performed by: STUDENT IN AN ORGANIZED HEALTH CARE EDUCATION/TRAINING PROGRAM

## 2025-03-23 PROCEDURE — 99900031 HC PATIENT EDUCATION (STAT)

## 2025-03-23 PROCEDURE — 12000002 HC ACUTE/MED SURGE SEMI-PRIVATE ROOM

## 2025-03-23 PROCEDURE — 25000003 PHARM REV CODE 250: Performed by: INTERNAL MEDICINE

## 2025-03-23 PROCEDURE — 85025 COMPLETE CBC W/AUTO DIFF WBC: CPT | Performed by: INTERNAL MEDICINE

## 2025-03-23 PROCEDURE — 96374 THER/PROPH/DIAG INJ IV PUSH: CPT

## 2025-03-23 PROCEDURE — 63600175 PHARM REV CODE 636 W HCPCS: Performed by: INTERNAL MEDICINE

## 2025-03-23 PROCEDURE — 94799 UNLISTED PULMONARY SVC/PX: CPT

## 2025-03-23 PROCEDURE — 99406 BEHAV CHNG SMOKING 3-10 MIN: CPT

## 2025-03-23 PROCEDURE — 25000003 PHARM REV CODE 250: Performed by: STUDENT IN AN ORGANIZED HEALTH CARE EDUCATION/TRAINING PROGRAM

## 2025-03-23 PROCEDURE — 27000221 HC OXYGEN, UP TO 24 HOURS

## 2025-03-23 PROCEDURE — 84450 TRANSFERASE (AST) (SGOT): CPT | Performed by: INTERNAL MEDICINE

## 2025-03-23 PROCEDURE — 84145 PROCALCITONIN (PCT): CPT | Performed by: INTERNAL MEDICINE

## 2025-03-23 PROCEDURE — 63600175 PHARM REV CODE 636 W HCPCS: Performed by: STUDENT IN AN ORGANIZED HEALTH CARE EDUCATION/TRAINING PROGRAM

## 2025-03-23 PROCEDURE — 99900035 HC TECH TIME PER 15 MIN (STAT)

## 2025-03-23 PROCEDURE — 94761 N-INVAS EAR/PLS OXIMETRY MLT: CPT

## 2025-03-23 PROCEDURE — 25500020 PHARM REV CODE 255: Performed by: STUDENT IN AN ORGANIZED HEALTH CARE EDUCATION/TRAINING PROGRAM

## 2025-03-23 PROCEDURE — 94644 CONT INHLJ TX 1ST HOUR: CPT

## 2025-03-23 PROCEDURE — 25000242 PHARM REV CODE 250 ALT 637 W/ HCPCS: Performed by: INTERNAL MEDICINE

## 2025-03-23 PROCEDURE — 83735 ASSAY OF MAGNESIUM: CPT | Performed by: INTERNAL MEDICINE

## 2025-03-23 RX ORDER — SODIUM,POTASSIUM PHOSPHATES 280-250MG
2 POWDER IN PACKET (EA) ORAL
Status: DISCONTINUED | OUTPATIENT
Start: 2025-03-23 | End: 2025-03-25 | Stop reason: HOSPADM

## 2025-03-23 RX ORDER — IBUPROFEN 200 MG
16 TABLET ORAL
Status: DISCONTINUED | OUTPATIENT
Start: 2025-03-23 | End: 2025-03-23

## 2025-03-23 RX ORDER — LANOLIN ALCOHOL/MO/W.PET/CERES
800 CREAM (GRAM) TOPICAL
Status: DISCONTINUED | OUTPATIENT
Start: 2025-03-23 | End: 2025-03-25 | Stop reason: HOSPADM

## 2025-03-23 RX ORDER — BUDESONIDE 0.5 MG/2ML
0.5 INHALANT ORAL EVERY 12 HOURS
Status: DISCONTINUED | OUTPATIENT
Start: 2025-03-23 | End: 2025-03-25 | Stop reason: HOSPADM

## 2025-03-23 RX ORDER — GLUCAGON 1 MG
1 KIT INJECTION
Status: DISCONTINUED | OUTPATIENT
Start: 2025-03-23 | End: 2025-03-23

## 2025-03-23 RX ORDER — CHOLECALCIFEROL (VITAMIN D3) 25 MCG
1000 TABLET ORAL DAILY
COMMUNITY

## 2025-03-23 RX ORDER — ALUMINUM HYDROXIDE, MAGNESIUM HYDROXIDE, AND SIMETHICONE 1200; 120; 1200 MG/30ML; MG/30ML; MG/30ML
30 SUSPENSION ORAL 4 TIMES DAILY PRN
Status: DISCONTINUED | OUTPATIENT
Start: 2025-03-23 | End: 2025-03-25 | Stop reason: HOSPADM

## 2025-03-23 RX ORDER — GUAIFENESIN 100 MG/5ML
400 LIQUID ORAL 4 TIMES DAILY
Status: DISCONTINUED | OUTPATIENT
Start: 2025-03-23 | End: 2025-03-25 | Stop reason: HOSPADM

## 2025-03-23 RX ORDER — AMOXICILLIN 250 MG
1 CAPSULE ORAL 2 TIMES DAILY
Status: DISCONTINUED | OUTPATIENT
Start: 2025-03-23 | End: 2025-03-25 | Stop reason: HOSPADM

## 2025-03-23 RX ORDER — ATORVASTATIN CALCIUM 40 MG/1
80 TABLET, FILM COATED ORAL NIGHTLY
Status: DISCONTINUED | OUTPATIENT
Start: 2025-03-23 | End: 2025-03-25 | Stop reason: HOSPADM

## 2025-03-23 RX ORDER — FLUTICASONE FUROATE AND VILANTEROL 100; 25 UG/1; UG/1
1 POWDER RESPIRATORY (INHALATION) DAILY
Status: DISCONTINUED | OUTPATIENT
Start: 2025-03-23 | End: 2025-03-23

## 2025-03-23 RX ORDER — IPRATROPIUM BROMIDE AND ALBUTEROL SULFATE 2.5; .5 MG/3ML; MG/3ML
3 SOLUTION RESPIRATORY (INHALATION)
Status: DISCONTINUED | OUTPATIENT
Start: 2025-03-23 | End: 2025-03-25 | Stop reason: HOSPADM

## 2025-03-23 RX ORDER — IBUPROFEN 200 MG
24 TABLET ORAL
Status: DISCONTINUED | OUTPATIENT
Start: 2025-03-23 | End: 2025-03-23

## 2025-03-23 RX ORDER — IPRATROPIUM BROMIDE AND ALBUTEROL SULFATE 2.5; .5 MG/3ML; MG/3ML
3 SOLUTION RESPIRATORY (INHALATION) 4 TIMES DAILY
Status: DISCONTINUED | OUTPATIENT
Start: 2025-03-23 | End: 2025-03-23

## 2025-03-23 RX ORDER — SODIUM CHLORIDE 0.9 % (FLUSH) 0.9 %
3 SYRINGE (ML) INJECTION
Status: DISCONTINUED | OUTPATIENT
Start: 2025-03-23 | End: 2025-03-25 | Stop reason: HOSPADM

## 2025-03-23 RX ORDER — SODIUM CHLORIDE, SODIUM LACTATE, POTASSIUM CHLORIDE, CALCIUM CHLORIDE 600; 310; 30; 20 MG/100ML; MG/100ML; MG/100ML; MG/100ML
INJECTION, SOLUTION INTRAVENOUS CONTINUOUS
Status: DISCONTINUED | OUTPATIENT
Start: 2025-03-23 | End: 2025-03-23

## 2025-03-23 RX ORDER — FAMOTIDINE 10 MG/ML
20 INJECTION, SOLUTION INTRAVENOUS DAILY
Status: DISCONTINUED | OUTPATIENT
Start: 2025-03-23 | End: 2025-03-24

## 2025-03-23 RX ORDER — ENOXAPARIN SODIUM 100 MG/ML
40 INJECTION SUBCUTANEOUS EVERY 24 HOURS
Status: DISCONTINUED | OUTPATIENT
Start: 2025-03-23 | End: 2025-03-25 | Stop reason: HOSPADM

## 2025-03-23 RX ORDER — ARFORMOTEROL TARTRATE 15 UG/2ML
15 SOLUTION RESPIRATORY (INHALATION) 2 TIMES DAILY
Status: DISCONTINUED | OUTPATIENT
Start: 2025-03-23 | End: 2025-03-25 | Stop reason: HOSPADM

## 2025-03-23 RX ORDER — FAMOTIDINE 10 MG/ML
20 INJECTION, SOLUTION INTRAVENOUS 2 TIMES DAILY
Status: DISCONTINUED | OUTPATIENT
Start: 2025-03-23 | End: 2025-03-23

## 2025-03-23 RX ORDER — ASPIRIN 81 MG/1
81 TABLET ORAL DAILY
Status: DISCONTINUED | OUTPATIENT
Start: 2025-03-23 | End: 2025-03-25 | Stop reason: HOSPADM

## 2025-03-23 RX ORDER — IPRATROPIUM BROMIDE AND ALBUTEROL SULFATE 2.5; .5 MG/3ML; MG/3ML
3 SOLUTION RESPIRATORY (INHALATION) EVERY 6 HOURS PRN
Status: ON HOLD | COMMUNITY
Start: 2024-07-24 | End: 2025-03-30

## 2025-03-23 RX ORDER — ALBUTEROL SULFATE 90 UG/1
2 INHALANT RESPIRATORY (INHALATION) EVERY 6 HOURS PRN
Status: ON HOLD | COMMUNITY
End: 2025-03-30

## 2025-03-23 RX ORDER — ACETAMINOPHEN 325 MG/1
650 TABLET ORAL EVERY 4 HOURS PRN
Status: DISCONTINUED | OUTPATIENT
Start: 2025-03-23 | End: 2025-03-25 | Stop reason: HOSPADM

## 2025-03-23 RX ORDER — TALC
6 POWDER (GRAM) TOPICAL NIGHTLY PRN
Status: DISCONTINUED | OUTPATIENT
Start: 2025-03-23 | End: 2025-03-25 | Stop reason: HOSPADM

## 2025-03-23 RX ORDER — LEVOFLOXACIN 750 MG/1
750 TABLET ORAL ONCE
Status: COMPLETED | OUTPATIENT
Start: 2025-03-23 | End: 2025-03-23

## 2025-03-23 RX ORDER — ACETAMINOPHEN 325 MG/1
650 TABLET ORAL EVERY 8 HOURS PRN
Status: DISCONTINUED | OUTPATIENT
Start: 2025-03-23 | End: 2025-03-23

## 2025-03-23 RX ORDER — ONDANSETRON HYDROCHLORIDE 2 MG/ML
4 INJECTION, SOLUTION INTRAVENOUS EVERY 6 HOURS PRN
Status: DISCONTINUED | OUTPATIENT
Start: 2025-03-23 | End: 2025-03-25 | Stop reason: HOSPADM

## 2025-03-23 RX ORDER — NALOXONE HCL 0.4 MG/ML
0.02 VIAL (ML) INJECTION
Status: DISCONTINUED | OUTPATIENT
Start: 2025-03-23 | End: 2025-03-25 | Stop reason: HOSPADM

## 2025-03-23 RX ORDER — SODIUM CHLORIDE 0.9 % (FLUSH) 0.9 %
3 SYRINGE (ML) INJECTION EVERY 12 HOURS PRN
Status: DISCONTINUED | OUTPATIENT
Start: 2025-03-23 | End: 2025-03-25 | Stop reason: HOSPADM

## 2025-03-23 RX ADMIN — LEVOFLOXACIN 750 MG: 750 TABLET, FILM COATED ORAL at 05:03

## 2025-03-23 RX ADMIN — ASPIRIN 81 MG: 81 TABLET, COATED ORAL at 08:03

## 2025-03-23 RX ADMIN — ATORVASTATIN CALCIUM 80 MG: 40 TABLET, FILM COATED ORAL at 08:03

## 2025-03-23 RX ADMIN — SODIUM CHLORIDE, POTASSIUM CHLORIDE, SODIUM LACTATE AND CALCIUM CHLORIDE: 600; 310; 30; 20 INJECTION, SOLUTION INTRAVENOUS at 05:03

## 2025-03-23 RX ADMIN — GUAIFENESIN 400 MG: 200 SOLUTION ORAL at 08:03

## 2025-03-23 RX ADMIN — IOHEXOL 100 ML: 350 INJECTION, SOLUTION INTRAVENOUS at 12:03

## 2025-03-23 RX ADMIN — BUDESONIDE INHALATION 0.5 MG: 0.5 SUSPENSION RESPIRATORY (INHALATION) at 07:03

## 2025-03-23 RX ADMIN — ACETAMINOPHEN 650 MG: 325 TABLET ORAL at 09:03

## 2025-03-23 RX ADMIN — ENOXAPARIN SODIUM 40 MG: 40 INJECTION SUBCUTANEOUS at 05:03

## 2025-03-23 RX ADMIN — METHYLPREDNISOLONE SODIUM SUCCINATE 80 MG: 40 INJECTION, POWDER, FOR SOLUTION INTRAMUSCULAR; INTRAVENOUS at 05:03

## 2025-03-23 RX ADMIN — GUAIFENESIN 400 MG: 200 SOLUTION ORAL at 05:03

## 2025-03-23 RX ADMIN — FAMOTIDINE 20 MG: 10 INJECTION, SOLUTION INTRAVENOUS at 05:03

## 2025-03-23 RX ADMIN — AZITHROMYCIN MONOHYDRATE 500 MG: 500 INJECTION, POWDER, LYOPHILIZED, FOR SOLUTION INTRAVENOUS at 05:03

## 2025-03-23 RX ADMIN — IPRATROPIUM BROMIDE AND ALBUTEROL SULFATE 3 ML: .5; 3 SOLUTION RESPIRATORY (INHALATION) at 07:03

## 2025-03-23 RX ADMIN — SENNOSIDES, DOCUSATE SODIUM 1 TABLET: 50; 8.6 TABLET, FILM COATED ORAL at 08:03

## 2025-03-23 RX ADMIN — DROPERIDOL 0.62 MG: 2.5 INJECTION, SOLUTION INTRAMUSCULAR; INTRAVENOUS at 12:03

## 2025-03-23 RX ADMIN — PREDNISONE 60 MG: 20 TABLET ORAL at 12:03

## 2025-03-23 RX ADMIN — ARFORMOTEROL TARTRATE 15 MCG: 15 SOLUTION RESPIRATORY (INHALATION) at 07:03

## 2025-03-23 RX ADMIN — IPRATROPIUM BROMIDE AND ALBUTEROL SULFATE 9 ML: .5; 3 SOLUTION RESPIRATORY (INHALATION) at 12:03

## 2025-03-23 RX ADMIN — IPRATROPIUM BROMIDE AND ALBUTEROL SULFATE 3 ML: .5; 3 SOLUTION RESPIRATORY (INHALATION) at 01:03

## 2025-03-23 NOTE — HPI
69-year-old presenting with shortness of breath. a past medical history of a TAVR in 2022, A-fib with an loop recorder placed in June 2024, CVA x 2 with no residual deficits and COPD (denies home O2 use).  About 1 month ago she was admitted to an outside hospital for COPD exacerbation.  She reports she feels that she is having the same today.  She continues to smoke although she says she has decreased the amount that she smokes significantly but when stressed reaches for a cigarette. Says she has to keep her inhaler close by as she is using it almost constatntly the last few days. Has long coughing spells feels that she may pass out. Was mid-80's on o2 sat on room air on arrival. Was told based on vq scan she may have pe. Does not want to take eliquis as she is scared she will  fall and bleed out. Hx also from her partner at bedside     Still smokes some   Non drinker    Lives at home with     She says she was better after she left last hospital 2/25/2025    Treated for COPD exacerbation   Sent home with steroid taper and abx    Got IV Doxy in hospital .  And then given zithromax note says         She said she did take all her meds at home    Said she was getting very dizzy once she got home .  With movement . Bending over or standing up .        No SOB     But then started having bad coughing spells     And more wheezing again     So she came to our ER         Impression: CTA chest      1. No evidence of pulmonary artery thromboembolism through the segmental levels.  No acute intrathoracic abnormality.  2. Postoperative change of prior TAVR.  Cardiac loop recorder present within the left extra thoracic soft tissues.  3. Right middle lobe 3 mm pulmonary nodule.  For a solid nodule <6 mm, Fleischner Society 2017 guidelines recommend no routine follow up for a low risk patient, or follow-up with non-contrast chest CT at 12 months in a high risk patient.          IN OUR ER     Labs were normal     CTA results  above   No PE   No PNA    She was Hypoxemic when she came sats in 80s     But since treated in ER was able to go to room air at rest       Got prednisone in ER  And Nebs       Plan is to admit for copd exacerbation

## 2025-03-23 NOTE — H&P
[facility] - Emergency Dept  Hospital Medicine  History & Physical    Patient Name: Elissa Raymond  MRN: [id number]  Patient Class: IP- Inpatient  Admission Date: 3/22/2025  Attending Physician: Radames Cano MD  Primary Care Provider: Scar Hussein MD         Patient information was obtained from patient and ER records.     Subjective:     Principal Problem:COPD exacerbation    Chief Complaint:   Chief Complaint   Patient presents with    Fatigue     INCREASING OVER 3 WEEKS, SOB INCREASED TODAY, DECREASED SPO2 ER EMS, DRY COUGH FOR SEVERAL MONTHS         HPI: 69-year-old presenting with shortness of breath. a past medical history of a TAVR in 2022, A-fib with an loop recorder placed in June 2024, CVA x 2 with no residual deficits and COPD (denies home O2 use).  About 1 month ago she was admitted to an outside hospital for COPD exacerbation.  She reports she feels that she is having the same today.  She continues to smoke although she says she has decreased the amount that she smokes significantly but when stressed reaches for a cigarette. Says she has to keep her inhaler close by as she is using it almost constatntly the last few days. Has long coughing spells feels that she may pass out. Was mid-80's on o2 sat on room air on arrival. Was told based on vq scan she may have pe. Does not want to take eliquis as she is scared she will  fall and bleed out. Hx also from her partner at bedside     Still smokes some   Non drinker    Lives at home with     She says she was better after she left last hospital 2/25/2025    Treated for COPD exacerbation   Sent home with steroid taper and abx    Got IV Doxy in hospital .  And then given zithromax note says         She said she did take all her meds at home    Said she was getting very dizzy once she got home .  With movement . Bending over or standing up .        No SOB     But then started having bad coughing spells     And more wheezing again     So  she came to our ER         Impression: CTA chest      1. No evidence of pulmonary artery thromboembolism through the segmental levels.  No acute intrathoracic abnormality.  2. Postoperative change of prior TAVR.  Cardiac loop recorder present within the left extra thoracic soft tissues.  3. Right middle lobe 3 mm pulmonary nodule.  For a solid nodule <6 mm, Fleischner Society 2017 guidelines recommend no routine follow up for a low risk patient, or follow-up with non-contrast chest CT at 12 months in a high risk patient.          IN OUR ER     Labs were normal     CTA results above   No PE   No PNA    She was Hypoxemic when she came sats in 80s     But since treated in ER was able to go to room air at rest       Got prednisone in ER  And Nebs       Plan is to admit for copd exacerbation     Past Medical History:   Diagnosis Date    A-fib     Blind in both eyes     Hyperlipidemia     Hypertension     Stroke     x 3    Tobacco use 11/02/2017    Unspecified macular degeneration     Unspecified macular degeneration        Past Surgical History:   Procedure Laterality Date    ANGIOGRAPHY OF LOWER EXTREMITY N/A 11/01/2023    Procedure: ANGIOGRAM BILATERLA LOWER;  Surgeon: Martin Hair MD;  Location: [facility] CATH/EP LAB;  Service: General;  Laterality: N/A;    AORTIC VALVE REPLACEMENT      AORTOGRAPHY WITH EXTREMITY RUNOFF N/A 11/01/2023    Procedure: AORTOGRAM, WITH EXTREMITY RUNOFF;  Surgeon: Martin Hair MD;  Location: [facility] CATH/EP LAB;  Service: General;  Laterality: N/A;    BLADDER SURGERY      BLADDER SURGERY      Lift.    CATARACT EXTRACTION      CHOLECYSTECTOMY      HYSTERECTOMY      INSERTION OF IMPLANTABLE LOOP RECORDER N/A 6/7/2024    Procedure: Insertion, Implantable Loop Recorder;  Surgeon: Todd Agee MD;  Location: [facility] CATH/EP LAB;  Service: Cardiology;  Laterality: N/A;    KNEE SURGERY Left     TONSILLECTOMY         Review of patient's allergies indicates:   Allergen Reactions    Pcn  [penicillins] Other (See Comments)     Unable to obtain       No current facility-administered medications on file prior to encounter.     Current Outpatient Medications on File Prior to Encounter   Medication Sig    aspirin (ECOTRIN) 81 MG EC tablet Take 1 tablet (81 mg total) by mouth once daily.    atorvastatin (LIPITOR) 40 MG tablet Take 2 tablets (80 mg total) by mouth every evening.    budesonide-formoterol 160-4.5 mcg (SYMBICORT) 160-4.5 mcg/actuation HFAA Inhale 2 puffs into the lungs 2 (two) times daily.    CAPSICUM, CAYENNE, ORAL Take 1 capsule by mouth Daily.    cholecalciferol, vitamin D3, 1,250 mcg (50,000 unit) capsule Take 50,000 Units by mouth.    cinnamon bark (CINNAMON) 500 mg capsule Take 500 mg by mouth once daily.    coenzyme Q10 100 mg capsule Take 2 capsules (200 mg total) by mouth once daily.    COMBIVENT RESPIMAT  mcg/actuation inhaler INHALE 2 PUFFS INTO THE LUNGS EVERY 6 HOURS AS NEEDED FOR WHEEZING OR SHORTNESS OF BREATH OR RESCUE    ginger root (GINGER EXTRACT ORAL) Take 1 capsule by mouth once daily.    nicotine (NICODERM CQ) 21 mg/24 hr Place 1 patch onto the skin once daily.    OXYGEN-AIR DELIVERY SYSTEMS MISC by [facility].(Non-Drug; Combo Route) route. 3 L by nasal route    pyrilamine-chlophedianoL (NINJACOF) 12.5-12.5 mg/5 mL Liqd Take 5-10 mLs by mouth every 6 to 8 hours as needed (Cough).     Family History       Problem Relation (Age of Onset)    Cancer Father    Diabetes Mother    Glaucoma Mother, Sister    Kidney disease Mother    Macular degeneration Mother, Sister, Brother          Tobacco Use    Smoking status: Every Day     Current packs/day: 1.00     Average packs/day: 1 pack/day for 55.2 years (55.2 ttl pk-yrs)     Types: Cigarettes     Start date: 1/1/1970    Smokeless tobacco: Never    Tobacco comments:     Pt has smoked now for 54 years and smokes 1pk/day. Down from 4pk/day in the past.  States she has quit since her sickness and it's been a week now. Plans to stay  quit.   Substance and Sexual Activity    Alcohol use: Not on file    Drug use: No    Sexual activity: Not on file     Review of Systems   All other systems reviewed and are negative.    Objective:     Vital Signs (Most Recent):  Temp: 97.9 °F (36.6 °C) (03/22/25 2229)  Pulse: 80 (03/23/25 0716)  Resp: (!) 21 (03/23/25 0716)  BP: (!) 143/71 (03/23/25 0700)  SpO2: 97 % (03/23/25 0716) Vital Signs (24h Range):  Temp:  [97.9 °F (36.6 °C)] 97.9 °F (36.6 °C)  Pulse:  [76-96] 80  Resp:  [18-24] 21  SpO2:  [89 %-98 %] 97 %  BP: (116-148)/(56-77) 143/71     Weight: 68 kg (150 lb)  Body mass index is 27.44 kg/m².     Physical Exam  Vitals and nursing note reviewed. Exam conducted with a chaperone present.   HENT:      Head: Normocephalic.      Nose: Nose normal.      Mouth/Throat:      Mouth: Mucous membranes are moist.   Eyes:      Extraocular Movements: Extraocular movements intact.      Pupils: Pupils are equal, round, and reactive to light.   Cardiovascular:      Rate and Rhythm: Normal rate and regular rhythm.      Pulses: Normal pulses.      Heart sounds: Normal heart sounds.   Pulmonary:      Effort: Pulmonary effort is normal.      Breath sounds: Wheezing present.   Abdominal:      General: Abdomen is flat. Bowel sounds are normal.      Palpations: Abdomen is soft.   Musculoskeletal:         General: Normal range of motion.      Cervical back: Normal range of motion.   Skin:     General: Skin is warm.      Capillary Refill: Capillary refill takes less than 2 seconds.   Neurological:      General: No focal deficit present.      Mental Status: She is alert and oriented to person, place, and time.   Psychiatric:         Mood and Affect: Mood normal.              CRANIAL NERVES     CN III, IV, VI   Pupils are equal, round, and reactive to light.       Significant Labs: All pertinent labs within the past 24 hours have been reviewed.  CBC:   Recent Labs   Lab 03/22/25  2242 03/23/25  0523   WBC 9.00 7.67   HGB 15.2 13.1  "  HCT 46.6 40.7   PLT 71* 145*     CMP:   Recent Labs   Lab 03/22/25  2242 03/23/25  0523    138   K 3.8 3.8   CO2 25 24   BUN 15 16   CREATININE 1.3 1.2   CALCIUM 9.6 9.1   ALBUMIN 4.0 3.6   BILITOT 0.6 0.4   ALKPHOS 57 51*   AST 20 14   ALT 11 9*     Cardiac Markers:   Recent Labs   Lab 03/22/25  2242   BNP 30     Coagulation: No results for input(s): "PT", "INR", "APTT" in the last 48 hours.  Lactic Acid: No results for input(s): "LACTATE" in the last 48 hours.  Lipase: No results for input(s): "LIPASE" in the last 48 hours.  Magnesium:   Recent Labs   Lab 03/23/25  0523   MG 2.0     Troponin: No results for input(s): "TROPONINI", "TROPONINIHS" in the last 48 hours.  TSH: No results for input(s): "TSH" in the last 4320 hours.  Urine Studies: No results for input(s): "COLORU", "APPEARANCEUA", "PHUR", "SPECGRAV", "PROTEINUA", "GLUCUA", "KETONESU", "BILIRUBINUA", "OCCULTUA", "NITRITE", "UROBILINOGEN", "LEUKOCYTESUR", "RBCUA", "WBCUA", "BACTERIA", "SQUAMEPITHEL", "HYALINECASTS" in the last 48 hours.    Invalid input(s): "WRIGHTSUR"    Significant Imaging: I have reviewed all pertinent imaging results/findings within the past 24 hours.  I have reviewed and interpreted all pertinent imaging results/findings within the past 24 hours.  Assessment/Plan:     * COPD exacerbation  Doing better now     On nasal canula few liters at rest in bed     Was in 80s at first on room air     Copd pathway orderset is in           PLAN    - IV Steroids  solumedrol 80 mg Q8 for now    - continuous pulse ox     - zithromax IV    - check procalcitonin this morning    - DuoNebs QID     - IVFs    - robitussin PO QID       Pepcid IV BID     Reg diet     Acute hypoxemic respiratory failure  From COPD exacerbation     See above     SOB (shortness of breath)    Copd exacerbation     Tobacco dependence  We talked about quitting    She did NOT want nicotine patch       VTE Risk Mitigation (From admission, onward)           Ordered     " enoxaparin injection 40 mg  Every 24 hours        Note to Pharmacy: 39.5 CRCL  boarderline- continue 40 mg lovenox for now    03/23/25 0722     Place sequential compression device  Until discontinued         03/23/25 0358     IP VTE HIGH RISK PATIENT  Once         03/23/25 0358     Place sequential compression device  Until discontinued         03/23/25 0358                               Automatic Inhaler to Nebulizer Interchange    fluticasone/vilanterol (Breo Ellipta) 100 mcg/25 mcg changed to budesonide 0.5 mg twice daily AND arformoterol 15 mcg twice daily per [facility] Automatic Therapeutic Substitutions Protocol.    Please contact pharmacy at extension [phone] with any questions.     Thank you,   Mary Jane Cano MD  Department of Hospital Medicine  [facility] - Emergency Dept

## 2025-03-23 NOTE — PROGRESS NOTES
Pharmacist Renal Adjustment Note    Elissa Raymond is a 69 y.o. female being treated with Famotidine.    Patient Data:    Vital Signs (Most Recent):  Temp: 97.9 °F (36.6 °C) (03/22/25 2229)  Pulse: 83 (03/23/25 0330)  Resp: (!) 22 (03/23/25 0300)  BP: 137/61 (03/23/25 0330)  SpO2: 95 % (03/23/25 0330) Vital Signs (72h Range):  Temp:  [97.9 °F (36.6 °C)]   Pulse:  [76-96]   Resp:  [19-24]   BP: (116-140)/(56-77)   SpO2:  [89 %-97 %]      Recent Labs   Lab 03/22/25 2242   CREATININE 1.3     Serum creatinine: 1.3 mg/dL 03/22/25 2242  Estimated creatinine clearance: 36.4 mL/min    Famotidine 20 mg IV every 12 hours will be changed to Famotidine 20 mg IV every 24 hours due to CrCl < 50 mL/min per Renal Dose Adjustment protocol.     Pharmacist's Name: Rosangela Ayoub  Pharmacist's Extension: [phone]

## 2025-03-23 NOTE — PROGRESS NOTES
Automatic Inhaler to Nebulizer Interchange    fluticasone/vilanterol (Breo Ellipta) 100 mcg/25 mcg changed to budesonide 0.5 mg twice daily AND arformoterol 15 mcg twice daily per [facility] Automatic Therapeutic Substitutions Protocol.    Please contact pharmacy at extension [phone] with any questions.     Thank you,   Mary Jane Medina

## 2025-03-23 NOTE — CARE UPDATE
03/23/25 0711   Patient Assessment/Suction   Level of Consciousness (AVPU) alert   Respiratory Effort Normal;Unlabored   Expansion/Accessory Muscles/Retractions no use of accessory muscles   All Lung Fields Breath Sounds wheezes, expiratory   Rhythm/Pattern, Respiratory unlabored   Cough Frequency infrequent   Cough Type dry   PRE-TX-O2   Device (Oxygen Therapy) nasal cannula   $ Is the patient on Low Flow Oxygen? Yes   Flow (L/min) (Oxygen Therapy) 2   SpO2 96 %   Pulse Oximetry Type Continuous   $ Pulse Oximetry - Multiple Charge Pulse Oximetry - Multiple   Pulse 88   Resp (!) 22   Aerosol Therapy   $ Aerosol Therapy Charges Aerosol Treatment   Daily Review of Necessity (SVN) completed   Respiratory Treatment Status (SVN) given   Treatment Route (SVN) mask;air   Patient Position HOB elevated   Post Treatment Assessment (SVN) increased aeration   Signs of Intolerance (SVN) none   Breath Sounds Post-Respiratory Treatment   Throughout All Fields Post-Treatment All Fields   Throughout All Fields Post-Treatment aeration increased   Post-treatment Heart Rate (beats/min) 79   Post-treatment Resp Rate (breaths/min) 21   Education   $ Education Bronchodilator;Oxygen;15 min

## 2025-03-23 NOTE — ASSESSMENT & PLAN NOTE
Doing better now     On nasal canula few liters at rest in bed     Was in 80s at first on room air     Copd pathway orderset is in           PLAN    - IV Steroids  solumedrol 80 mg Q8 for now    - continuous pulse ox     - zithromax IV    - check procalcitonin this morning    - DuoNebs QID     - IVFs    - robitussin PO QID       Pepcid IV BID     Reg diet

## 2025-03-23 NOTE — ED PROVIDER NOTES
Encounter Date: 3/22/2025       History     Chief Complaint   Patient presents with    Fatigue     INCREASING OVER 3 WEEKS, SOB INCREASED TODAY, DECREASED SPO2 ER EMS, DRY COUGH FOR SEVERAL MONTHS      HPI  69-year-old presenting with shortness of breath. a past medical history of a TAVR in 2022, A-fib with an loop recorder placed in June 2024, CVA x 2 with no residual deficits and COPD (denies home O2 use).  About 1 month ago she was admitted to an outside hospital for COPD exacerbation.  She reports she feels that she is having the same today.  She continues to smoke although she says she has decreased the amount that she smokes significantly but when stressed reaches for a cigarette. Says she has to keep her inhaler close by as she is using it almost constatntly the last few days. Has long coughing spells feels that she may pass out. Was mid-80's on o2 sat on room air on arrival. Was told based on vq scan she may have pe. Does not want to take eliquis as she is scared she will  fall and bleed out. Hx also from her partner at bedside   Review of patient's allergies indicates:   Allergen Reactions    Pcn [penicillins] Other (See Comments)     Unable to obtain     Past Medical History:   Diagnosis Date    A-fib     Blind in both eyes     Hyperlipidemia     Hypertension     Stroke     x 3    Tobacco use 11/02/2017    Unspecified macular degeneration     Unspecified macular degeneration      Past Surgical History:   Procedure Laterality Date    ANGIOGRAPHY OF LOWER EXTREMITY N/A 11/01/2023    Procedure: ANGIOGRAM BILATERLA LOWER;  Surgeon: Martin Hair MD;  Location: [facility] CATH/EP LAB;  Service: General;  Laterality: N/A;    AORTIC VALVE REPLACEMENT      AORTOGRAPHY WITH EXTREMITY RUNOFF N/A 11/01/2023    Procedure: AORTOGRAM, WITH EXTREMITY RUNOFF;  Surgeon: Martin Hair MD;  Location: [facility] CATH/EP LAB;  Service: General;  Laterality: N/A;    BLADDER SURGERY      BLADDER SURGERY      Lift.    CATARACT  EXTRACTION      CHOLECYSTECTOMY      HYSTERECTOMY      INSERTION OF IMPLANTABLE LOOP RECORDER N/A 6/7/2024    Procedure: Insertion, Implantable Loop Recorder;  Surgeon: Todd Agee MD;  Location: [facility] CATH/EP LAB;  Service: Cardiology;  Laterality: N/A;    KNEE SURGERY Left     TONSILLECTOMY       Family History   Problem Relation Name Age of Onset    Diabetes Mother      Kidney disease Mother      Glaucoma Mother      Macular degeneration Mother      Cancer Father      Glaucoma Sister      Macular degeneration Sister      Macular degeneration Brother       Social History[1]  Review of Systems  See hpi  Physical Exam     Initial Vitals   BP Pulse Resp Temp SpO2   03/22/25 2232 03/22/25 2229 03/22/25 2229 03/22/25 2229 03/22/25 2229   126/77 96 (!) 24 97.9 °F (36.6 °C) (!) 89 %      MAP       --                Physical Exam    Nursing note and vitals reviewed.  Constitutional: She appears well-developed. She is not diaphoretic.   HENT:   Head: Normocephalic.   Eyes: Right eye exhibits no discharge. Left eye exhibits no discharge. No scleral icterus.   Neck: Neck supple. No tracheal deviation present.   Cardiovascular:  Normal rate and regular rhythm.           Pulmonary/Chest: No stridor. No respiratory distress. She has wheezes (diffuse). She has no rhonchi. She has no rales.   Abdominal: Abdomen is soft. She exhibits no distension. There is no abdominal tenderness. There is no rebound and no guarding.   Musculoskeletal:         General: No edema.      Cervical back: Neck supple.     Neurological: She is alert and oriented to person, place, and time.   Skin: Skin is warm and dry.         ED Course   Critical Care    Date/Time: 3/23/2025 3:26 AM    Performed by: Eve Guzman MD  Authorized by: Eve Guzman MD  Direct patient critical care time: 20 minutes  Additional history critical care time: 3 minutes  Ordering / reviewing critical care time: 3 minutes  Documentation critical care time: 8  minutes  Consulting other physicians critical care time: 3 minutes  Total critical care time (exclusive of procedural time) : 37 minutes  Critical care was necessary to treat or prevent imminent or life-threatening deterioration of the following conditions: respiratory failure.        Labs Reviewed   COMPREHENSIVE METABOLIC PANEL - Abnormal       Result Value    Sodium 140      Potassium 3.8      Chloride 105      CO2 25      Glucose 129 (*)     BUN 15      Creatinine 1.3      Calcium 9.6      Protein Total 6.8      Albumin 4.0      Bilirubin Total 0.6      ALP 57      AST 20      ALT 11      Anion Gap 10      eGFR 44 (*)    CBC WITH DIFFERENTIAL - Abnormal    WBC 9.00      RBC 5.10      Hgb 15.2      Hct 46.6      MCV 91      MCH 29.8      MCHC 32.6      RDW 14.0      Platelet Count 71 (*)     MPV 12.1      Nucleated RBC 0      Neut % 47.2      Lymph % 33.8      Mono % 7.3      Eos % 10.3 (*)     Basophil % 0.7      Imm Grans % 0.7 (*)     Neut # 4.3      Lymph # 3.04      Mono # 0.66      Eos # 0.93 (*)     Baso # 0.06      Imm Grans # 0.06 (*)    B-TYPE NATRIURETIC PEPTIDE - Normal    BNP 30     TROPONIN I HIGH SENSITIVITY - Normal    Troponin High Sensitive 10.3     CBC W/ AUTO DIFFERENTIAL    Narrative:     The following orders were created for panel order CBC auto differential.  Procedure                               Abnormality         Status                     ---------                               -----------         ------                     CBC with Differential[[id number]]       Abnormal            Final result                 Please view results for these tests on the individual orders.          Imaging Results              CTA Chest Non-Coronary (PE Studies) (Final result)  Result time 03/23/25 01:16:35      Final result by Michelle Johnson MD (03/23/25 01:16:35)                   Impression:      1. No evidence of pulmonary artery thromboembolism through the segmental levels.  No acute  intrathoracic abnormality.  2. Postoperative change of prior TAVR.  Cardiac loop recorder present within the left extra thoracic soft tissues.  3. Right middle lobe 3 mm pulmonary nodule.  For a solid nodule <6 mm, Fleischner Society 2017 guidelines recommend no routine follow up for a low risk patient, or follow-up with non-contrast chest CT at 12 months in a high risk patient.      Electronically signed by: Michelle Johnson MD  Date:    03/23/2025  Time:    01:16               Narrative:    EXAMINATION:  CTA CHEST NON CORONARY (PE STUDIES)    CLINICAL HISTORY:  Pulmonary embolism (PE) suspected, high prob;hx of possible pe, no eliquis use;    TECHNIQUE:  Low dose axial images, sagittal and coronal reformations were obtained from the thoracic inlet to the lung bases following the IV administration of 100 mL of Omnipaque 350.  Contrast timing was optimized to evaluate the pulmonary arteries.  MIP images were performed.    COMPARISON:  Chest radiograph 03/22/2025, 02/07/2025    FINDINGS:  There is postoperative change of prior TAVR.  The thoracic aorta normal in caliber, contour and course with atherosclerosis.  The heart is not enlarged and there is no evidence of pericardial effusion.There is calcific atherosclerosis of the coronary vessels.  There are multiple normal sized mediastinal lymph nodes.  Hilar contours are within normal limits.  The esophagus maintains normal caliber, contour and course.  Cardiac loop recorder present within the left extra thoracic soft tissues.    The trachea is midline and the proximal airways are patent.  There is a 3 mm right middle lobe pulmonary nodule.  No pleural fluid, focal consolidation or pneumothorax.    Allowing for artifact from dense contrast bolus in the SVC there are no filling defects within the pulmonary arteries to suggest pulmonary artery thromboembolism through the segmental levels.    Visualized structures of the upper abdomen demonstrate atherosclerosis of the  abdominal aorta.  There is postoperative change of cholecystectomy.  The visualized osseous structures are intact with degenerative change.                                       X-Ray Chest AP (Final result)  Result time 03/22/25 22:54:33      Final result by Pavithra Gaytan MD (03/22/25 22:54:33)                   Impression:      No acute findings      Electronically signed by: Pavithra Gaytan  Date:    03/22/2025  Time:    22:54               Narrative:    EXAMINATION:  XR CHEST AP PORTABLE    CLINICAL HISTORY:  SOB;    TECHNIQUE:  Single frontal view of the chest was performed.    COMPARISON:  02/07/2025    FINDINGS:  Loop recorder.  Lungs are clear. No focal consolidation. No pleural effusion. No pneumothorax. Normal heart size.  Aortic valve replacement                                       Medications   albuterol-ipratropium 2.5 mg-0.5 mg/3 mL nebulizer solution 3 mL (9 mLs Nebulization Given 3/23/25 0012)   levoFLOXacin tablet 750 mg (has no administration in time range)   predniSONE tablet 60 mg (60 mg Oral Given 3/23/25 0014)   droPERidol injection 0.625 mg (0.625 mg Intravenous Given 3/23/25 0014)   iohexoL (OMNIPAQUE 350) injection 100 mL (100 mLs Intravenous Given 3/23/25 0050)     Medical Decision Making  Amount and/or Complexity of Data Reviewed  Radiology: ordered.    Risk  Prescription drug management.  Decision regarding hospitalization.    On my independent interpretation EKG with rate of 90, normal intervals except qtc 486, no sign of acute occlusion myocardial infarction      On my independent interpretation labs CBC, CMP, troponin, BNP within acceptable limits    Clinically most likely COPD exacerbation but as patient was recently diagnosed with a possible PE on V/Q scan and not taking Eliquis and now with hypoxia and worsening shortness of breath CT PE obtained which showed no PE and patient was updated.  Patient reported requiring significant amount of albuterol at home, after 3 duo  nebs 91% on room air when at rest and reporting breathing is much improved but not back to normal therefore will be admitted for COPD exacerbation.  Had long discussion with the patient regarding smoking cessation.  Patient reports allergy to penicillin therefore started on levofloxacin for inpatient COPD exacerbation and also given 60 mg prednisone.  To be admitted by Dr. Cano.       Eve Guzman MD  Emergency Medicine Staff Physician  3:22 AM                                     Clinical Impression:  Final diagnoses:  [R06.02] SOB (shortness of breath)          ED Disposition Condition    Admit Stable                    [1]   Social History  Tobacco Use    Smoking status: Every Day     Current packs/day: 1.00     Average packs/day: 1 pack/day for 55.2 years (55.2 ttl pk-yrs)     Types: Cigarettes     Start date: 1/1/1970    Smokeless tobacco: Never    Tobacco comments:     Pt has smoked now for 54 years and smokes 1pk/day. Down from 4pk/day in the past.  States she has quit since her sickness and it's been a week now. Plans to stay quit.   Substance Use Topics    Drug use: No        Eve Guzman MD  03/23/25 3656

## 2025-03-23 NOTE — PLAN OF CARE
[facility] - Emergency Dept  Initial Discharge Assessment       Primary Care Provider: Scar Hussein MD    Admission Diagnosis: SOB (shortness of breath) [R06.02]    Admission Date: 3/22/2025  Expected Discharge Date: TBD  SW met with patient at bedside to complete assessment. Patient answered most of the questions because patient is hard of hearing.Patient has no POA, living will or advance directive. Patient is not being followed by home health, is not on HD, and does not take Coumadin. Patient stated that she does not want to take her medications. Patient stated that she only takes a multivitamin and baby aspirin. Patient's spouse asked about sitter services for patient. Patient's spouse asked about getting sitters for patient. SW told patient that this service is not covered by insurance. Patient's  will bring her home when she is discharged. There are no other identified needs at this time.    Transition of Care Barriers: None    Payor: HU[employer] MANAGED MEDICARE / Plan: [employer] MEDICARE PPO / Product Type: Medicare Advantage /     Extended Emergency Contact Information  Primary Emergency Contact: Arpit Blake  Address: [address] [place]  [address]           [place] [address] [facility]  Home Phone: [phone]  Mobile Phone: [phone]  Relation: Spouse    Discharge Plan A: Home with family  Discharge Plan B: Home with family       [facility] [place], [place] - [address]  [address]  [address] [address]  Phone: [phone] Fax: [phone]    [id number] - [place], [place] - [address] [place] [facility] [address]  [address]  [place] [address]  Phone: [phone] Fax: [phone]      Initial Assessment (most recent)       Adult Discharge Assessment - 03/23/25 1358          Discharge Assessment    Assessment Type Discharge Planning Assessment     Confirmed/corrected address, phone number and  insurance Yes     Confirmed Demographics Correct on Facesheet     Source of Information patient;family     When was your last doctors appointment? --   2-3 weeks ago    Communicated CELESTE with patient/caregiver Date not available/Unable to determine     Reason For Admission COPD exacerbation     People in Home spouse     Facility Arrived From: home     Do you expect to return to your current living situation? Yes     Do you have help at home or someone to help you manage your care at home? Yes     Who are your caregiver(s) and their phone number(s)? Arpitburke Thibodeauxodin/spouse [phone]     Prior to hospitilization cognitive status: Alert/Oriented;No Deficits     Current cognitive status: Alert/Oriented;No Deficits     Walking or Climbing Stairs Difficulty no     Dressing/Bathing Difficulty no     Equipment Currently Used at Home nebulizer     Readmission within 30 days? No     Patient currently being followed by outpatient case management? No     Do you currently have service(s) that help you manage your care at home? No     Do you take prescription medications? --   only takes multivitamin and baby aspirin    Do you have prescription coverage? Yes     Coverage      Do you have any problems affording any of your prescribed medications? No     Is the patient taking medications as prescribed? no     If no, which medications is patient not taking? HTN meds, cholesterol meds     Who is going to help you get home at discharge? Arpit Blake/spouse [phone]     How do you get to doctors appointments? family or friend will provide     Are you on dialysis? No     Do you take coumadin? No     Discharge Plan A Home with family     Discharge Plan B Home with family     DME Needed Upon Discharge  none     Discharge Plan discussed with: Spouse/sig other;Patient     Name(s) and Number(s) Arpit Blake/spouse [phone]     Transition of Care Barriers None

## 2025-03-23 NOTE — PROGRESS NOTES
Patient arrived to unit. VSS.NAD. SN oriented patient to room. Patient with no questions currently. Safety precautions initiated. Will continue to monitor.

## 2025-03-23 NOTE — PLAN OF CARE
Assuming care of a 69 year old female with a past medical history of tobacco use, COPD, CVA, TAVR, and HLD who presented with an acute COPD exacerbation. She is stable on 2 L NC. She is on Duonebs, IV steroids and azithromycin.

## 2025-03-23 NOTE — SUBJECTIVE & OBJECTIVE
Past Medical History:   Diagnosis Date    A-fib     Blind in both eyes     Hyperlipidemia     Hypertension     Stroke     x 3    Tobacco use 11/02/2017    Unspecified macular degeneration     Unspecified macular degeneration        Past Surgical History:   Procedure Laterality Date    ANGIOGRAPHY OF LOWER EXTREMITY N/A 11/01/2023    Procedure: ANGIOGRAM BILATERLA LOWER;  Surgeon: Martin Hair MD;  Location: [facility] CATH/EP LAB;  Service: General;  Laterality: N/A;    AORTIC VALVE REPLACEMENT      AORTOGRAPHY WITH EXTREMITY RUNOFF N/A 11/01/2023    Procedure: AORTOGRAM, WITH EXTREMITY RUNOFF;  Surgeon: Martin Hair MD;  Location: [facility] CATH/EP LAB;  Service: General;  Laterality: N/A;    BLADDER SURGERY      BLADDER SURGERY      Lift.    CATARACT EXTRACTION      CHOLECYSTECTOMY      HYSTERECTOMY      INSERTION OF IMPLANTABLE LOOP RECORDER N/A 6/7/2024    Procedure: Insertion, Implantable Loop Recorder;  Surgeon: Todd Agee MD;  Location: [facility] CATH/EP LAB;  Service: Cardiology;  Laterality: N/A;    KNEE SURGERY Left     TONSILLECTOMY         Review of patient's allergies indicates:   Allergen Reactions    Pcn [penicillins] Other (See Comments)     Unable to obtain       No current facility-administered medications on file prior to encounter.     Current Outpatient Medications on File Prior to Encounter   Medication Sig    aspirin (ECOTRIN) 81 MG EC tablet Take 1 tablet (81 mg total) by mouth once daily.    atorvastatin (LIPITOR) 40 MG tablet Take 2 tablets (80 mg total) by mouth every evening.    budesonide-formoterol 160-4.5 mcg (SYMBICORT) 160-4.5 mcg/actuation HFAA Inhale 2 puffs into the lungs 2 (two) times daily.    CAPSICUM, CAYENNE, ORAL Take 1 capsule by mouth Daily.    cholecalciferol, vitamin D3, 1,250 mcg (50,000 unit) capsule Take 50,000 Units by mouth.    cinnamon bark (CINNAMON) 500 mg capsule Take 500 mg by mouth once daily.    coenzyme Q10 100 mg capsule Take 2 capsules (200 mg total) by  mouth once daily.    COMBIVENT RESPIMAT  mcg/actuation inhaler INHALE 2 PUFFS INTO THE LUNGS EVERY 6 HOURS AS NEEDED FOR WHEEZING OR SHORTNESS OF BREATH OR RESCUE    ginger root (GINGER EXTRACT ORAL) Take 1 capsule by mouth once daily.    nicotine (NICODERM CQ) 21 mg/24 hr Place 1 patch onto the skin once daily.    OXYGEN-AIR DELIVERY SYSTEMS MISC by [facility]Non-Drug; Combo Route) route. 3 L by nasal route    pyrilamine-chlophedianoL (NINJACOF) 12.5-12.5 mg/5 mL Liqd Take 5-10 mLs by mouth every 6 to 8 hours as needed (Cough).     Family History       Problem Relation (Age of Onset)    Cancer Father    Diabetes Mother    Glaucoma Mother, Sister    Kidney disease Mother    Macular degeneration Mother, Sister, Brother          Tobacco Use    Smoking status: Every Day     Current packs/day: 1.00     Average packs/day: 1 pack/day for 55.2 years (55.2 ttl pk-yrs)     Types: Cigarettes     Start date: 1/1/1970    Smokeless tobacco: Never    Tobacco comments:     Pt has smoked now for 54 years and smokes 1pk/day. Down from 4pk/day in the past.  States she has quit since her sickness and it's been a week now. Plans to stay quit.   Substance and Sexual Activity    Alcohol use: Not on file    Drug use: No    Sexual activity: Not on file     Review of Systems   All other systems reviewed and are negative.    Objective:     Vital Signs (Most Recent):  Temp: 97.9 °F (36.6 °C) (03/22/25 2229)  Pulse: 80 (03/23/25 0716)  Resp: (!) 21 (03/23/25 0716)  BP: (!) 143/71 (03/23/25 0700)  SpO2: 97 % (03/23/25 0716) Vital Signs (24h Range):  Temp:  [97.9 °F (36.6 °C)] 97.9 °F (36.6 °C)  Pulse:  [76-96] 80  Resp:  [18-24] 21  SpO2:  [89 %-98 %] 97 %  BP: (116-148)/(56-77) 143/71     Weight: 68 kg (150 lb)  Body mass index is 27.44 kg/m².     Physical Exam  Vitals and nursing note reviewed. Exam conducted with a chaperone present.   HENT:      Head: Normocephalic.      Nose: Nose normal.      Mouth/Throat:      Mouth: Mucous membranes  "are moist.   Eyes:      Extraocular Movements: Extraocular movements intact.      Pupils: Pupils are equal, round, and reactive to light.   Cardiovascular:      Rate and Rhythm: Normal rate and regular rhythm.      Pulses: Normal pulses.      Heart sounds: Normal heart sounds.   Pulmonary:      Effort: Pulmonary effort is normal.      Breath sounds: Wheezing present.   Abdominal:      General: Abdomen is flat. Bowel sounds are normal.      Palpations: Abdomen is soft.   Musculoskeletal:         General: Normal range of motion.      Cervical back: Normal range of motion.   Skin:     General: Skin is warm.      Capillary Refill: Capillary refill takes less than 2 seconds.   Neurological:      General: No focal deficit present.      Mental Status: She is alert and oriented to person, place, and time.   Psychiatric:         Mood and Affect: Mood normal.              CRANIAL NERVES     CN III, IV, VI   Pupils are equal, round, and reactive to light.       Significant Labs: All pertinent labs within the past 24 hours have been reviewed.  CBC:   Recent Labs   Lab 03/22/25 2242 03/23/25  0523   WBC 9.00 7.67   HGB 15.2 13.1   HCT 46.6 40.7   PLT 71* 145*     CMP:   Recent Labs   Lab 03/22/25 2242 03/23/25  0523    138   K 3.8 3.8   CO2 25 24   BUN 15 16   CREATININE 1.3 1.2   CALCIUM 9.6 9.1   ALBUMIN 4.0 3.6   BILITOT 0.6 0.4   ALKPHOS 57 51*   AST 20 14   ALT 11 9*     Cardiac Markers:   Recent Labs   Lab 03/22/25 2242   BNP 30     Coagulation: No results for input(s): "PT", "INR", "APTT" in the last 48 hours.  Lactic Acid: No results for input(s): "LACTATE" in the last 48 hours.  Lipase: No results for input(s): "LIPASE" in the last 48 hours.  Magnesium:   Recent Labs   Lab 03/23/25  0523   MG 2.0     Troponin: No results for input(s): "TROPONINI", "TROPONINIHS" in the last 48 hours.  TSH: No results for input(s): "TSH" in the last 4320 hours.  Urine Studies: No results for input(s): "COLORU", "APPEARANCEUA", " ""PHUR", "SPECGRAV", "PROTEINUA", "GLUCUA", "KETONESU", "BILIRUBINUA", "OCCULTUA", "NITRITE", "UROBILINOGEN", "LEUKOCYTESUR", "RBCUA", "WBCUA", "BACTERIA", "SQUAMEPITHEL", "HYALINECASTS" in the last 48 hours.    Invalid input(s): "WRIGHTSUR"    Significant Imaging: I have reviewed all pertinent imaging results/findings within the past 24 hours.  I have reviewed and interpreted all pertinent imaging results/findings within the past 24 hours.  "

## 2025-03-23 NOTE — HOSPITAL COURSE
Elissa Raymond is a 69 year old female with a past medical history of tobacco use, COPD, CVA, TAVR, and HLD who presented with an acute COPD exacerbation.  She was given Duonebs, IV steroids and azithromycin and significantly improved.  On exam, wheezing improved.  She improved to room air and was able to ambulate without any oxygen requirement.  She had leukocytosis from high-dose IV steroids. Her symptoms significantly improved and she was requesting for discharge.  PT/OT consulted who recommended home health and case management arranged home health nurse.  She will continue her home inhalers and she will also complete a course of prednisone taper.  She will follow up with her PCP and pulmonologist as outpatient with repeat labs.  All instructions provided and patient verbalized understanding.  Strict return precautions advised.

## 2025-03-24 LAB
ABSOLUTE EOSINOPHIL (SMH): 0 K/UL
ABSOLUTE MONOCYTE (SMH): 0.63 K/UL (ref 0.3–1)
ABSOLUTE NEUTROPHIL COUNT (SMH): 19.5 K/UL (ref 1.8–7.7)
ANION GAP (SMH): 10 MMOL/L (ref 8–16)
BASOPHILS # BLD AUTO: 0.03 K/UL
BASOPHILS NFR BLD AUTO: 0.1 %
BUN SERPL-MCNC: 25 MG/DL (ref 8–23)
CALCIUM SERPL-MCNC: 9.3 MG/DL (ref 8.7–10.5)
CHLORIDE SERPL-SCNC: 107 MMOL/L (ref 95–110)
CO2 SERPL-SCNC: 22 MMOL/L (ref 23–29)
CREAT SERPL-MCNC: 1.3 MG/DL (ref 0.5–1.4)
ERYTHROCYTE [DISTWIDTH] IN BLOOD BY AUTOMATED COUNT: 13.9 % (ref 11.5–14.5)
GFR SERPLBLD CREATININE-BSD FMLA CKD-EPI: 44 ML/MIN/1.73/M2
GLUCOSE SERPL-MCNC: 204 MG/DL (ref 70–110)
HCT VFR BLD AUTO: 40.4 % (ref 37–48.5)
HGB BLD-MCNC: 12.7 GM/DL (ref 12–16)
IMM GRANULOCYTES # BLD AUTO: 0.19 K/UL (ref 0–0.04)
IMM GRANULOCYTES NFR BLD AUTO: 0.9 % (ref 0–0.5)
LYMPHOCYTES # BLD AUTO: 1.24 K/UL (ref 1–4.8)
MAGNESIUM SERPL-MCNC: 2.1 MG/DL (ref 1.6–2.6)
MCH RBC QN AUTO: 30 PG (ref 27–31)
MCHC RBC AUTO-ENTMCNC: 31.4 G/DL (ref 32–36)
MCV RBC AUTO: 96 FL (ref 82–98)
NUCLEATED RBC (/100WBC) (SMH): 0 /100 WBC
PLATELET # BLD AUTO: 164 K/UL (ref 150–450)
PMV BLD AUTO: 12 FL (ref 9.2–12.9)
POTASSIUM SERPL-SCNC: 3.8 MMOL/L (ref 3.5–5.1)
RBC # BLD AUTO: 4.23 M/UL (ref 4–5.4)
RELATIVE EOSINOPHIL (SMH): 0 % (ref 0–8)
RELATIVE LYMPHOCYTE (SMH): 5.8 % (ref 18–48)
RELATIVE MONOCYTE (SMH): 2.9 % (ref 4–15)
RELATIVE NEUTROPHIL (SMH): 90.3 % (ref 38–73)
SODIUM SERPL-SCNC: 139 MMOL/L (ref 136–145)
WBC # BLD AUTO: 21.54 K/UL (ref 3.9–12.7)

## 2025-03-24 PROCEDURE — 94640 AIRWAY INHALATION TREATMENT: CPT

## 2025-03-24 PROCEDURE — 82374 ASSAY BLOOD CARBON DIOXIDE: CPT | Performed by: STUDENT IN AN ORGANIZED HEALTH CARE EDUCATION/TRAINING PROGRAM

## 2025-03-24 PROCEDURE — 63600175 PHARM REV CODE 636 W HCPCS: Performed by: STUDENT IN AN ORGANIZED HEALTH CARE EDUCATION/TRAINING PROGRAM

## 2025-03-24 PROCEDURE — 25000003 PHARM REV CODE 250: Performed by: STUDENT IN AN ORGANIZED HEALTH CARE EDUCATION/TRAINING PROGRAM

## 2025-03-24 PROCEDURE — 36415 COLL VENOUS BLD VENIPUNCTURE: CPT | Performed by: STUDENT IN AN ORGANIZED HEALTH CARE EDUCATION/TRAINING PROGRAM

## 2025-03-24 PROCEDURE — 27000221 HC OXYGEN, UP TO 24 HOURS

## 2025-03-24 PROCEDURE — 12000002 HC ACUTE/MED SURGE SEMI-PRIVATE ROOM

## 2025-03-24 PROCEDURE — 25000242 PHARM REV CODE 250 ALT 637 W/ HCPCS: Performed by: STUDENT IN AN ORGANIZED HEALTH CARE EDUCATION/TRAINING PROGRAM

## 2025-03-24 PROCEDURE — 85025 COMPLETE CBC W/AUTO DIFF WBC: CPT | Performed by: INTERNAL MEDICINE

## 2025-03-24 PROCEDURE — 83735 ASSAY OF MAGNESIUM: CPT | Performed by: INTERNAL MEDICINE

## 2025-03-24 PROCEDURE — 94761 N-INVAS EAR/PLS OXIMETRY MLT: CPT

## 2025-03-24 PROCEDURE — 25000003 PHARM REV CODE 250: Performed by: INTERNAL MEDICINE

## 2025-03-24 PROCEDURE — 63600175 PHARM REV CODE 636 W HCPCS: Mod: JZ,TB | Performed by: INTERNAL MEDICINE

## 2025-03-24 PROCEDURE — 25000242 PHARM REV CODE 250 ALT 637 W/ HCPCS: Performed by: INTERNAL MEDICINE

## 2025-03-24 RX ORDER — AZITHROMYCIN 250 MG/1
500 TABLET, FILM COATED ORAL DAILY
Status: DISCONTINUED | OUTPATIENT
Start: 2025-03-25 | End: 2025-03-24

## 2025-03-24 RX ORDER — FAMOTIDINE 20 MG/1
20 TABLET, FILM COATED ORAL DAILY
Status: DISCONTINUED | OUTPATIENT
Start: 2025-03-25 | End: 2025-03-25 | Stop reason: HOSPADM

## 2025-03-24 RX ADMIN — SENNOSIDES, DOCUSATE SODIUM 1 TABLET: 50; 8.6 TABLET, FILM COATED ORAL at 08:03

## 2025-03-24 RX ADMIN — SENNOSIDES, DOCUSATE SODIUM 1 TABLET: 50; 8.6 TABLET, FILM COATED ORAL at 09:03

## 2025-03-24 RX ADMIN — AZITHROMYCIN MONOHYDRATE 500 MG: 500 INJECTION, POWDER, LYOPHILIZED, FOR SOLUTION INTRAVENOUS at 05:03

## 2025-03-24 RX ADMIN — METHYLPREDNISOLONE SODIUM SUCCINATE 80 MG: 40 INJECTION, POWDER, FOR SOLUTION INTRAMUSCULAR; INTRAVENOUS at 09:03

## 2025-03-24 RX ADMIN — ENOXAPARIN SODIUM 40 MG: 40 INJECTION SUBCUTANEOUS at 05:03

## 2025-03-24 RX ADMIN — GUAIFENESIN 400 MG: 200 SOLUTION ORAL at 08:03

## 2025-03-24 RX ADMIN — BUDESONIDE INHALATION 0.5 MG: 0.5 SUSPENSION RESPIRATORY (INHALATION) at 07:03

## 2025-03-24 RX ADMIN — IPRATROPIUM BROMIDE AND ALBUTEROL SULFATE 3 ML: .5; 3 SOLUTION RESPIRATORY (INHALATION) at 07:03

## 2025-03-24 RX ADMIN — METHYLPREDNISOLONE SODIUM SUCCINATE 80 MG: 40 INJECTION, POWDER, FOR SOLUTION INTRAMUSCULAR; INTRAVENOUS at 01:03

## 2025-03-24 RX ADMIN — GUAIFENESIN 400 MG: 200 SOLUTION ORAL at 01:03

## 2025-03-24 RX ADMIN — METHYLPREDNISOLONE SODIUM SUCCINATE 80 MG: 40 INJECTION, POWDER, FOR SOLUTION INTRAMUSCULAR; INTRAVENOUS at 05:03

## 2025-03-24 RX ADMIN — FAMOTIDINE 20 MG: 10 INJECTION, SOLUTION INTRAVENOUS at 09:03

## 2025-03-24 RX ADMIN — ARFORMOTEROL TARTRATE 15 MCG: 15 SOLUTION RESPIRATORY (INHALATION) at 07:03

## 2025-03-24 RX ADMIN — ASPIRIN 81 MG: 81 TABLET, COATED ORAL at 09:03

## 2025-03-24 RX ADMIN — GUAIFENESIN 400 MG: 200 SOLUTION ORAL at 09:03

## 2025-03-24 RX ADMIN — ATORVASTATIN CALCIUM 80 MG: 40 TABLET, FILM COATED ORAL at 08:03

## 2025-03-24 RX ADMIN — GUAIFENESIN 400 MG: 200 SOLUTION ORAL at 05:03

## 2025-03-24 RX ADMIN — IPRATROPIUM BROMIDE AND ALBUTEROL SULFATE 3 ML: .5; 3 SOLUTION RESPIRATORY (INHALATION) at 01:03

## 2025-03-24 NOTE — PROGRESS NOTES
[facility] Medicine  Progress Note    Patient Name: Elissa Raymond  MRN: [id number]  Patient Class: IP- Inpatient   Admission Date: 3/22/2025  Length of Stay: 1 days  Attending Physician: Ines Nguyen, *  Primary Care Provider: Scar Hussein MD        Subjective     Principal Problem:COPD exacerbation        HPI:  69-year-old presenting with shortness of breath. a past medical history of a TAVR in 2022, A-fib with an loop recorder placed in June 2024, CVA x 2 with no residual deficits and COPD (denies home O2 use).  About 1 month ago she was admitted to an outside hospital for COPD exacerbation.  She reports she feels that she is having the same today.  She continues to smoke although she says she has decreased the amount that she smokes significantly but when stressed reaches for a cigarette. Says she has to keep her inhaler close by as she is using it almost constatntly the last few days. Has long coughing spells feels that she may pass out. Was mid-80's on o2 sat on room air on arrival. Was told based on vq scan she may have pe. Does not want to take eliquis as she is scared she will  fall and bleed out. Hx also from her partner at bedside     Still smokes some   Non drinker    Lives at home with     She says she was better after she left last hospital 2/25/2025    Treated for COPD exacerbation   Sent home with steroid taper and abx    Got IV Doxy in hospital .  And then given zithromax note says         She said she did take all her meds at home    Said she was getting very dizzy once she got home .  With movement . Bending over or standing up .        No SOB     But then started having bad coughing spells     And more wheezing again     So she came to our ER         Impression: CTA chest      1. No evidence of pulmonary artery thromboembolism through the segmental levels.  No acute intrathoracic abnormality.  2. Postoperative change of prior TAVR.  Cardiac loop  recorder present within the left extra thoracic soft tissues.  3. Right middle lobe 3 mm pulmonary nodule.  For a solid nodule <6 mm, Fleischner Society 2017 guidelines recommend no routine follow up for a low risk patient, or follow-up with non-contrast chest CT at 12 months in a high risk patient.          IN OUR ER     Labs were normal     CTA results above   No PE   No PNA    She was Hypoxemic when she came sats in 80s     But since treated in ER was able to go to room air at rest       Got prednisone in ER  And Nebs       Plan is to admit for copd exacerbation     Overview/Hospital Course:  Elissa Raymond is a 69 year old female with a past medical history of tobacco use, COPD, CVA, TAVR, and HLD who presented with an acute COPD exacerbation. She is stable on 2 L NC. She is on Duonebs, IV steroids and azithromycin.    Interval History:  Patient is seen and examined.  Still significantly wheezing.  Improved to room air.  Continue IV steroids for 1 more day and transition to p.o. tomorrow.    Review of Systems  Objective:     Vital Signs (Most Recent):  Temp: 97.4 °F (36.3 °C) (03/24/25 1551)  Pulse: 81 (03/24/25 1551)  Resp: 18 (03/24/25 1551)  BP: (!) 153/65 (03/24/25 1551)  SpO2: (!) 93 % (03/24/25 1551) Vital Signs (24h Range):  Temp:  [97.4 °F (36.3 °C)-98.3 °F (36.8 °C)] 97.4 °F (36.3 °C)  Pulse:  [72-98] 81  Resp:  [18-20] 18  SpO2:  [93 %-99 %] 93 %  BP: (101-153)/(55-74) 153/65     Weight: 66.5 kg (146 lb 9.7 oz)  Body mass index is 26.81 kg/m².    Intake/Output Summary (Last 24 hours) at 3/24/2025 1648  Last data filed at 3/24/2025 1334  Gross per 24 hour   Intake 480 ml   Output 1 ml   Net 479 ml         Physical Exam  Vitals and nursing note reviewed. Exam conducted with a chaperone present.   HENT:      Head: Normocephalic.      Nose: Nose normal.      Mouth/Throat:      Mouth: Mucous membranes are moist.   Eyes:      Extraocular Movements: Extraocular movements intact.      Pupils: Pupils are equal,  round, and reactive to light.   Cardiovascular:      Rate and Rhythm: Normal rate and regular rhythm.      Pulses: Normal pulses.      Heart sounds: Normal heart sounds.   Pulmonary:      Effort: Pulmonary effort is normal.      Breath sounds: Wheezing present.   Abdominal:      General: Abdomen is flat. Bowel sounds are normal.      Palpations: Abdomen is soft.   Musculoskeletal:         General: Normal range of motion.      Cervical back: Normal range of motion.   Skin:     General: Skin is warm.      Capillary Refill: Capillary refill takes less than 2 seconds.   Neurological:      General: No focal deficit present.      Mental Status: She is alert and oriented to person, place, and time.   Psychiatric:         Mood and Affect: Mood normal.               Significant Labs: All pertinent labs within the past 24 hours have been reviewed.    Significant Imaging: I have reviewed all pertinent imaging results/findings within the past 24 hours.      Assessment & Plan  COPD exacerbation  -continue IV steroids, for 1 more day   -nebulization   -inhalers  Tobacco dependence  Consult for greater than 10 minutes, patient refused nicotine patch  SOB (shortness of breath)    Copd exacerbation   Acute hypoxemic respiratory failure  Resolved  VTE Risk Mitigation (From admission, onward)           Ordered     enoxaparin injection 40 mg  Every 24 hours        Note to Pharmacy: 39.5 CRCL  boarderline- continue 40 mg lovenox for now    03/23/25 0722     Place sequential compression device  Until discontinued         03/23/25 0358     IP VTE HIGH RISK PATIENT  Once         03/23/25 0358     Place sequential compression device  Until discontinued         03/23/25 0358                    Discharge Planning   CELESTE: 3/25/2025     Code Status: Full Code   Medical Readiness for Discharge Date:   Discharge Plan A: Home with family                        Ines Nguyen MD  Department of Hospital Medicine   [facility]  [facility]

## 2025-03-24 NOTE — PROGRESS NOTES
Pharmacist Intervention IV to PO Note    Elissa Raymond is a 69 y.o. female being treated with IV medication azithromycin  famotidine    Patient Data:    Vital Signs (Most Recent):  Temp: 97.4 °F (36.3 °C) (03/24/25 1551)  Pulse: 81 (03/24/25 1551)  Resp: 18 (03/24/25 1551)  BP: (!) 153/65 (03/24/25 1551)  SpO2: (!) 93 % (03/24/25 1551) Vital Signs (72h Range):  Temp:  [97.4 °F (36.3 °C)-98.3 °F (36.8 °C)]   Pulse:  [72-98]   Resp:  [17-24]   BP: (100-153)/(51-77)   SpO2:  [89 %-100 %]      CBC:  Recent Labs   Lab 03/22/25 2242 03/23/25  0523 03/24/25  0633   WBC 9.00 7.67 21.54*   RBC 5.10 4.29 4.23   HGB 15.2 13.1 12.7   HCT 46.6 40.7 40.4   PLT 71* 145* 164   MCV 91 95 96   MCH 29.8 30.5 30.0   MCHC 32.6 32.2 31.4*     CMP:     Recent Labs   Lab 03/22/25 2242 03/23/25  0523 03/24/25  0633   CALCIUM 9.6 9.1 9.3   ALBUMIN 4.0 3.6  --     138 139   K 3.8 3.8 3.8   CO2 25 24 22*   BUN 15 16 25*   CREATININE 1.3 1.2 1.3   ALKPHOS 57 51*  --    ALT 11 9*  --    AST 20 14  --    BILITOT 0.6 0.4  --        Dietary Orders:  Diet Orders            Diet Adult Regular Standard Tray: Regular starting at 03/23 0357            Based on the following criteria, this patient qualifies for intravenous to oral conversion:  [x] The patients gastrointestinal tract is functioning (tolerating medications via oral or enteral route for 24 hours and tolerating food or enteral feeds for 24 hours).  [x] The patient is hemodynamically stable for 24 hours (heart rate <100 beats per minute, systolic blood pressure >99 mm Hg, and respiratory rate <20 breaths per minute).  [x] The patient shows clinical improvement (afebrile for at least 24 hours and white blood cell count downtrending or normalized). Additionally, the patient must be non-neutropenic (absolute neutrophil count >500 cells/mm3).  [x] For antimicrobials, the patient has received IV therapy for at least 24 hours.      IV medication Famotidine 20 mg daily will be changed to  oral medication Famotidine 20 mg daily    Pharmacist's Name: Kalyan Magaña  Pharmacist's Extension: [phone]

## 2025-03-24 NOTE — CARE UPDATE
03/24/25 0716   Patient Assessment/Suction   Level of Consciousness (AVPU) alert   All Lung Fields Breath Sounds diminished   PRE-TX-O2   Device (Oxygen Therapy) nasal cannula   $ Is the patient on Low Flow Oxygen? Yes   Flow (L/min) (Oxygen Therapy) 2   SpO2 98 %   Pulse Oximetry Type Intermittent   $ Pulse Oximetry - Multiple Charge Pulse Oximetry - Multiple   Pulse 83   Resp 18   Aerosol Therapy   $ Aerosol Therapy Charges Aerosol Treatment   Respiratory Treatment Status (SVN) given   Treatment Route (SVN) mask   Patient Position HOB elevated   Signs of Intolerance (SVN) none   Breath Sounds Post-Respiratory Treatment   Throughout All Fields Post-Treatment All Fields   Throughout All Fields Post-Treatment no change   Post-treatment Heart Rate (beats/min) 88   Post-treatment Resp Rate (breaths/min) 20

## 2025-03-24 NOTE — PLAN OF CARE
Problem: COPD (Chronic Obstructive Pulmonary Disease)  Goal: Optimal Chronic Illness Coping  Outcome: Progressing  Goal: Optimal Level of Functional [place]  Outcome: Progressing  Goal: Absence of Infection Signs and Symptoms  Outcome: Progressing  Goal: Improved Oral Intake  Outcome: Progressing  Goal: Effective Oxygenation and Ventilation  Outcome: Progressing     Problem: Adult Inpatient Plan of Care  Goal: Plan of Care Review  Outcome: Progressing  Goal: Patient-Specific Goal (Individualized)  Outcome: Progressing  Goal: Absence of Hospital-Acquired Illness or Injury  Outcome: Progressing  Goal: Optimal Comfort and Wellbeing  Outcome: Progressing  Goal: Readiness for Transition of Care  Outcome: Progressing

## 2025-03-24 NOTE — CARE UPDATE
03/23/25 1931   Patient Assessment/Suction   Level of Consciousness (AVPU) alert   Respiratory Effort Unlabored   Expansion/Accessory Muscles/Retractions expansion symmetric;no retractions   All Lung Fields Breath Sounds Anterior:;Lateral:;diminished   Rhythm/Pattern, Respiratory no shortness of breath reported   PRE-TX-O2   Device (Oxygen Therapy) nasal cannula   Flow (L/min) (Oxygen Therapy) 2   SpO2 96 %   Pulse Oximetry Type Intermittent   $ Pulse Oximetry - Multiple Charge Pulse Oximetry - Multiple   Pulse 83   Resp 18   Aerosol Therapy   $ Aerosol Therapy Charges Aerosol Treatment   Daily Review of Necessity (SVN) completed   Respiratory Treatment Status (SVN) given   Treatment Route (SVN) mask   Patient Position HOB elevated   Post Treatment Assessment (SVN) breath sounds unchanged   Signs of Intolerance (SVN) none   Breath Sounds Post-Respiratory Treatment   Throughout All Fields Post-Treatment All Fields   Throughout All Fields Post-Treatment no change   Post-treatment Heart Rate (beats/min) 84   Post-treatment Resp Rate (breaths/min) 20   Education   $ Education Bronchodilator;Oxygen;15 min

## 2025-03-24 NOTE — SUBJECTIVE & OBJECTIVE
Interval History:  Patient is seen and examined.  Still significantly wheezing.  Improved to room air.  Continue IV steroids for 1 more day and transition to p.o. tomorrow.    Review of Systems  Objective:     Vital Signs (Most Recent):  Temp: 97.4 °F (36.3 °C) (03/24/25 1551)  Pulse: 81 (03/24/25 1551)  Resp: 18 (03/24/25 1551)  BP: (!) 153/65 (03/24/25 1551)  SpO2: (!) 93 % (03/24/25 1551) Vital Signs (24h Range):  Temp:  [97.4 °F (36.3 °C)-98.3 °F (36.8 °C)] 97.4 °F (36.3 °C)  Pulse:  [72-98] 81  Resp:  [18-20] 18  SpO2:  [93 %-99 %] 93 %  BP: (101-153)/(55-74) 153/65     Weight: 66.5 kg (146 lb 9.7 oz)  Body mass index is 26.81 kg/m².    Intake/Output Summary (Last 24 hours) at 3/24/2025 1648  Last data filed at 3/24/2025 1334  Gross per 24 hour   Intake 480 ml   Output 1 ml   Net 479 ml         Physical Exam  Vitals and nursing note reviewed. Exam conducted with a chaperone present.   HENT:      Head: Normocephalic.      Nose: Nose normal.      Mouth/Throat:      Mouth: Mucous membranes are moist.   Eyes:      Extraocular Movements: Extraocular movements intact.      Pupils: Pupils are equal, round, and reactive to light.   Cardiovascular:      Rate and Rhythm: Normal rate and regular rhythm.      Pulses: Normal pulses.      Heart sounds: Normal heart sounds.   Pulmonary:      Effort: Pulmonary effort is normal.      Breath sounds: Wheezing present.   Abdominal:      General: Abdomen is flat. Bowel sounds are normal.      Palpations: Abdomen is soft.   Musculoskeletal:         General: Normal range of motion.      Cervical back: Normal range of motion.   Skin:     General: Skin is warm.      Capillary Refill: Capillary refill takes less than 2 seconds.   Neurological:      General: No focal deficit present.      Mental Status: She is alert and oriented to person, place, and time.   Psychiatric:         Mood and Affect: Mood normal.               Significant Labs: All pertinent labs within the past 24 hours have  been reviewed.    Significant Imaging: I have reviewed all pertinent imaging results/findings within the past 24 hours.

## 2025-03-25 ENCOUNTER — PATIENT MESSAGE (OUTPATIENT)
Facility: CLINIC | Age: 70
End: 2025-03-25
Payer: MEDICARE

## 2025-03-25 ENCOUNTER — CLINICAL SUPPORT (OUTPATIENT)
Dept: SMOKING CESSATION | Facility: CLINIC | Age: 70
End: 2025-03-25

## 2025-03-25 VITALS
OXYGEN SATURATION: 94 % | TEMPERATURE: 98 F | HEART RATE: 79 BPM | RESPIRATION RATE: 18 BRPM | SYSTOLIC BLOOD PRESSURE: 152 MMHG | DIASTOLIC BLOOD PRESSURE: 73 MMHG | WEIGHT: 146.63 LBS | BODY MASS INDEX: 26.98 KG/M2 | HEIGHT: 62 IN

## 2025-03-25 DIAGNOSIS — F17.200 NICOTINE DEPENDENCE: Primary | ICD-10-CM

## 2025-03-25 LAB
ABSOLUTE EOSINOPHIL (SMH): 0 K/UL
ABSOLUTE MONOCYTE (SMH): 1.06 K/UL (ref 0.3–1)
ABSOLUTE NEUTROPHIL COUNT (SMH): 18.7 K/UL (ref 1.8–7.7)
ANION GAP (SMH): 6 MMOL/L (ref 8–16)
BASOPHILS # BLD AUTO: 0.02 K/UL
BASOPHILS NFR BLD AUTO: 0.1 %
BUN SERPL-MCNC: 31 MG/DL (ref 8–23)
CALCIUM SERPL-MCNC: 9.1 MG/DL (ref 8.7–10.5)
CHLORIDE SERPL-SCNC: 109 MMOL/L (ref 95–110)
CO2 SERPL-SCNC: 23 MMOL/L (ref 23–29)
CREAT SERPL-MCNC: 1.2 MG/DL (ref 0.5–1.4)
ERYTHROCYTE [DISTWIDTH] IN BLOOD BY AUTOMATED COUNT: 14.2 % (ref 11.5–14.5)
ERYTHROCYTE [DISTWIDTH] IN BLOOD BY AUTOMATED COUNT: 14.2 % (ref 11.5–14.5)
GFR SERPLBLD CREATININE-BSD FMLA CKD-EPI: 49 ML/MIN/1.73/M2
GLUCOSE SERPL-MCNC: 215 MG/DL (ref 70–110)
HCT VFR BLD AUTO: 37.7 % (ref 37–48.5)
HCT VFR BLD AUTO: 37.8 % (ref 37–48.5)
HGB BLD-MCNC: 12.4 GM/DL (ref 12–16)
HGB BLD-MCNC: 12.8 GM/DL (ref 12–16)
IMM GRANULOCYTES # BLD AUTO: 0.37 K/UL (ref 0–0.04)
IMM GRANULOCYTES NFR BLD AUTO: 1.8 % (ref 0–0.5)
INFLUENZA A MOLECULAR (OHS): NEGATIVE
INFLUENZA B MOLECULAR (OHS): NEGATIVE
LYMPHOCYTES # BLD AUTO: 0.84 K/UL (ref 1–4.8)
MAGNESIUM SERPL-MCNC: 2.1 MG/DL (ref 1.6–2.6)
MCH RBC QN AUTO: 30.3 PG (ref 27–31)
MCH RBC QN AUTO: 30.8 PG (ref 27–31)
MCHC RBC AUTO-ENTMCNC: 32.9 G/DL (ref 32–36)
MCHC RBC AUTO-ENTMCNC: 33.9 G/DL (ref 32–36)
MCV RBC AUTO: 91 FL (ref 82–98)
MCV RBC AUTO: 92 FL (ref 82–98)
NUCLEATED RBC (/100WBC) (SMH): 0 /100 WBC
PLATELET # BLD AUTO: 172 K/UL (ref 150–450)
PLATELET # BLD AUTO: 175 K/UL (ref 150–450)
PMV BLD AUTO: 11.3 FL (ref 9.2–12.9)
PMV BLD AUTO: 11.5 FL (ref 9.2–12.9)
POCT GLUCOSE: 199 MG/DL (ref 70–110)
POTASSIUM SERPL-SCNC: 3.6 MMOL/L (ref 3.5–5.1)
RBC # BLD AUTO: 4.09 M/UL (ref 4–5.4)
RBC # BLD AUTO: 4.15 M/UL (ref 4–5.4)
RELATIVE EOSINOPHIL (SMH): 0 % (ref 0–8)
RELATIVE LYMPHOCYTE (SMH): 4 % (ref 18–48)
RELATIVE MONOCYTE (SMH): 5.1 % (ref 4–15)
RELATIVE NEUTROPHIL (SMH): 89 % (ref 38–73)
SARS-COV-2 RDRP RESP QL NAA+PROBE: NEGATIVE
SODIUM SERPL-SCNC: 138 MMOL/L (ref 136–145)
WBC # BLD AUTO: 20.97 K/UL (ref 3.9–12.7)
WBC # BLD AUTO: 21.08 K/UL (ref 3.9–12.7)

## 2025-03-25 PROCEDURE — 83735 ASSAY OF MAGNESIUM: CPT | Performed by: INTERNAL MEDICINE

## 2025-03-25 PROCEDURE — 63600175 PHARM REV CODE 636 W HCPCS: Mod: JZ,TB | Performed by: INTERNAL MEDICINE

## 2025-03-25 PROCEDURE — 85027 COMPLETE CBC AUTOMATED: CPT

## 2025-03-25 PROCEDURE — 36415 COLL VENOUS BLD VENIPUNCTURE: CPT

## 2025-03-25 PROCEDURE — 63600175 PHARM REV CODE 636 W HCPCS

## 2025-03-25 PROCEDURE — 25000003 PHARM REV CODE 250: Performed by: INTERNAL MEDICINE

## 2025-03-25 PROCEDURE — 99406 BEHAV CHNG SMOKING 3-10 MIN: CPT | Performed by: CLINIC/CENTER

## 2025-03-25 PROCEDURE — 25000003 PHARM REV CODE 250

## 2025-03-25 PROCEDURE — 94618 PULMONARY STRESS TESTING: CPT

## 2025-03-25 PROCEDURE — 87040 BLOOD CULTURE FOR BACTERIA: CPT

## 2025-03-25 PROCEDURE — 85025 COMPLETE CBC W/AUTO DIFF WBC: CPT | Performed by: INTERNAL MEDICINE

## 2025-03-25 PROCEDURE — 80048 BASIC METABOLIC PNL TOTAL CA: CPT | Performed by: STUDENT IN AN ORGANIZED HEALTH CARE EDUCATION/TRAINING PROGRAM

## 2025-03-25 PROCEDURE — 97161 PT EVAL LOW COMPLEX 20 MIN: CPT

## 2025-03-25 PROCEDURE — 97116 GAIT TRAINING THERAPY: CPT

## 2025-03-25 PROCEDURE — 25000242 PHARM REV CODE 250 ALT 637 W/ HCPCS: Performed by: INTERNAL MEDICINE

## 2025-03-25 PROCEDURE — 36415 COLL VENOUS BLD VENIPUNCTURE: CPT | Performed by: INTERNAL MEDICINE

## 2025-03-25 PROCEDURE — 94761 N-INVAS EAR/PLS OXIMETRY MLT: CPT

## 2025-03-25 PROCEDURE — 99900035 HC TECH TIME PER 15 MIN (STAT)

## 2025-03-25 PROCEDURE — 94640 AIRWAY INHALATION TREATMENT: CPT

## 2025-03-25 PROCEDURE — 87502 INFLUENZA DNA AMP PROBE: CPT

## 2025-03-25 PROCEDURE — U0002 COVID-19 LAB TEST NON-CDC: HCPCS

## 2025-03-25 PROCEDURE — 25000242 PHARM REV CODE 250 ALT 637 W/ HCPCS: Performed by: STUDENT IN AN ORGANIZED HEALTH CARE EDUCATION/TRAINING PROGRAM

## 2025-03-25 PROCEDURE — 25000003 PHARM REV CODE 250: Performed by: STUDENT IN AN ORGANIZED HEALTH CARE EDUCATION/TRAINING PROGRAM

## 2025-03-25 RX ORDER — PREDNISONE 20 MG/1
40 TABLET ORAL DAILY
Qty: 12 TABLET | Refills: 0 | Status: ON HOLD | OUTPATIENT
Start: 2025-03-26 | End: 2025-03-30 | Stop reason: HOSPADM

## 2025-03-25 RX ORDER — GLUCAGON 1 MG
1 KIT INJECTION
Status: DISCONTINUED | OUTPATIENT
Start: 2025-03-25 | End: 2025-03-25 | Stop reason: HOSPADM

## 2025-03-25 RX ORDER — INSULIN ASPART 100 [IU]/ML
0-5 INJECTION, SOLUTION INTRAVENOUS; SUBCUTANEOUS
Status: DISCONTINUED | OUTPATIENT
Start: 2025-03-25 | End: 2025-03-25 | Stop reason: HOSPADM

## 2025-03-25 RX ORDER — PREDNISONE 20 MG/1
40 TABLET ORAL DAILY
Status: DISCONTINUED | OUTPATIENT
Start: 2025-03-26 | End: 2025-03-25 | Stop reason: HOSPADM

## 2025-03-25 RX ORDER — AZITHROMYCIN 250 MG/1
TABLET, FILM COATED ORAL
Qty: 6 TABLET | Refills: 0 | Status: ON HOLD | OUTPATIENT
Start: 2025-03-25 | End: 2025-03-30 | Stop reason: HOSPADM

## 2025-03-25 RX ORDER — IBUPROFEN 200 MG
24 TABLET ORAL
Status: DISCONTINUED | OUTPATIENT
Start: 2025-03-25 | End: 2025-03-25 | Stop reason: HOSPADM

## 2025-03-25 RX ORDER — IBUPROFEN 200 MG
16 TABLET ORAL
Status: DISCONTINUED | OUTPATIENT
Start: 2025-03-25 | End: 2025-03-25 | Stop reason: HOSPADM

## 2025-03-25 RX ORDER — GUAIFENESIN 100 MG/5ML
400 LIQUID ORAL 4 TIMES DAILY
Qty: 2 ML | Refills: 0 | Status: SHIPPED | OUTPATIENT
Start: 2025-03-25 | End: 2025-04-04

## 2025-03-25 RX ORDER — FAMOTIDINE 20 MG/1
20 TABLET, FILM COATED ORAL DAILY
Qty: 30 TABLET | Refills: 11 | Status: SHIPPED | OUTPATIENT
Start: 2025-03-26 | End: 2026-03-26

## 2025-03-25 RX ADMIN — IPRATROPIUM BROMIDE AND ALBUTEROL SULFATE 3 ML: .5; 3 SOLUTION RESPIRATORY (INHALATION) at 01:03

## 2025-03-25 RX ADMIN — ARFORMOTEROL TARTRATE 15 MCG: 15 SOLUTION RESPIRATORY (INHALATION) at 06:03

## 2025-03-25 RX ADMIN — BUDESONIDE INHALATION 0.5 MG: 0.5 SUSPENSION RESPIRATORY (INHALATION) at 06:03

## 2025-03-25 RX ADMIN — FAMOTIDINE 20 MG: 20 TABLET, FILM COATED ORAL at 08:03

## 2025-03-25 RX ADMIN — SENNOSIDES, DOCUSATE SODIUM 1 TABLET: 50; 8.6 TABLET, FILM COATED ORAL at 08:03

## 2025-03-25 RX ADMIN — AZITHROMYCIN MONOHYDRATE 500 MG: 500 INJECTION, POWDER, LYOPHILIZED, FOR SOLUTION INTRAVENOUS at 05:03

## 2025-03-25 RX ADMIN — METHYLPREDNISOLONE SODIUM SUCCINATE 80 MG: 40 INJECTION, POWDER, FOR SOLUTION INTRAMUSCULAR; INTRAVENOUS at 01:03

## 2025-03-25 RX ADMIN — POTASSIUM BICARBONATE 50 MEQ: 978 TABLET, EFFERVESCENT ORAL at 08:03

## 2025-03-25 RX ADMIN — GUAIFENESIN 400 MG: 200 SOLUTION ORAL at 02:03

## 2025-03-25 RX ADMIN — METHYLPREDNISOLONE SODIUM SUCCINATE 80 MG: 40 INJECTION, POWDER, FOR SOLUTION INTRAMUSCULAR; INTRAVENOUS at 08:03

## 2025-03-25 RX ADMIN — IPRATROPIUM BROMIDE AND ALBUTEROL SULFATE 3 ML: .5; 3 SOLUTION RESPIRATORY (INHALATION) at 06:03

## 2025-03-25 RX ADMIN — ASPIRIN 81 MG: 81 TABLET, COATED ORAL at 08:03

## 2025-03-25 RX ADMIN — GUAIFENESIN 400 MG: 200 SOLUTION ORAL at 08:03

## 2025-03-25 NOTE — RESPIRATORY THERAPY
03/25/25 1120   Home Oxygen Qualification   $ Home O2 Qualification Pulmonary Stress Test/6 min walk;Tech time 15 minutes   Room Air SpO2 At Rest 94 %   Room Air SpO2 During Ambulation 93 %   Home O2 Eval Comments   (Pt did not qualify for home o2 at this time.)

## 2025-03-25 NOTE — DISCHARGE INSTRUCTIONS
Discharge Instructions, [facility] Medicine    Thank you for choosing [facility] for your medical care. The primary doctor who is taking care of you at the time of your discharge is Ines Nguyen, *.     You were admitted to the hospital with COPD exacerbation.     Please note your discharge instructions, including diet/activity restrictions, follow-up appointments, and medication changes.  If you have any questions about your medical issues, prescriptions, or any other questions, please feel free to contact the [facility] Medicine Dept at [phone] and we will help.    If you are previously with Home health, outpatient PT/OT or under a therapy program, you are cleared to return to those programs.    Please direct all long term medication refills and follow up to your primary care provider, Scar Hussein MD. Thank you again for letting us take care of your health care needs.    Please note the following discharge instructions per your discharging physician-    Steroids as prescribed    Follow up with your PCP    Quit smoking with attached help    Talk to your PCP about further options to help you quit smoking    For any other worsening signs or symptoms, call your doctor or go to the emergency department    For any other emergency, call [phone]

## 2025-03-25 NOTE — PLAN OF CARE
Problem: COPD (Chronic Obstructive Pulmonary Disease)  Goal: Optimal Chronic Illness Coping  Outcome: Met  Goal: Optimal Level of Functional [place]  Outcome: Met  Goal: Absence of Infection Signs and Symptoms  Outcome: Met  Goal: Improved Oral Intake  Outcome: Met  Goal: Effective Oxygenation and Ventilation  Outcome: Met     Problem: Adult Inpatient Plan of Care  Goal: Plan of Care Review  Outcome: Met  Goal: Patient-Specific Goal (Individualized)  Outcome: Met  Goal: Absence of Hospital-Acquired Illness or Injury  Outcome: Met  Goal: Optimal Comfort and Wellbeing  Outcome: Met  Goal: Readiness for Transition of Care  Outcome: Met

## 2025-03-25 NOTE — PLAN OF CARE
Pt accepted with [facility]; SW requested SOC date.     03/25/25 1257   Post-Acute Status   Post-Acute Authorization Home Health   Home Health Status Set-up Complete/Auth obtained

## 2025-03-25 NOTE — RESPIRATORY THERAPY
03/25/25 0652   Patient Assessment/Suction   Level of Consciousness (AVPU) alert   Respiratory Effort Unlabored   All Lung Fields Breath Sounds diminished;wheezes, expiratory   PRE-TX-O2   Device (Oxygen Therapy) room air   SpO2 95 %   Pulse Oximetry Type Intermittent   $ Pulse Oximetry - Multiple Charge Pulse Oximetry - Multiple   Pulse 72   Resp 18   Aerosol Therapy   $ Aerosol Therapy Charges Aerosol Treatment   Respiratory Treatment Status (SVN) given   Treatment Route (SVN) mask   Patient Position HOB elevated   Post Treatment Assessment (SVN) breath sounds unchanged   Signs of Intolerance (SVN) none   Breath Sounds Post-Respiratory Treatment   Post-treatment Heart Rate (beats/min) 71   Post-treatment Resp Rate (breaths/min) 18

## 2025-03-25 NOTE — PROGRESS NOTES
03/25/25 1100   Tobacco Cessation Intervention   Do you use any type of tobacco product? Yes   Are you interested in quitting use of tobacco products? Not interested   Are you interested in Nicotine Replacement for symptom relief? No   $ Smoking Cessation Charges Smoking Cessation - Intermediate (CTTS)

## 2025-03-25 NOTE — PT/OT/SLP EVAL
Physical Therapy Evaluation    Patient Name:  Elissa Raymond   MRN:  [id number]    Recommendations:     Discharge Recommendations: Low Intensity Therapy (HHPT)   Discharge Equipment Recommendations: none   Barriers to discharge: None    Assessment:     Elissa Raymond is a 69 y.o. female admitted with a medical diagnosis of COPD exacerbation.  She presents with the following impairments/functional limitations: weakness, impaired endurance, impaired functional mobility, gait instability, impaired balance, decreased lower extremity function, decreased safety awareness, impaired cardiopulmonary response to activity. Patient is agreeable to participation with PT evaluation. She is legally blind due to macular degeneration. She reports a history of 3 CVAs. She lives with spouse and does not use an AD for ambulation at baseline. She performs supine to sit to stand with SBA. She declines using RW for ambulation and ambulated 100' with L HHA and CGA. She returned to sitting EOB with son present and all needs met. PT provided education that she should use her rollator for ambulation upon D/C.     Rehab Prognosis: Good; patient would benefit from acute skilled PT services to address these deficits and reach maximum level of function.    Recent Surgery: * No surgery found *      Plan:     During this hospitalization, patient to be seen 3 x/week to address the identified rehab impairments via gait training, therapeutic activities, therapeutic exercises and progress toward the following goals:    Plan of Care Expires:  04/25/25    Subjective     Chief Complaint: weak  Patient/Family Comments/goals: home  Pain/Comfort:  Pain Rating 1: 0/10    Patients cultural, spiritual, Sabianist conflicts given the current situation:      Living Environment:  Patient lives with spouse in a [facility] with no [place]. Spouse works from 4 AM-6 PM 4-5 days a week. While her spouse is at work she mostly sits at kitchen table or on porch  Prior to admission,  patients level of function was no AD for ambulation at baseline. She reports a history of 3 CVAs. She reports 1 fall in the past year. She has a hydraulic shower chair.  Equipment used at home: rollator.  DME owned (not currently used): none.  Upon discharge, patient will have assistance from HHPT and family.    Objective:     Communicated with PORFIRIO Armendariz prior to session.  Patient found HOB elevated with telemetry  upon PT entry to room.    General Precautions: Standard, fall, blind  Orthopedic Precautions:N/A   Braces: N/A  Respiratory Status: Room air    Exams:  RLE Strength: WFL  LLE Strength: WFL    Functional Mobility:  Bed Mobility:     Supine to Sit: stand by assistance  Transfers:     Sit to Stand:  stand by assistance with no AD  Gait: 100' with L HHA and CGA      AM-PAC 6 CLICK MOBILITY  Total Score:22       Treatment & Education:  Patient was educated on the importance of OOB activity and functional mobility to negate negative effects of prolonged bed rest during hospitalization, safe transfers and ambulation, and D/C planning     Patient left sitting edge of bed with all lines intact, call button in reach, RN notified, and son present.    GOALS:   Multidisciplinary Problems       Physical Therapy Goals          Problem: Physical Therapy    Goal Priority Disciplines Outcome Interventions   Physical Therapy Goal     PT, PT/OT     Description: Goals to be met by: 25    Patient will increase functional independence with mobility by performin. Supine to sit with Supervision  2. Sit to stand transfer with Supervision  3. Bed to chair transfer with Supervision using Rollator or no AD  4. Gait  x 250 feet with Supervision using Rollator or no AD  5. Lower extremity exercise program x20 reps per handout, with supervision                       DME Justifications:  No DME recommended requiring DME justifications    History:     Past Medical History:   Diagnosis Date    A-fib     Blind in both eyes      Hyperlipidemia     Hypertension     Stroke     x 3    Tobacco use 11/02/2017    Unspecified macular degeneration     Unspecified macular degeneration        Past Surgical History:   Procedure Laterality Date    ANGIOGRAPHY OF LOWER EXTREMITY N/A 11/01/2023    Procedure: ANGIOGRAM BILATERLA LOWER;  Surgeon: Martin Hair MD;  Location: [facility] CATH/EP LAB;  Service: General;  Laterality: N/A;    AORTIC VALVE REPLACEMENT      AORTOGRAPHY WITH EXTREMITY RUNOFF N/A 11/01/2023    Procedure: AORTOGRAM, WITH EXTREMITY RUNOFF;  Surgeon: Martin Hair MD;  Location: [facility] CATH/EP LAB;  Service: General;  Laterality: N/A;    BLADDER SURGERY      BLADDER SURGERY      Lift.    CATARACT EXTRACTION      CHOLECYSTECTOMY      HYSTERECTOMY      INSERTION OF IMPLANTABLE LOOP RECORDER N/A 6/7/2024    Procedure: Insertion, Implantable Loop Recorder;  Surgeon: Todd Agee MD;  Location: [facility] CATH/EP LAB;  Service: Cardiology;  Laterality: N/A;    KNEE SURGERY Left     TONSILLECTOMY         Time Tracking:     PT Received On: 03/25/25  PT Start Time: 1326     PT Stop Time: 1340  PT Total Time (min): 14 min     Billable Minutes: Evaluation 5 and Gait Training 9      03/25/2025

## 2025-03-25 NOTE — PLAN OF CARE
Patient cleared for discharge from case management standpoint.    Follow up appointments scheduled and added to AVS.    Chart and discharge orders reviewed.  Patient discharged home with [facility] (SOC 3/26) & no further case management needs.       03/25/25 1315   Final Note   Assessment Type Final Discharge Note   Anticipated Discharge Disposition [facility] Resources/Appts/Education Provided Provided patient/caregiver with written discharge plan information;Provided education on problems/symptoms using teachback;Appointments scheduled and added to AVS   Post-Acute Status   Post-Acute Authorization Home [facility]   Home Health Status Set-up Complete/Auth obtained   Discharge Delays None known at this time

## 2025-03-25 NOTE — DISCHARGE SUMMARY
[facility]  Discharge Summary      Patient Name: Elissa Raymond  MRN: [id number]  JOSEPH: [id number]  Patient Class: IP- Inpatient  Admission Date: 3/22/2025  Hospital Length of Stay: 2 days  Discharge Date and Time: 3/25/2025  4:23 PM  Attending Physician: No att. providers found   Discharging Provider: Ines [place] MD  Primary Care Provider: Scar Hussein MD    Primary Care Team: Networked reference to record PCT     HPI:   69-year-old presenting with shortness of breath. a past medical history of a TAVR in 2022, A-fib with an loop recorder placed in June 2024, CVA x 2 with no residual deficits and COPD (denies home O2 use).  About 1 month ago she was admitted to an outside hospital for COPD exacerbation.  She reports she feels that she is having the same today.  She continues to smoke although she says she has decreased the amount that she smokes significantly but when stressed reaches for a cigarette. Says she has to keep her inhaler close by as she is using it almost constatntly the last few days. Has long coughing spells feels that she may pass out. Was mid-80's on o2 sat on room air on arrival. Was told based on vq scan she may have pe. Does not want to take eliquis as she is scared she will  fall and bleed out. Hx also from her partner at bedside     Still smokes some   Non drinker    Lives at home with     She says she was better after she left last hospital 2/25/2025    Treated for COPD exacerbation   Sent home with steroid taper and abx    Got IV Doxy in hospital .  And then given zithromax note says         She said she did take all her meds at home    Said she was getting very dizzy once she got home .  With movement . Bending over or standing up .        No SOB     But then started having bad coughing spells     And more wheezing again     So she came to our ER         Impression: CTA chest      1. No evidence of pulmonary artery thromboembolism through  the segmental levels.  No acute intrathoracic abnormality.  2. Postoperative change of prior TAVR.  Cardiac loop recorder present within the left extra thoracic soft tissues.  3. Right middle lobe 3 mm pulmonary nodule.  For a solid nodule <6 mm, Fleischner Society 2017 guidelines recommend no routine follow up for a low risk patient, or follow-up with non-contrast chest CT at 12 months in a high risk patient.          IN OUR ER     Labs were normal     CTA results above   No PE   No PNA    She was Hypoxemic when she came sats in 80s     But since treated in ER was able to go to room air at rest       Got prednisone in ER  And Nebs       Plan is to admit for copd exacerbation     * No surgery found *      Hospital Course:   Elissa Raymond is a 69 year old female with a past medical history of tobacco use, COPD, CVA, TAVR, and HLD who presented with an acute COPD exacerbation.  She was given Duonebs, IV steroids and azithromycin and significantly improved.  On exam, wheezing improved.  She improved to room air and was able to ambulate without any oxygen requirement.  She had leukocytosis from high-dose IV steroids. Her symptoms significantly improved and she was requesting for discharge.  PT/OT consulted who recommended home health and case management arranged home health nurse.  She will continue her home inhalers and she will also complete a course of prednisone taper.  She will follow up with her PCP and pulmonologist as outpatient with repeat labs.  All instructions provided and patient verbalized understanding.  Strict return precautions advised.     Goals of Care Treatment Preferences:  Code Status: Full Code         Consults:     Assessment & Plan  COPD exacerbation  -continue steroids  -nebulization   -inhalers  Tobacco dependence  Consult for greater than 10 minutes, patient refused nicotine patch  SOB (shortness of breath)    Copd exacerbation   Acute hypoxemic respiratory failure  Resolved  Final Active  Diagnoses:    Diagnosis Date Noted POA    PRINCIPAL PROBLEM:  COPD exacerbation [J44.1] 03/23/2025 Yes    SOB (shortness of breath) [R06.02] 03/23/2025 Yes    Acute hypoxemic respiratory failure [J96.01] 03/23/2025 Yes    Tobacco dependence [F17.200] 06/06/2018 Yes      Problems Resolved During this Admission:       Discharged Condition: stable    Disposition: Home or Self Care    Follow Up:   Follow-up Information       Scar Hussein MD Follow up in 1 week(s).    Specialty: Internal Medicine  Contact information:  [address] [place] [address]  [phone]               Maehler, Jewel A., FNP. Go on 4/3/2025.    Specialty: Family Medicine  Why: Hospital follow up scheduled at 9:00 AM.  Contact information:  [address] [place] [address]  [address] [place] [address]  [phone]                           Patient Instructions:      CBC auto differential   Standing Status: Future Standing Exp. Date: 06/23/26     Order Specific Question Answer Comments   Send normal result to authorizing provider's In Basket if patient is active on MyChart: Yes      Ambulatory referral/consult to Home Health   Standing Status: Future   Referral Priority: Routine Referral Type: Home Health   Referral Reason: Specialty Services Required   Requested Specialty: Home Health Services   Number of Visits Requested: 1     Ambulatory referral/consult to Home Health   Standing Status: Future   Referral Priority: Routine Referral Type: Home Health   Referral Reason: Specialty Services Required   Requested Specialty: Home Health Services   Number of Visits Requested: 1     Notify your health care provider if you experience any of the following:  temperature >100.4     Notify your health care provider if you experience any of the following:  persistent nausea and vomiting or diarrhea     Notify your health care provider if you experience any of the following:  severe uncontrolled pain     Notify your health care provider if you experience any of the  following:  redness, tenderness, or signs of infection (pain, swelling, redness, odor or green/yellow discharge around incision site)     Notify your health care provider if you experience any of the following:  difficulty breathing or increased cough     Notify your health care provider if you experience any of the following:  severe persistent headache     Notify your health care provider if you experience any of the following:  worsening rash     Notify your health care provider if you experience any of the following:  persistent dizziness, light-headedness, or visual disturbances     Notify your health care provider if you experience any of the following:  increased confusion or weakness     Notify your health care provider if you experience any of the following:     Reason for not Ordering Smoking Cessation Referral     Order Specific Question Answer Comments   Reason for not ordering: Patient refused      Activity as tolerated       Significant Diagnostic Studies: N/A    Pending Diagnostic Studies:       None           Medications:  Reconciled Home Medications:      Medication List        PAUSE taking these medications      * CombiVENT RESPIMAT  mcg/actuation inhaler  Wait to take this until your doctor or other care provider tells you to start again.  Generic drug: ipratropium-albuteroL  INHALE 2 PUFFS INTO THE LUNGS EVERY 6 HOURS AS NEEDED FOR WHEEZING OR SHORTNESS OF BREATH OR RESCUE  You also have another medication with the same name that you may need to continue taking.           * This list has 1 medication(s) that are the same as other medications prescribed for you. Read the directions carefully, and ask your doctor or other care provider to review them with you.                START taking these medications      azithromycin 250 MG tablet  Commonly known as: Z-RENE  Take 2 tablets by mouth on day 1; Take 1 tablet by mouth on days 2-5     famotidine 20 MG tablet  Commonly known as: PEPCID  Take 1  tablet (20 mg total) by mouth once daily.  Start taking on: March 26, 2025     guaiFENesin 100 mg/5 ml 100 mg/5 mL syrup  Commonly known as: ROBITUSSIN  Take 20 mLs (400 mg total) by mouth 4 (four) times daily. for 10 days     predniSONE 20 MG tablet  Commonly known as: DELTASONE  Take 2 tablets (40 mg total) by mouth once daily. for 6 days  Start taking on: March 26, 2025            CHANGE how you take these medications      * albuterol-ipratropium 2.5 mg-0.5 mg/3 mL nebulizer solution  Commonly known as: DUO-NEB  Inhale 3 mLs into the lungs every 6 (six) hours as needed for Shortness of Breath.  What changed: Another medication with the same name was paused. Ask your nurse or doctor if you should take this medication.           * This list has 1 medication(s) that are the same as other medications prescribed for you. Read the directions carefully, and ask your doctor or other care provider to review them with you.                CONTINUE taking these medications      albuterol 90 mcg/actuation inhaler  Commonly known as: PROVENTIL/VENTOLIN HFA  Inhale 2 puffs into the lungs every 6 (six) hours as needed for Wheezing. Rescue     aspirin 81 MG EC tablet  Commonly known as: ECOTRIN  Take 1 tablet (81 mg total) by mouth once daily.     atorvastatin 40 MG tablet  Commonly known as: LIPITOR  Take 2 tablets (80 mg total) by mouth every evening.     budesonide-formoterol 160-4.5 mcg 160-4.5 mcg/actuation Hfaa  Commonly known as: SYMBICORT  Inhale 2 puffs into the lungs 2 (two) times daily.     CAPSICUM (CAYENNE) ORAL  Take 1 capsule by mouth Daily.     CINNAMON 500 mg capsule  Generic drug: cinnamon bark  Take 500 mg by mouth once daily.     GINGER EXTRACT ORAL  Take 1 capsule by mouth once daily.     OXYGEN-AIR DELIVERY SYSTEMS MISC  by Misc.(Non-Drug; Combo Route) route. 3 L by nasal route     vitamin D 1000 units Tab  Commonly known as: VITAMIN D3  Take 1,000 Units by mouth once daily.            STOP taking these  medications      NINJACOF 12.5-12.5 mg/5 mL Liqd  Generic drug: pyrilamine-chlophedianoL            ASK your doctor about these medications      coenzyme Q10 100 mg capsule  Take 2 capsules (200 mg total) by mouth once daily.     nicotine 21 mg/24 hr  Commonly known as: NICODERM CQ  Place 1 patch onto the skin once daily.              Indwelling Lines/Drains at time of discharge:   Lines/Drains/Airways       None                   Time spent on the discharge of patient: 40 minutes         Ines Nguyen MD  Department of Hospital Medicine  [facility]

## 2025-03-27 ENCOUNTER — HOSPITAL ENCOUNTER (INPATIENT)
Facility: HOSPITAL | Age: 70
LOS: 3 days | Discharge: HOME | DRG: 190 | End: 2025-03-30
Attending: STUDENT IN AN ORGANIZED HEALTH CARE EDUCATION/TRAINING PROGRAM | Admitting: INTERNAL MEDICINE
Payer: MEDICARE

## 2025-03-27 ENCOUNTER — HOSPITAL ENCOUNTER (OUTPATIENT)
Dept: CARDIOLOGY | Facility: CLINIC | Age: 70
Discharge: HOME | End: 2025-03-27
Attending: INTERNAL MEDICINE
Payer: MEDICARE

## 2025-03-27 ENCOUNTER — CLINICAL SUPPORT (OUTPATIENT)
Dept: CARDIOLOGY | Facility: CLINIC | Age: 70
End: 2025-03-27

## 2025-03-27 DIAGNOSIS — I16.0 HYPERTENSIVE URGENCY: ICD-10-CM

## 2025-03-27 DIAGNOSIS — J44.1 COPD EXACERBATION: Primary | ICD-10-CM

## 2025-03-27 DIAGNOSIS — I49.8 OTHER SPECIFIED CARDIAC ARRHYTHMIAS: ICD-10-CM

## 2025-03-27 DIAGNOSIS — Z13.6 SCREENING FOR CARDIOVASCULAR CONDITION: ICD-10-CM

## 2025-03-27 DIAGNOSIS — R06.00 DYSPNEA: ICD-10-CM

## 2025-03-27 LAB
ABSOLUTE EOSINOPHIL (SMH): 0.15 K/UL
ABSOLUTE MONOCYTE (SMH): 0.86 K/UL (ref 0.3–1)
ABSOLUTE NEUTROPHIL COUNT (SMH): 8.8 K/UL (ref 1.8–7.7)
ALBUMIN SERPL-MCNC: 4 G/DL (ref 3.5–5.2)
ALLENS TEST: ABNORMAL
ALLENS TEST: NORMAL
ALP SERPL-CCNC: 55 UNIT/L (ref 55–135)
ALT SERPL-CCNC: 24 UNIT/L (ref 10–44)
ANION GAP (SMH): 8 MMOL/L (ref 8–16)
AST SERPL-CCNC: 31 UNIT/L (ref 10–40)
BASOPHILS # BLD AUTO: 0.02 K/UL
BASOPHILS NFR BLD AUTO: 0.2 %
BILIRUB SERPL-MCNC: 0.7 MG/DL (ref 0.1–1)
BNP SERPL-MCNC: 64 PG/ML
BUN SERPL-MCNC: 24 MG/DL (ref 8–23)
CALCIUM SERPL-MCNC: 9 MG/DL (ref 8.7–10.5)
CHLORIDE SERPL-SCNC: 101 MMOL/L (ref 95–110)
CO2 SERPL-SCNC: 29 MMOL/L (ref 23–29)
CREAT SERPL-MCNC: 1.4 MG/DL (ref 0.5–1.4)
DELSYS: ABNORMAL
DELSYS: NORMAL
ERYTHROCYTE [DISTWIDTH] IN BLOOD BY AUTOMATED COUNT: 14.2 % (ref 11.5–14.5)
FLOW: 3
FLOW: 4
GFR SERPLBLD CREATININE-BSD FMLA CKD-EPI: 41 ML/MIN/1.73/M2
GLUCOSE SERPL-MCNC: 100 MG/DL (ref 70–110)
GLUCOSE SERPL-MCNC: 84 MG/DL (ref 70–110)
GLUCOSE SERPL-MCNC: 86 MG/DL (ref 70–110)
HCO3 UR-SCNC: 26.5 MMOL/L (ref 24–28)
HCO3 UR-SCNC: 28.8 MMOL/L (ref 24–28)
HCT VFR BLD AUTO: 46.3 % (ref 37–48.5)
HCT VFR BLD CALC: 42 %PCV (ref 36–54)
HCT VFR BLD CALC: 48 %PCV (ref 36–54)
HCV AB SERPL QL IA: NORMAL
HGB BLD-MCNC: 15.1 GM/DL (ref 12–16)
HIV 1+2 AB+HIV1 P24 AG SERPL QL IA: NORMAL
IMM GRANULOCYTES # BLD AUTO: 0.16 K/UL (ref 0–0.04)
IMM GRANULOCYTES NFR BLD AUTO: 1.4 % (ref 0–0.5)
INFLUENZA A MOLECULAR (OHS): POSITIVE
INFLUENZA B MOLECULAR (OHS): NEGATIVE
LACTATE SERPL-SCNC: 0.8 MMOL/L (ref 0.5–1.9)
LYMPHOCYTES # BLD AUTO: 1.72 K/UL (ref 1–4.8)
MCH RBC QN AUTO: 30.3 PG (ref 27–31)
MCHC RBC AUTO-ENTMCNC: 32.6 G/DL (ref 32–36)
MCV RBC AUTO: 93 FL (ref 82–98)
MODE: ABNORMAL
MODE: NORMAL
NUCLEATED RBC (/100WBC) (SMH): 0 /100 WBC
OHS QRS DURATION: 66 MS
OHS QTC CALCULATION: 466 MS
PCO2 BLDA: 43.7 MMHG (ref 35–45)
PCO2 BLDA: 49.1 MMHG (ref 35–45)
PH SMN: 7.38 [PH] (ref 7.35–7.45)
PH SMN: 7.39 [PH] (ref 7.35–7.45)
PLATELET # BLD AUTO: 151 K/UL (ref 150–450)
PMV BLD AUTO: 12 FL (ref 9.2–12.9)
PO2 BLDA: 21 MMHG (ref 40–60)
PO2 BLDA: 56 MMHG (ref 40–60)
POC BE: 2 MMOL/L
POC BE: 4 MMOL/L
POC IONIZED CALCIUM: 1.12 MMOL/L (ref 1.06–1.42)
POC IONIZED CALCIUM: 1.12 MMOL/L (ref 1.06–1.42)
POC SATURATED O2: 33 % (ref 95–100)
POC SATURATED O2: 88 % (ref 95–100)
POC TCO2: 28 MMOL/L (ref 24–29)
POC TCO2: 30 MMOL/L (ref 24–29)
POTASSIUM BLD-SCNC: 3.9 MMOL/L (ref 3.5–5.1)
POTASSIUM BLD-SCNC: 4 MMOL/L (ref 3.5–5.1)
POTASSIUM SERPL-SCNC: 3.9 MMOL/L (ref 3.5–5.1)
PROT SERPL-MCNC: 6.7 GM/DL (ref 6–8.4)
RBC # BLD AUTO: 4.99 M/UL (ref 4–5.4)
RELATIVE EOSINOPHIL (SMH): 1.3 % (ref 0–8)
RELATIVE LYMPHOCYTE (SMH): 14.7 % (ref 18–48)
RELATIVE MONOCYTE (SMH): 7.3 % (ref 4–15)
RELATIVE NEUTROPHIL (SMH): 75.1 % (ref 38–73)
RSV AG SPEC QL IA: NEGATIVE
SAMPLE: ABNORMAL
SAMPLE: NORMAL
SARS-COV-2 RDRP RESP QL NAA+PROBE: NEGATIVE
SITE: ABNORMAL
SITE: NORMAL
SODIUM BLD-SCNC: 136 MMOL/L (ref 136–145)
SODIUM BLD-SCNC: 137 MMOL/L (ref 136–145)
SODIUM SERPL-SCNC: 138 MMOL/L (ref 136–145)
TROPONIN HIGH SENSITIVE (SMH): 14.7 PG/ML
TROPONIN HIGH SENSITIVE (SMH): 18 PG/ML
WBC # BLD AUTO: 11.72 K/UL (ref 3.9–12.7)

## 2025-03-27 PROCEDURE — 86803 HEPATITIS C AB TEST: CPT | Performed by: EMERGENCY MEDICINE

## 2025-03-27 PROCEDURE — 84460 ALANINE AMINO (ALT) (SGPT): CPT | Performed by: NURSE PRACTITIONER

## 2025-03-27 PROCEDURE — 96375 TX/PRO/DX INJ NEW DRUG ADDON: CPT

## 2025-03-27 PROCEDURE — 94799 UNLISTED PULMONARY SVC/PX: CPT

## 2025-03-27 PROCEDURE — 83880 ASSAY OF NATRIURETIC PEPTIDE: CPT | Performed by: STUDENT IN AN ORGANIZED HEALTH CARE EDUCATION/TRAINING PROGRAM

## 2025-03-27 PROCEDURE — 94640 AIRWAY INHALATION TREATMENT: CPT

## 2025-03-27 PROCEDURE — 63600175 PHARM REV CODE 636 W HCPCS: Mod: JZ,TB | Performed by: STUDENT IN AN ORGANIZED HEALTH CARE EDUCATION/TRAINING PROGRAM

## 2025-03-27 PROCEDURE — 27000221 HC OXYGEN, UP TO 24 HOURS

## 2025-03-27 PROCEDURE — 93010 ELECTROCARDIOGRAM REPORT: CPT | Mod: ,,, | Performed by: INTERNAL MEDICINE

## 2025-03-27 PROCEDURE — 82330 ASSAY OF CALCIUM: CPT

## 2025-03-27 PROCEDURE — 84132 ASSAY OF SERUM POTASSIUM: CPT

## 2025-03-27 PROCEDURE — 84295 ASSAY OF SERUM SODIUM: CPT

## 2025-03-27 PROCEDURE — 25000003 PHARM REV CODE 250: Performed by: STUDENT IN AN ORGANIZED HEALTH CARE EDUCATION/TRAINING PROGRAM

## 2025-03-27 PROCEDURE — 93005 ELECTROCARDIOGRAM TRACING: CPT | Performed by: INTERNAL MEDICINE

## 2025-03-27 PROCEDURE — 87040 BLOOD CULTURE FOR BACTERIA: CPT | Performed by: NURSE PRACTITIONER

## 2025-03-27 PROCEDURE — 25000242 PHARM REV CODE 250 ALT 637 W/ HCPCS: Performed by: STUDENT IN AN ORGANIZED HEALTH CARE EDUCATION/TRAINING PROGRAM

## 2025-03-27 PROCEDURE — 83605 ASSAY OF LACTIC ACID: CPT | Performed by: NURSE PRACTITIONER

## 2025-03-27 PROCEDURE — 94761 N-INVAS EAR/PLS OXIMETRY MLT: CPT | Mod: XB

## 2025-03-27 PROCEDURE — 87389 HIV-1 AG W/HIV-1&-2 AB AG IA: CPT | Performed by: EMERGENCY MEDICINE

## 2025-03-27 PROCEDURE — 85025 COMPLETE CBC W/AUTO DIFF WBC: CPT | Performed by: NURSE PRACTITIONER

## 2025-03-27 PROCEDURE — 96365 THER/PROPH/DIAG IV INF INIT: CPT

## 2025-03-27 PROCEDURE — U0002 COVID-19 LAB TEST NON-CDC: HCPCS | Performed by: STUDENT IN AN ORGANIZED HEALTH CARE EDUCATION/TRAINING PROGRAM

## 2025-03-27 PROCEDURE — 99900031 HC PATIENT EDUCATION (STAT)

## 2025-03-27 PROCEDURE — 84484 ASSAY OF TROPONIN QUANT: CPT | Performed by: STUDENT IN AN ORGANIZED HEALTH CARE EDUCATION/TRAINING PROGRAM

## 2025-03-27 PROCEDURE — 93298 REM INTERROG DEV EVAL SCRMS: CPT | Mod: PN | Performed by: INTERNAL MEDICINE

## 2025-03-27 PROCEDURE — 36415 COLL VENOUS BLD VENIPUNCTURE: CPT | Performed by: NURSE PRACTITIONER

## 2025-03-27 PROCEDURE — 82803 BLOOD GASES ANY COMBINATION: CPT

## 2025-03-27 PROCEDURE — 11000001 HC ACUTE MED/SURG PRIVATE ROOM

## 2025-03-27 PROCEDURE — 87502 INFLUENZA DNA AMP PROBE: CPT | Performed by: STUDENT IN AN ORGANIZED HEALTH CARE EDUCATION/TRAINING PROGRAM

## 2025-03-27 PROCEDURE — 99291 CRITICAL CARE FIRST HOUR: CPT

## 2025-03-27 PROCEDURE — 99900035 HC TECH TIME PER 15 MIN (STAT)

## 2025-03-27 PROCEDURE — 85014 HEMATOCRIT: CPT

## 2025-03-27 PROCEDURE — 87634 RSV DNA/RNA AMP PROBE: CPT | Performed by: STUDENT IN AN ORGANIZED HEALTH CARE EDUCATION/TRAINING PROGRAM

## 2025-03-27 PROCEDURE — 94644 CONT INHLJ TX 1ST HOUR: CPT

## 2025-03-27 RX ORDER — LEVOFLOXACIN 5 MG/ML
750 INJECTION, SOLUTION INTRAVENOUS
Status: COMPLETED | OUTPATIENT
Start: 2025-03-27 | End: 2025-03-27

## 2025-03-27 RX ORDER — MAGNESIUM SULFATE HEPTAHYDRATE 40 MG/ML
2 INJECTION, SOLUTION INTRAVENOUS ONCE
Status: COMPLETED | OUTPATIENT
Start: 2025-03-27 | End: 2025-03-27

## 2025-03-27 RX ORDER — IPRATROPIUM BROMIDE AND ALBUTEROL SULFATE 2.5; .5 MG/3ML; MG/3ML
9 SOLUTION RESPIRATORY (INHALATION)
Status: COMPLETED | OUTPATIENT
Start: 2025-03-27 | End: 2025-03-27

## 2025-03-27 RX ORDER — IPRATROPIUM BROMIDE AND ALBUTEROL SULFATE 2.5; .5 MG/3ML; MG/3ML
3 SOLUTION RESPIRATORY (INHALATION)
Status: COMPLETED | OUTPATIENT
Start: 2025-03-27 | End: 2025-03-27

## 2025-03-27 RX ORDER — METHYLPREDNISOLONE SOD SUCC 125 MG
125 VIAL (EA) INJECTION
Status: COMPLETED | OUTPATIENT
Start: 2025-03-27 | End: 2025-03-27

## 2025-03-27 RX ORDER — LABETALOL HYDROCHLORIDE 5 MG/ML
5 INJECTION, SOLUTION INTRAVENOUS
Status: COMPLETED | OUTPATIENT
Start: 2025-03-27 | End: 2025-03-27

## 2025-03-27 RX ADMIN — NITROGLYCERIN 0.5 INCH: 20 OINTMENT TOPICAL at 07:03

## 2025-03-27 RX ADMIN — LABETALOL HYDROCHLORIDE 5 MG: 5 INJECTION, SOLUTION INTRAVENOUS at 07:03

## 2025-03-27 RX ADMIN — IPRATROPIUM BROMIDE AND ALBUTEROL SULFATE 3 ML: .5; 3 SOLUTION RESPIRATORY (INHALATION) at 09:03

## 2025-03-27 RX ADMIN — LEVOFLOXACIN 750 MG: 5 INJECTION, SOLUTION INTRAVENOUS at 08:03

## 2025-03-27 RX ADMIN — MAGNESIUM SULFATE IN WATER 2 G: 40 INJECTION, SOLUTION INTRAVENOUS at 07:03

## 2025-03-27 RX ADMIN — METHYLPREDNISOLONE SODIUM SUCCINATE 125 MG: 125 INJECTION, POWDER, FOR SOLUTION INTRAMUSCULAR; INTRAVENOUS at 07:03

## 2025-03-27 RX ADMIN — IPRATROPIUM BROMIDE AND ALBUTEROL SULFATE 9 ML: .5; 3 SOLUTION RESPIRATORY (INHALATION) at 06:03

## 2025-03-27 NOTE — Clinical Note
AVS virtually reviewed with patient in its entirety with emphasis on diet, medications, follow-up appointments and reasons to return to the ED or contact the [facility] On Call Nurse Care Line. Patient also encouraged to utilize their patient portal. Ease and convenience of use reiterated. Education complete and patient voiced understanding. All questions answered. Discharge teaching complete.

## 2025-03-27 NOTE — Clinical Note
Diagnosis: COPD exacerbation [336746]   Admit to which facility:: [facility] [[id number]]   Reason for IP Medical Treatment  (Clinical interventions that can only be accomplished in the IP setting? ) :: COPD Exacerbation/HTN urgency

## 2025-03-27 NOTE — FIRST PROVIDER EVALUATION
Emergency Department TeleTriage Encounter Note      CHIEF COMPLAINT    Chief Complaint   Patient presents with    Shortness of Breath       VITAL SIGNS   Initial Vitals [03/27/25 1207]   BP Pulse Resp Temp SpO2   (!) 161/84 91 20 98.2 °F (36.8 °C) (!) 92 %      MAP       --            ALLERGIES    Review of patient's allergies indicates:   Allergen Reactions    Pcn [penicillins] Other (See Comments)     Unable to obtain       PROVIDER TRIAGE NOTE    SOB and chest pain, coming via EMS. Recently diagnosed with UTI and was hospitalized. Denies fever.     Patient hard of hearing and legally blind. Assisted by nursing staff for teletriage.    Limited physical exam via telehealth: The patient is awake, alert, answering questions appropriately and is not in respiratory distress.  As the Teletriage provider, I performed an initial assessment and ordered appropriate labs and imaging studies, if any, to facilitate the patient's care once placed in the ED. Once a room is available, care and a full evaluation will be completed by an alternate ED provider.  Any additional orders and the final disposition will be determined by that provider.  All imaging and labs will not be followed-up by the Teletriage Team, including myself.        ORDERS  Labs Reviewed   HEPATITIS C ANTIBODY   HIV 1 / 2 ANTIBODY       ED Orders (720h ago, onward)      Start Ordered     Status Ordering Provider    03/27/25 1824 03/27/25 1423  POCT Venous Blood Gas (Lactate) #2  Once        Comments: This test should be used for VBGs.  If using this order for other tests (K, creatinine, HCT, PT/INR, lactate etc)  ONLY do so in the case of an emergency or rapid response.Notify Physician if: see parameters below.      Ordered MARLENE VASQUEZ.    03/27/25 1424 03/27/25 1423  ED Preference List Used to Initiate Sepsis Orders  Until discontinued         Ordered MARLENE VASQUEZ.    03/27/25 1424 03/27/25 1423  Blood culture x two cultures. Draw prior to  antibiotics.  Every 15 min      Comments: Aerobic and anaerobic     Order ID Start Status Ordering Provider   [id number] 03/27/25 1424 Ordered HARIDARMARLENE   [id number] 03/27/25 1439 Ordered DARDARMARLENE A.       Ordered DARDAR, MARLENE A.    03/27/25 1424 03/27/25 1423  CBC auto differential  STAT         Ordered DARDARSTEVEA A.    03/27/25 1424 03/27/25 1423  Comprehensive metabolic panel  STAT         Ordered DARDAR, MARLENE A.    03/27/25 1424 03/27/25 1423  Vital signs  Every 15 min        Comments: Every 15 minutes until SBP greater than 90 or MAP greater than 65, then every 30 minutes times one hour, then every one hour.    Ordered HARIDARSTEVEA A.    03/27/25 1424 03/27/25 1423  Bed rest  Until discontinued         Ordered DARDAR, MARLENE A.    03/27/25 1424 03/27/25 1423  Cardiac Monitoring - Adult  Continuous        Comments: Notify Physician If:    Ordered DARDARSTEVEA A.    03/27/25 1424 03/27/25 1423  Pulse Oximetry Continuous  Continuous         Ordered DARDARWESTONMARLENE A.    03/27/25 1424 03/27/25 1423  Strict intake and output  Until discontinued         Ordered DARDAR MARLENE A.    03/27/25 1424 03/27/25 1423  Urinalysis, Reflex to Urine Culture  STAT         Ordered HARIDARWESTONMARLENE A.    03/27/25 1424 03/27/25 1423  Saline lock IV  Once         Ordered DARDAR, MARLENE A.    03/27/25 1424 03/27/25 1423  EKG 12-lead  Once         Ordered DARDAR, MARLENE A.    03/27/25 1424 03/27/25 1423  X-Ray Chest AP Portable  1 time imaging         Ordered HARIDARSTEVEA A.    03/27/25 1424 03/27/25 1423  POCT Venous Blood Gas (Lactate) #1  Once        Comments: This test should be used for VBGs.  If using this order for other tests (K, creatinine, HCT, PT/INR, lactate etc)  ONLY do so in the case of an emergency or rapid response.Notify Physician if: see parameters below.      Ordered MARLENE VASQUEZ    03/27/25 1424 03/27/25 1423  CBC with Differential  PROCEDURE ONCE         Ordered MARLENE VASQUEZ     03/27/25 1340 03/27/25 1339  Hepatitis C Antibody  STAT         Ordered MELANY GROVES    03/27/25 1340 03/27/25 1339  HIV 1/2 Ag/Ab (4th Gen)  STAT         Ordered MELANY GROVES              Virtual Visit Note: The provider triage portion of this emergency department evaluation and documentation was performed via [employer], a HIPAA-compliant telemedicine application, in concert with a tele-presenter in the room. A face to face patient evaluation with one of my colleagues will occur once the patient is placed in an emergency department room.      DISCLAIMER: This note was prepared with [employer] voice recognition transcription software. Garbled syntax, mangled pronouns, and other bizarre constructions may be attributed to that software system.

## 2025-03-28 PROBLEM — J10.1 INFLUENZA A: Status: ACTIVE | Noted: 2025-03-28

## 2025-03-28 LAB
ABSOLUTE EOSINOPHIL (SMH): 0 K/UL
ABSOLUTE MONOCYTE (SMH): 0.12 K/UL (ref 0.3–1)
ABSOLUTE NEUTROPHIL COUNT (SMH): 10.9 K/UL (ref 1.8–7.7)
ALLENS TEST: ABNORMAL
ANION GAP (SMH): 12 MMOL/L (ref 8–16)
BASOPHILS # BLD AUTO: 0.01 K/UL
BASOPHILS NFR BLD AUTO: 0.1 %
BILIRUB UR QL STRIP.AUTO: NEGATIVE
BUN SERPL-MCNC: 32 MG/DL (ref 8–23)
CALCIUM SERPL-MCNC: 8.2 MG/DL (ref 8.7–10.5)
CHLORIDE SERPL-SCNC: 102 MMOL/L (ref 95–110)
CLARITY UR: CLEAR
CO2 SERPL-SCNC: 20 MMOL/L (ref 23–29)
COLOR UR AUTO: YELLOW
CREAT SERPL-MCNC: 1.4 MG/DL (ref 0.5–1.4)
DELSYS: ABNORMAL
ERYTHROCYTE [DISTWIDTH] IN BLOOD BY AUTOMATED COUNT: 13.4 % (ref 11.5–14.5)
FLOW: 6
GFR SERPLBLD CREATININE-BSD FMLA CKD-EPI: 41 ML/MIN/1.73/M2
GLUCOSE SERPL-MCNC: 222 MG/DL (ref 70–110)
GLUCOSE UR QL STRIP: NEGATIVE
HCO3 UR-SCNC: 25.2 MMOL/L (ref 24–28)
HCT VFR BLD AUTO: 41.6 % (ref 37–48.5)
HGB BLD-MCNC: 13.3 GM/DL (ref 12–16)
HGB UR QL STRIP: NEGATIVE
IMM GRANULOCYTES # BLD AUTO: 0.18 K/UL (ref 0–0.04)
IMM GRANULOCYTES NFR BLD AUTO: 1.5 % (ref 0–0.5)
KETONES UR QL STRIP: NEGATIVE
LEUKOCYTE ESTERASE UR QL STRIP: NEGATIVE
LYMPHOCYTES # BLD AUTO: 0.5 K/UL (ref 1–4.8)
MAGNESIUM SERPL-MCNC: 2.9 MG/DL (ref 1.6–2.6)
MCH RBC QN AUTO: 29.8 PG (ref 27–31)
MCHC RBC AUTO-ENTMCNC: 32 G/DL (ref 32–36)
MCV RBC AUTO: 93 FL (ref 82–98)
MICROSCOPIC COMMENT: NORMAL
MODE: ABNORMAL
NITRITE UR QL STRIP: NEGATIVE
NUCLEATED RBC (/100WBC) (SMH): 0 /100 WBC
PCO2 BLDA: 42.6 MMHG (ref 35–45)
PH SMN: 7.38 [PH] (ref 7.35–7.45)
PH UR STRIP: 7 [PH]
PHOSPHATE SERPL-MCNC: 5.2 MG/DL (ref 2.7–4.5)
PLATELET # BLD AUTO: 136 K/UL (ref 150–450)
PMV BLD AUTO: 12.6 FL (ref 9.2–12.9)
PO2 BLDA: 71 MMHG (ref 80–100)
POC BE: 0 MMOL/L
POC SATURATED O2: 94 % (ref 95–100)
POC TCO2: 27 MMOL/L (ref 23–27)
POTASSIUM SERPL-SCNC: 4.5 MMOL/L (ref 3.5–5.1)
PROT UR QL STRIP: ABNORMAL
RBC # BLD AUTO: 4.46 M/UL (ref 4–5.4)
RBC #/AREA URNS AUTO: 1 /HPF
RELATIVE EOSINOPHIL (SMH): 0 % (ref 0–8)
RELATIVE LYMPHOCYTE (SMH): 4.3 % (ref 18–48)
RELATIVE MONOCYTE (SMH): 1 % (ref 4–15)
RELATIVE NEUTROPHIL (SMH): 93.1 % (ref 38–73)
SAMPLE: ABNORMAL
SITE: ABNORMAL
SODIUM SERPL-SCNC: 134 MMOL/L (ref 136–145)
SP GR UR STRIP: 1.02
SP02: 95
SQUAMOUS #/AREA URNS AUTO: 1 /HPF
UROBILINOGEN UR STRIP-ACNC: NEGATIVE EU/DL
WBC # BLD AUTO: 11.74 K/UL (ref 3.9–12.7)
WBC #/AREA URNS AUTO: 3 /HPF

## 2025-03-28 PROCEDURE — 84100 ASSAY OF PHOSPHORUS: CPT

## 2025-03-28 PROCEDURE — 85025 COMPLETE CBC W/AUTO DIFF WBC: CPT

## 2025-03-28 PROCEDURE — 36600 WITHDRAWAL OF ARTERIAL BLOOD: CPT

## 2025-03-28 PROCEDURE — 27000221 HC OXYGEN, UP TO 24 HOURS

## 2025-03-28 PROCEDURE — 80048 BASIC METABOLIC PNL TOTAL CA: CPT

## 2025-03-28 PROCEDURE — 82803 BLOOD GASES ANY COMBINATION: CPT

## 2025-03-28 PROCEDURE — 25000242 PHARM REV CODE 250 ALT 637 W/ HCPCS: Performed by: INTERNAL MEDICINE

## 2025-03-28 PROCEDURE — 94640 AIRWAY INHALATION TREATMENT: CPT

## 2025-03-28 PROCEDURE — 81003 URINALYSIS AUTO W/O SCOPE: CPT | Performed by: NURSE PRACTITIONER

## 2025-03-28 PROCEDURE — 25000003 PHARM REV CODE 250

## 2025-03-28 PROCEDURE — 94761 N-INVAS EAR/PLS OXIMETRY MLT: CPT | Mod: XB

## 2025-03-28 PROCEDURE — 27000207 HC ISOLATION

## 2025-03-28 PROCEDURE — 25000242 PHARM REV CODE 250 ALT 637 W/ HCPCS

## 2025-03-28 PROCEDURE — 83735 ASSAY OF MAGNESIUM: CPT

## 2025-03-28 PROCEDURE — 36415 COLL VENOUS BLD VENIPUNCTURE: CPT

## 2025-03-28 PROCEDURE — 99900035 HC TECH TIME PER 15 MIN (STAT)

## 2025-03-28 PROCEDURE — 11000001 HC ACUTE MED/SURG PRIVATE ROOM

## 2025-03-28 PROCEDURE — 63600175 PHARM REV CODE 636 W HCPCS

## 2025-03-28 RX ORDER — SODIUM,POTASSIUM PHOSPHATES 280-250MG
2 POWDER IN PACKET (EA) ORAL
Status: DISCONTINUED | OUTPATIENT
Start: 2025-03-28 | End: 2025-03-30 | Stop reason: HOSPADM

## 2025-03-28 RX ORDER — ACETAMINOPHEN AND CODEINE PHOSPHATE 120; 12 MG/5ML; MG/5ML
10 SOLUTION ORAL ONCE
Status: COMPLETED | OUTPATIENT
Start: 2025-03-28 | End: 2025-03-28

## 2025-03-28 RX ORDER — ONDANSETRON HYDROCHLORIDE 2 MG/ML
4 INJECTION, SOLUTION INTRAVENOUS EVERY 12 HOURS PRN
Status: DISCONTINUED | OUTPATIENT
Start: 2025-03-28 | End: 2025-03-30 | Stop reason: HOSPADM

## 2025-03-28 RX ORDER — GUAIFENESIN 600 MG/1
600 TABLET, EXTENDED RELEASE ORAL 2 TIMES DAILY
Status: DISCONTINUED | OUTPATIENT
Start: 2025-03-28 | End: 2025-03-30 | Stop reason: HOSPADM

## 2025-03-28 RX ORDER — PREDNISONE 20 MG/1
40 TABLET ORAL DAILY
Status: DISCONTINUED | OUTPATIENT
Start: 2025-03-28 | End: 2025-03-28

## 2025-03-28 RX ORDER — ACETAMINOPHEN 325 MG/1
650 TABLET ORAL EVERY 8 HOURS PRN
Status: DISCONTINUED | OUTPATIENT
Start: 2025-03-28 | End: 2025-03-30 | Stop reason: HOSPADM

## 2025-03-28 RX ORDER — LEVOFLOXACIN 5 MG/ML
750 INJECTION, SOLUTION INTRAVENOUS
Status: DISCONTINUED | OUTPATIENT
Start: 2025-03-29 | End: 2025-03-30 | Stop reason: HOSPADM

## 2025-03-28 RX ORDER — IPRATROPIUM BROMIDE AND ALBUTEROL SULFATE 2.5; .5 MG/3ML; MG/3ML
3 SOLUTION RESPIRATORY (INHALATION) EVERY 4 HOURS
Status: DISCONTINUED | OUTPATIENT
Start: 2025-03-28 | End: 2025-03-30 | Stop reason: HOSPADM

## 2025-03-28 RX ORDER — BUDESONIDE 0.5 MG/2ML
0.5 INHALANT ORAL EVERY 12 HOURS
Status: DISCONTINUED | OUTPATIENT
Start: 2025-03-28 | End: 2025-03-30 | Stop reason: HOSPADM

## 2025-03-28 RX ORDER — PROCHLORPERAZINE EDISYLATE 5 MG/ML
5 INJECTION INTRAMUSCULAR; INTRAVENOUS EVERY 6 HOURS PRN
Status: DISCONTINUED | OUTPATIENT
Start: 2025-03-28 | End: 2025-03-30 | Stop reason: HOSPADM

## 2025-03-28 RX ORDER — SODIUM CHLORIDE, SODIUM LACTATE, POTASSIUM CHLORIDE, CALCIUM CHLORIDE 600; 310; 30; 20 MG/100ML; MG/100ML; MG/100ML; MG/100ML
INJECTION, SOLUTION INTRAVENOUS CONTINUOUS
Status: ACTIVE | OUTPATIENT
Start: 2025-03-28 | End: 2025-03-28

## 2025-03-28 RX ORDER — LANOLIN ALCOHOL/MO/W.PET/CERES
800 CREAM (GRAM) TOPICAL
Status: DISCONTINUED | OUTPATIENT
Start: 2025-03-28 | End: 2025-03-30 | Stop reason: HOSPADM

## 2025-03-28 RX ORDER — PANTOPRAZOLE SODIUM 40 MG/1
40 TABLET, DELAYED RELEASE ORAL DAILY
Status: DISCONTINUED | OUTPATIENT
Start: 2025-03-28 | End: 2025-03-30 | Stop reason: HOSPADM

## 2025-03-28 RX ORDER — GUAIFENESIN 600 MG/1
600 TABLET, EXTENDED RELEASE ORAL 2 TIMES DAILY
Status: DISCONTINUED | OUTPATIENT
Start: 2025-03-28 | End: 2025-03-28

## 2025-03-28 RX ORDER — FAMOTIDINE 20 MG/1
20 TABLET, FILM COATED ORAL DAILY
Status: DISCONTINUED | OUTPATIENT
Start: 2025-03-28 | End: 2025-03-28

## 2025-03-28 RX ORDER — IPRATROPIUM BROMIDE AND ALBUTEROL SULFATE 2.5; .5 MG/3ML; MG/3ML
3 SOLUTION RESPIRATORY (INHALATION)
Status: DISCONTINUED | OUTPATIENT
Start: 2025-03-28 | End: 2025-03-28

## 2025-03-28 RX ORDER — ENOXAPARIN SODIUM 100 MG/ML
40 INJECTION SUBCUTANEOUS EVERY 24 HOURS
Status: DISCONTINUED | OUTPATIENT
Start: 2025-03-28 | End: 2025-03-30 | Stop reason: HOSPADM

## 2025-03-28 RX ORDER — GUAIFENESIN AND DEXTROMETHORPHAN HYDROBROMIDE 10; 100 MG/5ML; MG/5ML
10 SYRUP ORAL ONCE
Status: COMPLETED | OUTPATIENT
Start: 2025-03-28 | End: 2025-03-28

## 2025-03-28 RX ORDER — SODIUM CHLORIDE 0.9 % (FLUSH) 0.9 %
3 SYRINGE (ML) INJECTION
Status: DISCONTINUED | OUTPATIENT
Start: 2025-03-28 | End: 2025-03-30 | Stop reason: HOSPADM

## 2025-03-28 RX ORDER — ASPIRIN 81 MG/1
81 TABLET ORAL DAILY
Status: DISCONTINUED | OUTPATIENT
Start: 2025-03-28 | End: 2025-03-30 | Stop reason: HOSPADM

## 2025-03-28 RX ORDER — OSELTAMIVIR PHOSPHATE 75 MG/1
75 CAPSULE ORAL 2 TIMES DAILY
Status: DISCONTINUED | OUTPATIENT
Start: 2025-03-28 | End: 2025-03-28

## 2025-03-28 RX ADMIN — GUAIFENESIN 600 MG: 600 TABLET, EXTENDED RELEASE ORAL at 09:03

## 2025-03-28 RX ADMIN — SODIUM CHLORIDE, POTASSIUM CHLORIDE, SODIUM LACTATE AND CALCIUM CHLORIDE: 600; 310; 30; 20 INJECTION, SOLUTION INTRAVENOUS at 01:03

## 2025-03-28 RX ADMIN — IPRATROPIUM BROMIDE AND ALBUTEROL SULFATE 3 ML: .5; 3 SOLUTION RESPIRATORY (INHALATION) at 07:03

## 2025-03-28 RX ADMIN — ACETAMINOPHEN AND CODEINE PHOSPHATE 10 ML: 120; 12 SOLUTION ORAL at 02:03

## 2025-03-28 RX ADMIN — METHYLPREDNISOLONE SODIUM SUCCINATE 40 MG: 40 INJECTION, POWDER, FOR SOLUTION INTRAMUSCULAR; INTRAVENOUS at 09:03

## 2025-03-28 RX ADMIN — PANTOPRAZOLE SODIUM 40 MG: 40 TABLET, DELAYED RELEASE ORAL at 09:03

## 2025-03-28 RX ADMIN — BUDESONIDE 0.5 MG: 0.5 INHALANT RESPIRATORY (INHALATION) at 02:03

## 2025-03-28 RX ADMIN — GUAIFENESIN AND DEXTROMETHORPHAN 10 ML: 100; 10 SYRUP ORAL at 02:03

## 2025-03-28 RX ADMIN — IPRATROPIUM BROMIDE AND ALBUTEROL SULFATE 3 ML: .5; 3 SOLUTION RESPIRATORY (INHALATION) at 11:03

## 2025-03-28 RX ADMIN — ASPIRIN 81 MG: 81 TABLET, COATED ORAL at 10:03

## 2025-03-28 RX ADMIN — IPRATROPIUM BROMIDE AND ALBUTEROL SULFATE 3 ML: .5; 3 SOLUTION RESPIRATORY (INHALATION) at 03:03

## 2025-03-28 RX ADMIN — PANTOPRAZOLE SODIUM 40 MG: 40 TABLET, DELAYED RELEASE ORAL at 12:03

## 2025-03-28 RX ADMIN — METHYLPREDNISOLONE SODIUM SUCCINATE 40 MG: 40 INJECTION, POWDER, FOR SOLUTION INTRAMUSCULAR; INTRAVENOUS at 01:03

## 2025-03-28 RX ADMIN — METHYLPREDNISOLONE SODIUM SUCCINATE 40 MG: 40 INJECTION, POWDER, FOR SOLUTION INTRAMUSCULAR; INTRAVENOUS at 05:03

## 2025-03-28 RX ADMIN — ENOXAPARIN SODIUM 40 MG: 40 INJECTION SUBCUTANEOUS at 05:03

## 2025-03-28 RX ADMIN — GUAIFENESIN 600 MG: 600 TABLET, EXTENDED RELEASE ORAL at 10:03

## 2025-03-28 RX ADMIN — BUDESONIDE 0.5 MG: 0.5 INHALANT RESPIRATORY (INHALATION) at 07:03

## 2025-03-28 NOTE — PLAN OF CARE
Problem: Adult Inpatient Plan of Care  Goal: Plan of Care Review  Outcome: Progressing  Goal: Patient-Specific Goal (Individualized)  Outcome: Progressing  Goal: Absence of Hospital-Acquired Illness or Injury  Outcome: Progressing  Goal: Optimal Comfort and Wellbeing  Outcome: Progressing  Goal: Readiness for Transition of Care  Outcome: Progressing     Problem: COPD (Chronic Obstructive Pulmonary Disease)  Goal: Optimal Chronic Illness Coping  Outcome: Progressing  Goal: Optimal Level of Functional [place]  Outcome: Progressing  Goal: Absence of Infection Signs and Symptoms  Outcome: Progressing  Goal: Improved Oral Intake  Outcome: Progressing  Goal: Effective Oxygenation and Ventilation  Outcome: Progressing     Problem: Infection  Goal: Absence of Infection Signs and Symptoms  Outcome: Progressing

## 2025-03-28 NOTE — PLAN OF CARE
[facility]/Surg  Initial Discharge Assessment       Primary Care Provider: Scar Hussein MD    Admission Diagnosis: COPD exacerbation [J44.1]    Admission Date: 3/27/2025  Expected Discharge Date: 3/30/2025    DC assessment completed with patient at bedside. Verified information on facesheet as correct. Pt lives at listed address with spouse, who also has the flu. Reports she has help if needed from daughter in law. PCP is Dr. Cheung- reports last apt was a few weeks ago. Pharmacy is [facility] in [facility] she has [facility], and just had her 1st visit prior to admission. She doesn't have home Oxygen at this time, she may need it at DC. DME she has a rolling walker and a hydraulic shower chair. Doesn't drive states she is legally blind, family members drive  her to apts. Reports  a family member will provide transportation home upon DC. Reports taking home medications as prescribed and can currently afford them. Verified insurance on file. States was recent;y admitted and states she was in a hurry to get out, but this time thinks that she needs to make sure she is ready before going home.   DC plan is. Home with MARII to [facility].       Transition of Care Barriers: None    Payor: HU[employer] MANAGED MEDICARE / Plan: HU[employer] MEDICARE PPO / Product Type: Medicare Advantage /     Extended Emergency Contact Information  Primary Emergency Contact: Arpit Blake  Address: [address] [place] [address] [place]  [address]           [place] [address] [place]  Home Phone: [phone]  Mobile Phone: [phone]  Relation: Spouse    Discharge Plan A: Home Health  Discharge Plan B: Home with family       [facility] [place], [place] - [address]  [address]  [address] [address]  Phone: [phone] Fax: [phone]    [id number] - [place], [place] - [address] [place] [facility] [place] [address]  [address]  [place]  [address]  Phone: [phone] Fax: [phone]      Initial Assessment (most recent)       Adult Discharge Assessment - 03/28/25 1309          Discharge Assessment    Assessment Type Discharge Planning Assessment     Confirmed/corrected address, phone number and insurance Yes     Confirmed Demographics Correct on Facesheet     Source of Information patient     When was your last doctors appointment? --   a few weeks ago    Reason For Admission COPD     People in Home spouse     Do you expect to return to your current living situation? Yes     Do you have help at home or someone to help you manage your care at home? Yes     Who are your caregiver(s) and their phone number(s)? Spouse Arpit [phone]     Prior to hospitilization cognitive status: Alert/Oriented;No Deficits     Current cognitive status: Alert/Oriented;No Deficits     Walking or Climbing Stairs Difficulty no     Dressing/Bathing Difficulty yes     Dressing/Bathing bathing difficulty, assistance 1 person     Dressing/Bathing Management daughter in law assist with bathing a dressing when needed     Home Accessibility wheelchair accessible     Home Layout Able to live on 1st floor     Equipment Currently Used at Home walker, standard;shower chair;blood pressure machine     Readmission within 30 days? Yes     Patient currently being followed by outpatient case management? No     Do you currently have service(s) that help you manage your care at home? Yes     How Many hours does patient receive services 3     Name and Contact number of agency [facility]     Is the pt/caregiver preference to resume services with current agency Yes     Do you take prescription medications? Yes     Do you have prescription coverage? Yes     Coverage [employer] plan     Do you have any problems affording any of your prescribed medications? No     Is the patient taking medications as prescribed? yes     Who is going to help you get home at discharge? a family member      How do you get to doctors appointments? family or friend will provide     Are you on dialysis? No     Do you take coumadin? No     Discharge Plan A Home Health     Discharge Plan B Home with family     DME Needed Upon Discharge  oxygen   no home oxygen    Discharge Plan discussed with: Patient     Transition of Care Barriers None

## 2025-03-28 NOTE — PLAN OF CARE
03/28/25 1523   Post-Acute Status   Hospital Resources/Appts/Education Provided Appointments scheduled and added to AVS     Pt has a DC clinic appt for 4/3 previously scheduled, Added to AVS

## 2025-03-28 NOTE — PLAN OF CARE
03/28/25 1053   Medicare Message   Important Message from Medicare regarding Discharge Appeal Rights Given to patient/caregiver;Explained to patient/caregiver   Date IMM was signed 03/28/25   Time IMM was signed [id number]

## 2025-03-28 NOTE — PROGRESS NOTES
Pharmacist Renal Dose Adjustment Note    Elissa Raymond is a 69 y.o. female being treated with the medication Levaquin    Patient Data:    Vital Signs (Most Recent):  Temp: 98.2 °F (36.8 °C) (03/27/25 2329)  Pulse: 80 (03/27/25 2344)  Resp: 20 (03/27/25 2329)  BP: (!) 98/55 (03/27/25 2329)  SpO2: 95 % (03/27/25 2329) Vital Signs (72h Range):  Temp:  [98.2 °F (36.8 °C)]   Pulse:  [80-96]   Resp:  [18-25]   BP: ()/(55-87)   SpO2:  [92 %-98 %]      Recent Labs   Lab 03/24/25  0633 03/25/25  0519 03/27/25  1522   CREATININE 1.3 1.2 1.4     Serum creatinine: 1.4 mg/dL 03/27/25 1522  Estimated creatinine clearance: 34.2 mL/min    Medication:Levaquin dose: 750mg frequency q24h will be changed to medication:Levaquin dose:750mg frequency:q48h    Pharmacist's Name: Chaitanya Ayoub  Pharmacist's Extension: [phone]

## 2025-03-28 NOTE — CARE UPDATE
03/28/25 0720   Patient Assessment/Suction   Level of Consciousness (AVPU) alert   Respiratory Effort Normal   Expansion/Accessory Muscles/Retractions expansion symmetric   BINTA Breath Sounds wheezes, expiratory   LLL Breath Sounds diminished   RUL Breath Sounds wheezes, expiratory   RML Breath Sounds wheezes, expiratory   RLL Breath Sounds diminished   Rhythm/Pattern, Respiratory pattern regular   Cough Frequency infrequent   Cough Type good;congested;nonproductive   PRE-TX-O2   Device (Oxygen Therapy) nasal cannula with humidification   $ Is the patient on Low Flow Oxygen? Yes   Flow (L/min) (Oxygen Therapy) 4   SpO2 99 %   Pulse Oximetry Type Intermittent   $ Pulse Oximetry - Multiple Charge Pulse Oximetry - Multiple   Pulse 82   Resp 20   Aerosol Therapy   $ Aerosol Therapy Charges Aerosol Treatment   Daily Review of Necessity (SVN) completed   Respiratory Treatment Status (SVN) given   Treatment Route (SVN) mask   Patient Position semi-Robison's   Signs of Intolerance (SVN) none   Breath Sounds Post-Respiratory Treatment   Throughout All Fields Post-Treatment aeration increased;wheezes, expiratory   Post-treatment Heart Rate (beats/min) 85   Post-treatment Resp Rate (breaths/min) 20

## 2025-03-28 NOTE — CARE UPDATE
Reviewed nocturnist H&P, agree with assessment plan.  Continue to wean patient's oxygen to baseline.  Continue IV steroids, breathing treatments and antibiotics.    Mauro Argueta NP  03/28/2025  11:06 AM

## 2025-03-28 NOTE — ASSESSMENT & PLAN NOTE
-Monitor respiratory status closely  -Contact/respiratory precautions  -PRN antipyretics  -PRN cough medicine

## 2025-03-28 NOTE — HPI
Elissa Raymond is a 69 year old female with a previous medical history of of a TAVR in 2022, A-fib with an loop recorder placed in June 2024, CVA x 2 with no residual deficits and COPD denies home oxygen use who presented to the ED for shortness of breath. Patient recently discharged on 3/25 after being admitted for COPD exacerbation. She has been taking oral steroids at home and reports feeling unwell with shortness of breath, generalized weakness and multiple episodes of syncope for the past week. Her  has the flu and she also tested positive for Flu A. CTA of chest performed in ED and negative for PE or infiltrate but did show incidental pulmonary nodule with recommended outpatient follow-up . Patient requiring oxygen via nasal cannula despite IV magnesium, IV steroids, and multiple breathing treatments. Patient has persistent severe cough but feels as if she is unable to cough up mucous. Noted to have pursed lip breathing. Labs unremarkable. Patient admitted by hospital medicine for further evaluation and management.

## 2025-03-28 NOTE — SUBJECTIVE & OBJECTIVE
Past Medical History:   Diagnosis Date    A-fib     Blind in both eyes     Hyperlipidemia     Hypertension     Stroke     x 3    Tobacco use 11/02/2017    Unspecified macular degeneration     Unspecified macular degeneration        Past Surgical History:   Procedure Laterality Date    ANGIOGRAPHY OF LOWER EXTREMITY N/A 11/01/2023    Procedure: ANGIOGRAM BILATERLA LOWER;  Surgeon: Martin Hair MD;  Location: [facility] CATH/EP LAB;  Service: General;  Laterality: N/A;    AORTIC VALVE REPLACEMENT      AORTOGRAPHY WITH EXTREMITY RUNOFF N/A 11/01/2023    Procedure: AORTOGRAM, WITH EXTREMITY RUNOFF;  Surgeon: Martin Hair MD;  Location: [facility] CATH/EP LAB;  Service: General;  Laterality: N/A;    BLADDER SURGERY      BLADDER SURGERY      Lift.    CATARACT EXTRACTION      CHOLECYSTECTOMY      HYSTERECTOMY      INSERTION OF IMPLANTABLE LOOP RECORDER N/A 6/7/2024    Procedure: Insertion, Implantable Loop Recorder;  Surgeon: Todd Agee MD;  Location: [facility] CATH/EP LAB;  Service: Cardiology;  Laterality: N/A;    KNEE SURGERY Left     TONSILLECTOMY         Review of patient's allergies indicates:   Allergen Reactions    Pcn [penicillins] Other (See Comments)     Unable to obtain       No current facility-administered medications on file prior to encounter.     Current Outpatient Medications on File Prior to Encounter   Medication Sig    albuterol (PROVENTIL/VENTOLIN HFA) 90 mcg/actuation inhaler Inhale 2 puffs into the lungs every 6 (six) hours as needed for Wheezing. Rescue    albuterol-ipratropium (DUO-NEB) 2.5 mg-0.5 mg/3 mL nebulizer solution Inhale 3 mLs into the lungs every 6 (six) hours as needed for Shortness of Breath.    aspirin (ECOTRIN) 81 MG EC tablet Take 1 tablet (81 mg total) by mouth once daily.    azithromycin (Z-RENE) 250 MG tablet Take 2 tablets by mouth on day 1; Take 1 tablet by mouth on days 2-5    famotidine (PEPCID) 20 MG tablet Take 1 tablet (20 mg total) by mouth once daily.    guaiFENesin 100  mg/5 ml (ROBITUSSIN) 100 mg/5 mL syrup Take 20 mLs (400 mg total) by mouth 4 (four) times daily. for 10 days    predniSONE (DELTASONE) 20 MG tablet Take 2 tablets (40 mg total) by mouth once daily. for 6 days    atorvastatin (LIPITOR) 40 MG tablet Take 2 tablets (80 mg total) by mouth every evening. (Patient not taking: Reported on 3/23/2025)    CAPSICUM, CAYENNE, ORAL Take 1 capsule by mouth Daily.    cinnamon bark (CINNAMON) 500 mg capsule Take 500 mg by mouth once daily.    cole root (COLE EXTRACT ORAL) Take 1 capsule by mouth once daily.    OXYGEN-AIR DELIVERY SYSTEMS MISC by [facility].(Non-Drug; Combo Route) route. 3 L by nasal route    vitamin D (VITAMIN D3) 1000 units Tab Take 1,000 Units by mouth once daily.     Family History       Problem Relation (Age of Onset)    Cancer Father    Diabetes Mother    Glaucoma Mother, Sister    Kidney disease Mother    Macular degeneration Mother, Sister, Brother          Tobacco Use    Smoking status: Every Day     Current packs/day: 0.25     Average packs/day: 0.3 packs/day for 55.2 years (13.8 ttl pk-yrs)     Types: Cigarettes     Start date: 1/1/1970    Smokeless tobacco: Never   Substance and Sexual Activity    Alcohol use: Not on file    Drug use: No    Sexual activity: Not on file     Review of Systems   Unable to perform ROS: Acuity of condition   Constitutional:  Positive for fatigue.   Respiratory:  Positive for cough, chest tightness and shortness of breath.    Neurological:  Positive for syncope.     Objective:     Vital Signs (Most Recent):  Temp: 98.2 °F (36.8 °C) (03/27/25 2329)  Pulse: 83 (03/28/25 0236)  Resp: (!) 26 (03/28/25 0236)  BP: 136/63 (03/28/25 0227)  SpO2: 95 % (03/28/25 0236) Vital Signs (24h Range):  Temp:  [98.2 °F (36.8 °C)] 98.2 °F (36.8 °C)  Pulse:  [77-96] 83  Resp:  [18-26] 26  SpO2:  [92 %-98 %] 95 %  BP: ()/(55-87) 136/63     Weight: 66.5 kg (146 lb 9.7 oz)  Body mass index is 25.97 kg/m².     Physical Exam  Vitals reviewed.    HENT:      Head: Normocephalic and atraumatic.      Nose: Nose normal.      Mouth/Throat:      Pharynx: Oropharynx is clear.   Eyes:      Conjunctiva/sclera: Conjunctivae normal.   Cardiovascular:      Rate and Rhythm: Normal rate and regular rhythm.      Pulses: Normal pulses.      Heart sounds: Murmur heard.   Pulmonary:      Effort: Tachypnea and accessory muscle usage present.      Breath sounds: Wheezing present.   Abdominal:      General: Bowel sounds are normal. There is no distension.      Palpations: Abdomen is soft.      Tenderness: There is no abdominal tenderness.   Musculoskeletal:         General: Normal range of motion.      Cervical back: Normal range of motion and neck supple.      Right lower leg: Edema present.      Left lower leg: No edema.   Skin:     General: Skin is warm and dry.      Capillary Refill: Capillary refill takes less than 2 seconds.   Neurological:      General: No focal deficit present.      Mental Status: She is alert and oriented to person, place, and time. Mental status is at baseline.   Psychiatric:         Mood and Affect: Mood normal.         Behavior: Behavior normal.         Thought Content: Thought content normal.         Judgment: Judgment normal.                Significant Labs: All pertinent labs within the past 24 hours have been reviewed.    ABGs:   Recent Labs   Lab 03/27/25 1837 03/27/25 2028 03/28/25  0102   PH 7.377 7.391 7.380   PCO2 49.1* 43.7 42.6   HCO3 28.8* 26.5 25.2   POCSATURATED 33 88 94   BE 4* 2 0   PO2 21* 56 71*     Bilirubin:   Recent Labs   Lab 03/22/25  2242 03/23/25  0523 03/27/25  1522   BILITOT 0.6 0.4 0.7       BMP:   Recent Labs   Lab 03/27/25  1522      K 3.9   CO2 29   BUN 24*   CREATININE 1.4   CALCIUM 9.0     CBC:   Recent Labs   Lab 03/27/25  1522 03/27/25  1837 03/27/25 2028   WBC 11.72  --   --    HGB 15.1  --   --    HCT 46.3 48 42     --   --      CMP:   Recent Labs   Lab 03/27/25  1522      K 3.9   CO2 29    BUN 24*   CREATININE 1.4   CALCIUM 9.0   ALBUMIN 4.0   BILITOT 0.7   ALKPHOS 55   AST 31   ALT 24     Cardiac Markers:   Recent Labs   Lab 03/27/25  1522   BNP 64       Urine Studies:   Recent Labs   Lab 03/28/25  0006   APPEARANCEUA Clear   SPECGRAV 1.020   PROTEINUA 1+*   BILIRUBINUA Negative   UROBILINOGEN Negative   LEUKOCYTESUR Negative   RBCUA 1   WBCUA 3       Significant Imaging: I have reviewed all pertinent imaging results/findings within the past 24 hours.  EXAMINATION:  CTA CHEST NON CORONARY (PE STUDIES)     CLINICAL HISTORY:  Pulmonary embolism (PE) suspected, high prob;hx of possible pe, no eliquis use;     TECHNIQUE:  Low dose axial images, sagittal and coronal reformations were obtained from the thoracic inlet to the lung bases following the IV administration of 100 mL of Omnipaque 350.  Contrast timing was optimized to evaluate the pulmonary arteries.  MIP images were performed.     COMPARISON:  Chest radiograph 03/22/2025, 02/07/2025     FINDINGS:  There is postoperative change of prior TAVR.  The thoracic aorta normal in caliber, contour and course with atherosclerosis.  The heart is not enlarged and there is no evidence of pericardial effusion.There is calcific atherosclerosis of the coronary vessels.  There are multiple normal sized mediastinal lymph nodes.  Hilar contours are within normal limits.  The esophagus maintains normal caliber, contour and course.  Cardiac loop recorder present within the left extra thoracic soft tissues.     The trachea is midline and the proximal airways are patent.  There is a 3 mm right middle lobe pulmonary nodule.  No pleural fluid, focal consolidation or pneumothorax.     Allowing for artifact from dense contrast bolus in the SVC there are no filling defects within the pulmonary arteries to suggest pulmonary artery thromboembolism through the segmental levels.     Visualized structures of the upper abdomen demonstrate atherosclerosis of the abdominal aorta.   There is postoperative change of cholecystectomy.  The visualized osseous structures are intact with degenerative change.     Impression:     1. No evidence of pulmonary artery thromboembolism through the segmental levels.  No acute intrathoracic abnormality.  2. Postoperative change of prior TAVR.  Cardiac loop recorder present within the left extra thoracic soft tissues.  3. Right middle lobe 3 mm pulmonary nodule.  For a solid nodule <6 mm, Fleischner Society 2017 guidelines recommend no routine follow up for a low risk patient, or follow-up with non-contrast chest CT at 12 months in a high risk patient.        Electronically signed by:Michelle Johnson MD  Date:                                            03/23/2025  Time:                                           01:16    EXAMINATION:  XR CHEST 1 VIEW     CLINICAL HISTORY:  Dyspnea, unspecified     TECHNIQUE:  Single frontal view of the chest was performed.     COMPARISON:  03/27/2025     FINDINGS:  The cardiomediastinal silhouette is not enlarged noting prior surgical change.  There is calcification of the aorta.  Left chest wall recording device noted..  There is no pleural effusion.  The trachea is midline.  The lungs are symmetrically expanded bilaterally with coarse interstitial attenuation, accentuated by habitus..  No large focal consolidation seen.  There is no pneumothorax.  The osseous structures are remarkable for degenerative change.  There is remote appearing left rib injury..     Impression:     1. Interstitial findings are accentuated by habitus, no large focal consolidation.  Superimposed edema is a consideration although correlation is needed.        Electronically signed by:Cosme Kelley MD  Date:                                            03/27/2025  Time:                                           20:08

## 2025-03-28 NOTE — ED PROVIDER NOTES
Encounter Date: 3/27/2025       History     Chief Complaint   Patient presents with    Shortness of Breath     69-year-old female presents for evaluation of worsening shortness a breath.  Multiple comorbidities not on anticoagulation was recently discharge.  CT imaging while inpatient demonstrated no evidence of PE.  Patient denies any infectious symptoms.    The history is provided by the patient.     Review of patient's allergies indicates:   Allergen Reactions    Pcn [penicillins] Other (See Comments)     Unable to obtain     Past Medical History:   Diagnosis Date    A-fib     Blind in both eyes     Hyperlipidemia     Hypertension     Stroke     x 3    Tobacco use 11/02/2017    Unspecified macular degeneration     Unspecified macular degeneration      Past Surgical History:   Procedure Laterality Date    ANGIOGRAPHY OF LOWER EXTREMITY N/A 11/01/2023    Procedure: ANGIOGRAM BILATERLA LOWER;  Surgeon: Martin Hair MD;  Location: [facility] CATH/EP LAB;  Service: General;  Laterality: N/A;    AORTIC VALVE REPLACEMENT      AORTOGRAPHY WITH EXTREMITY RUNOFF N/A 11/01/2023    Procedure: AORTOGRAM, WITH EXTREMITY RUNOFF;  Surgeon: Martin Hair MD;  Location: [facility] CATH/EP LAB;  Service: General;  Laterality: N/A;    BLADDER SURGERY      BLADDER SURGERY      Lift.    CATARACT EXTRACTION      CHOLECYSTECTOMY      HYSTERECTOMY      INSERTION OF IMPLANTABLE LOOP RECORDER N/A 6/7/2024    Procedure: Insertion, Implantable Loop Recorder;  Surgeon: Todd Agee MD;  Location: [facility] CATH/EP LAB;  Service: Cardiology;  Laterality: N/A;    KNEE SURGERY Left     TONSILLECTOMY       Family History   Problem Relation Name Age of Onset    Diabetes Mother      Kidney disease Mother      Glaucoma Mother      Macular degeneration Mother      Cancer Father      Glaucoma Sister      Macular degeneration Sister      Macular degeneration Brother       Social History[1]  Review of Systems   All other systems reviewed and are  negative.      Physical Exam     Initial Vitals [03/27/25 1207]   BP Pulse Resp Temp SpO2   (!) 161/84 91 20 98.2 °F (36.8 °C) (!) 92 %      MAP       --         Physical Exam    Nursing note and vitals reviewed.  Constitutional: She appears distressed.   HENT:   Head: Normocephalic.   Eyes: No scleral icterus.   Cardiovascular:  Normal rate.           Pulmonary/Chest: No stridor. She is in respiratory distress.   Wheezing bilaterally, tachypnea, accessory muscle usage   Abdominal: There is no guarding.     Neurological: She is alert.   Skin: No rash noted. No erythema.         ED Course   Critical Care    Date/Time: 3/27/2025 8:13 PM    Performed by: Russell Parker Jr., DO  Authorized by: Russell Parker Jr., DO  Direct patient critical care time: 10 minutes  Additional history critical care time: 10 minutes  Ordering / reviewing critical care time: 10 minutes  Documentation critical care time: 10 minutes  Total critical care time (exclusive of procedural time) : 40 minutes        Labs Reviewed   COMPREHENSIVE METABOLIC PANEL - Abnormal       Result Value    Sodium 138      Potassium 3.9      Chloride 101      CO2 29      Glucose 86      BUN 24 (*)     Creatinine 1.4      Calcium 9.0      Protein Total 6.7      Albumin 4.0      Bilirubin Total 0.7      ALP 55      AST 31      ALT 24      Anion Gap 8      eGFR 41 (*)    CBC WITH DIFFERENTIAL - Abnormal    WBC 11.72      RBC 4.99      Hgb 15.1      Hct 46.3      MCV 93      MCH 30.3      MCHC 32.6      RDW 14.2      Platelet Count 151      MPV 12.0      Nucleated RBC 0      Neut % 75.1 (*)     Lymph % 14.7 (*)     Mono % 7.3      Eos % 1.3      Basophil % 0.2      Imm Grans % 1.4 (*)     Neut # 8.8 (*)     Lymph # 1.72      Mono # 0.86      Eos # 0.15      Baso # 0.02      Imm Grans # 0.16 (*)    TROPONIN I HIGH SENSITIVITY - Abnormal    Troponin High Sensitive 18.0 (*)    ISTAT PROCEDURE - Abnormal    POC PH 7.377      POC PCO2 49.1 (*)     POC PO2 21 (*)      POC HCO3 28.8 (*)     POC BE 4 (*)     POC SATURATED O2 33      POC Glucose 84      POC Sodium 137      POC Potassium 4.0      POC TCO2 30 (*)     POC Ionized Calcium 1.12      POC Hematocrit 48      Sample VENOUS      Site Other      Allens Test N/A      DelSys Nasal Can      Mode SPONT      Flow 4     LACTIC ACID, PLASMA - Normal    Lactic Acid Level 0.8      Narrative:     Falsely low lactic acid results can be found in samples containing >=13.0 mg/dL total bilirubin and/or >=3.5 mg/dL direct bilirubin.    B-TYPE NATRIURETIC PEPTIDE - Normal    BNP 64     TROPONIN I HIGH SENSITIVITY - Normal    Troponin High Sensitive 14.7     CULTURE, BLOOD   CULTURE, BLOOD   INFLUENZA A & B BY MOLECULAR   CBC W/ AUTO DIFFERENTIAL    Narrative:     The following orders were created for panel order CBC auto differential.  Procedure                               Abnormality         Status                     ---------                               -----------         ------                     CBC with Differential[[id number]]       Abnormal            Final result                 Please view results for these tests on the individual orders.   HEPATITIS C ANTIBODY   HIV 1 / 2 ANTIBODY   URINALYSIS, REFLEX TO URINE CULTURE   SARS-COV-2 RNA AMPLIFICATION, QUAL   RSV ANTIGEN DETECTION   ISTAT PROCEDURE    POC PH 7.391      POC PCO2 43.7      POC PO2 56      POC HCO3 26.5      POC BE 2      POC SATURATED O2 88      POC Glucose 100      POC Sodium 136      POC Potassium 3.9      POC TCO2 28      POC Ionized Calcium 1.12      POC Hematocrit 42      Sample VENOUS      Site Other      Allens Test N/A      DelSys Nasal Can      Mode SPONT      Flow 3          ECG Results              EKG 12-lead (Final result)        Collection Time Result Time QRS Duration OHS QTC Calculation    03/27/25 12:07:12 03/27/25 19:40:33 66 466                     Final result by Interface, Lab In [facility] (03/27/25 19:40:40)                   Narrative:    Test  Reason : Z13.6,    Vent. Rate :  86 BPM     Atrial Rate :  86 BPM     P-R Int : 124 ms          QRS Dur :  66 ms      QT Int : 390 ms       P-R-T Axes :  76  72  78 degrees    QTcB Int : 466 ms    Normal sinus rhythm  Normal ECG  Confirmed by Steff Álvarez ([id number]) on 3/27/2025 7:40:29 PM    Referred By: AAAREFERRAL SELF           Confirmed By: Steff Álvarez                                  Imaging Results              X-Ray Chest 1 View (Final result)  Result time 03/27/25 20:08:29      Final result by Cosme Kelley MD (03/27/25 20:08:29)                   Impression:      1. Interstitial findings are accentuated by habitus, no large focal consolidation.  Superimposed edema is a consideration although correlation is needed.      Electronically signed by: Cosme Kelley MD  Date:    03/27/2025  Time:    20:08               Narrative:    EXAMINATION:  XR CHEST 1 VIEW    CLINICAL HISTORY:  Dyspnea, unspecified    TECHNIQUE:  Single frontal view of the chest was performed.    COMPARISON:  03/27/2025    FINDINGS:  The cardiomediastinal silhouette is not enlarged noting prior surgical change.  There is calcification of the aorta.  Left chest wall recording device noted..  There is no pleural effusion.  The trachea is midline.  The lungs are symmetrically expanded bilaterally with coarse interstitial attenuation, accentuated by habitus..  No large focal consolidation seen.  There is no pneumothorax.  The osseous structures are remarkable for degenerative change.  There is remote appearing left rib injury..                                       Medications   levoFLOXacin 750 mg/150 mL IVPB 750 mg (750 mg Intravenous New Bag 3/27/25 2030)   albuterol-ipratropium 2.5 mg-0.5 mg/3 mL nebulizer solution 3 mL (has no administration in time range)   albuterol-ipratropium 2.5 mg-0.5 mg/3 mL nebulizer solution 9 mL (9 mLs Nebulization Given 3/27/25 1837)   methylPREDNISolone sodium succinate injection 125 mg  (125 mg Intravenous Given 3/27/25 1912)   nitroGLYCERIN 2% TD oint ointment 0.5 inch (0.5 inches Topical (Top) Given 3/27/25 1915)   magnesium sulfate 2g in water 50mL IVPB (premix) (0 g Intravenous Stopped 3/27/25 2030)   labetaloL injection 5 mg (5 mg Intravenous Given by Other 3/27/25 1940)     Medical Decision Making  69-year-old female presents for evaluation of respiratory distress.  Significant accessory muscle usage and wheezing and hypertensive.  Patient administered IV labetalol with topical nitroglycerin, blood pressure 192/87 and improved to systolic 135.  Patient administered 3 DuoNebs, IV steroids and magnesium and IV antibiotics.  No large infiltrate on chest x-ray.  Initial troponin elevated repeat troponin improved.  Patient does not have access to breathing treatment equipment at home.  Feel patient would benefit from continued supportive care blood pressure control.  We will admit to hospitalist team for further management evaluation.    -repeat VBG improving    Amount and/or Complexity of Data Reviewed  External Data Reviewed: radiology and notes.     Details: No PE  Labs: ordered. Decision-making details documented in ED Course.  Radiology: ordered and independent interpretation performed.     Details: No definitive focal infiltrate  ECG/medicine tests: ordered and independent interpretation performed.     Details: EKG rate 86, normal sinus rhythm, QRS 66, , no STEMI  Discussion of management or test interpretation with external provider(s): Spoke with Camden Bueno NP with hospitalist team will admit for further management and evaluation      Risk  Prescription drug management.  Decision regarding hospitalization.               ED Course as of 03/27/25 2050   Thu Mar 27, 2025   1915 Hyperinflated lungs on chest x-ray per my interpretation Russell Parker DO [KB]   2004 POC PH: 7.377 [KB]   2004 POC PCO2(!): 49.1 [KB]   2004 Troponin I High Sensitivity: 14.7 [KB]   2004 BNP: 64 [KB]    2004 Lactic Acid Level: 0.8 [KB]   2004 WBC: 11.72 [KB]   2004 Hemoglobin: 15.1 [KB]      ED Course User Index  [KB] Russell Parker Jr., DO                           Clinical Impression:  Final diagnoses:  [Z13.6] Screening for cardiovascular condition  [R06.00] Dyspnea  [J44.1] COPD exacerbation (Primary)  [I16.0] Hypertensive urgency          ED Disposition Condition    Admit                     [1]   Social History  Tobacco Use    Smoking status: Every Day     Current packs/day: 0.25     Average packs/day: 0.3 packs/day for 55.2 years (13.8 ttl pk-yrs)     Types: Cigarettes     Start date: 1/1/1970    Smokeless tobacco: Never   Substance Use Topics    Drug use: No        Russell Parker Jr., DO  03/27/25 2050

## 2025-03-28 NOTE — ASSESSMENT & PLAN NOTE
Patient with Hypoxic Respiratory failure which is Acute on chronic.  she is on home oxygen at 4 LPM. Supplemental oxygen was provided and noted-      .   Signs/symptoms of respiratory failure include- tachypnea, increased work of breathing, use of accessory muscles, and wheezing. Contributing diagnoses includes - COPD and Influenza A Labs and images were reviewed. Patient Has recent ABG, which has been reviewed. Will treat underlying causes and adjust management of respiratory failure as follows- IV antibiotics, IV steroids, Scheduled and PRN breathing treatments, Continuous oxygen

## 2025-03-28 NOTE — H&P
[facility] Medicine  History & Physical    Patient Name: Elissa Raymond  MRN: [id number]  Patient Class: IP- Inpatient  Admission Date: 3/27/2025  Attending Physician: Dvaion Trevizo MD   Primary Care Provider: Scar Hussein MD         Patient information was obtained from patient, past medical records, and ER records.     Subjective:     Principal Problem:COPD exacerbation    Chief Complaint:   Chief Complaint   Patient presents with    Shortness of Breath        HPI: Elissa Raymond is a 69 year old female with a previous medical history of of a TAVR in 2022, A-fib with an loop recorder placed in June 2024, CVA x 2 with no residual deficits and COPD denies home oxygen use who presented to the ED for shortness of breath. Patient recently discharged on 3/25 after being admitted for COPD exacerbation. She has been taking oral steroids at home and reports feeling unwell with shortness of breath, generalized weakness and multiple episodes of syncope for the past week. Her  has the flu and she also tested positive for Flu A. CTA of chest performed in ED and negative for PE or infiltrate but did show incidental pulmonary nodule with recommended outpatient follow-up . Patient requiring oxygen via nasal cannula despite IV magnesium, IV steroids, and multiple breathing treatments. Patient has persistent severe cough but feels as if she is unable to cough up mucous. Noted to have pursed lip breathing. Labs unremarkable. EKG showed NSR. Patient admitted by hospital medicine for further evaluation and management.     Past Medical History:   Diagnosis Date    A-fib     Blind in both eyes     Hyperlipidemia     Hypertension     Stroke     x 3    Tobacco use 11/02/2017    Unspecified macular degeneration     Unspecified macular degeneration        Past Surgical History:   Procedure Laterality Date    ANGIOGRAPHY OF LOWER EXTREMITY N/A 11/01/2023    Procedure: ANGIOGRAM BILATERLA LOWER;   Surgeon: Martin Hair MD;  Location: [facility] CATH/EP LAB;  Service: General;  Laterality: N/A;    AORTIC VALVE REPLACEMENT      AORTOGRAPHY WITH EXTREMITY RUNOFF N/A 11/01/2023    Procedure: AORTOGRAM, WITH EXTREMITY RUNOFF;  Surgeon: Martin Hair MD;  Location: [facility] CATH/EP LAB;  Service: General;  Laterality: N/A;    BLADDER SURGERY      BLADDER SURGERY      Lift.    CATARACT EXTRACTION      CHOLECYSTECTOMY      HYSTERECTOMY      INSERTION OF IMPLANTABLE LOOP RECORDER N/A 6/7/2024    Procedure: Insertion, Implantable Loop Recorder;  Surgeon: Todd Agee MD;  Location: [facility] CATH/EP LAB;  Service: Cardiology;  Laterality: N/A;    KNEE SURGERY Left     TONSILLECTOMY         Review of patient's allergies indicates:   Allergen Reactions    Pcn [penicillins] Other (See Comments)     Unable to obtain       No current facility-administered medications on file prior to encounter.     Current Outpatient Medications on File Prior to Encounter   Medication Sig    albuterol (PROVENTIL/VENTOLIN HFA) 90 mcg/actuation inhaler Inhale 2 puffs into the lungs every 6 (six) hours as needed for Wheezing. Rescue    albuterol-ipratropium (DUO-NEB) 2.5 mg-0.5 mg/3 mL nebulizer solution Inhale 3 mLs into the lungs every 6 (six) hours as needed for Shortness of Breath.    aspirin (ECOTRIN) 81 MG EC tablet Take 1 tablet (81 mg total) by mouth once daily.    azithromycin (Z-RENE) 250 MG tablet Take 2 tablets by mouth on day 1; Take 1 tablet by mouth on days 2-5    famotidine (PEPCID) 20 MG tablet Take 1 tablet (20 mg total) by mouth once daily.    guaiFENesin 100 mg/5 ml (ROBITUSSIN) 100 mg/5 mL syrup Take 20 mLs (400 mg total) by mouth 4 (four) times daily. for 10 days    predniSONE (DELTASONE) 20 MG tablet Take 2 tablets (40 mg total) by mouth once daily. for 6 days    atorvastatin (LIPITOR) 40 MG tablet Take 2 tablets (80 mg total) by mouth every evening. (Patient not taking: Reported on 3/23/2025)    CAPSICUM, CAYENNE, ORAL  Take 1 capsule by mouth Daily.    cinnamon bark (CINNAMON) 500 mg capsule Take 500 mg by mouth once daily.    cole root (COLE EXTRACT ORAL) Take 1 capsule by mouth once daily.    OXYGEN-AIR DELIVERY SYSTEMS MISC by [facility].(Non-Drug; Combo Route) route. 3 L by nasal route    vitamin D (VITAMIN D3) 1000 units Tab Take 1,000 Units by mouth once daily.     Family History       Problem Relation (Age of Onset)    Cancer Father    Diabetes Mother    Glaucoma Mother, Sister    Kidney disease Mother    Macular degeneration Mother, Sister, Brother          Tobacco Use    Smoking status: Every Day     Current packs/day: 0.25     Average packs/day: 0.3 packs/day for 55.2 years (13.8 ttl pk-yrs)     Types: Cigarettes     Start date: 1/1/1970    Smokeless tobacco: Never   Substance and Sexual Activity    Alcohol use: Not on file    Drug use: No    Sexual activity: Not on file     Review of Systems   Unable to perform ROS: Acuity of condition   Constitutional:  Positive for fatigue.   Respiratory:  Positive for cough, chest tightness and shortness of breath.    Neurological:  Positive for syncope.     Objective:     Vital Signs (Most Recent):  Temp: 98.2 °F (36.8 °C) (03/27/25 2329)  Pulse: 83 (03/28/25 0236)  Resp: (!) 26 (03/28/25 0236)  BP: 136/63 (03/28/25 0227)  SpO2: 95 % (03/28/25 0236) Vital Signs (24h Range):  Temp:  [98.2 °F (36.8 °C)] 98.2 °F (36.8 °C)  Pulse:  [77-96] 83  Resp:  [18-26] 26  SpO2:  [92 %-98 %] 95 %  BP: ()/(55-87) 136/63     Weight: 66.5 kg (146 lb 9.7 oz)  Body mass index is 25.97 kg/m².     Physical Exam  Vitals reviewed.   HENT:      Head: Normocephalic and atraumatic.      Nose: Nose normal.      Mouth/Throat:      Pharynx: Oropharynx is clear.   Eyes:      Conjunctiva/sclera: Conjunctivae normal.   Cardiovascular:      Rate and Rhythm: Normal rate and regular rhythm.      Pulses: Normal pulses.      Heart sounds: Murmur heard.   Pulmonary:      Effort: Tachypnea and accessory muscle usage  present.      Breath sounds: Wheezing present.   Abdominal:      General: Bowel sounds are normal. There is no distension.      Palpations: Abdomen is soft.      Tenderness: There is no abdominal tenderness.   Musculoskeletal:         General: Normal range of motion.      Cervical back: Normal range of motion and neck supple.      Right lower leg: Edema present.      Left lower leg: No edema.   Skin:     General: Skin is warm and dry.      Capillary Refill: Capillary refill takes less than 2 seconds.   Neurological:      General: No focal deficit present.      Mental Status: She is alert and oriented to person, place, and time. Mental status is at baseline.   Psychiatric:         Mood and Affect: Mood normal.         Behavior: Behavior normal.         Thought Content: Thought content normal.         Judgment: Judgment normal.                Significant Labs: All pertinent labs within the past 24 hours have been reviewed.    ABGs:   Recent Labs   Lab 03/27/25 1837 03/27/25 2028 03/28/25  0102   PH 7.377 7.391 7.380   PCO2 49.1* 43.7 42.6   HCO3 28.8* 26.5 25.2   POCSATURATED 33 88 94   BE 4* 2 0   PO2 21* 56 71*     Bilirubin:   Recent Labs   Lab 03/22/25  2242 03/23/25  0523 03/27/25  1522   BILITOT 0.6 0.4 0.7       BMP:   Recent Labs   Lab 03/27/25  1522      K 3.9   CO2 29   BUN 24*   CREATININE 1.4   CALCIUM 9.0     CBC:   Recent Labs   Lab 03/27/25  1522 03/27/25  1837 03/27/25 2028   WBC 11.72  --   --    HGB 15.1  --   --    HCT 46.3 48 42     --   --      CMP:   Recent Labs   Lab 03/27/25  1522      K 3.9   CO2 29   BUN 24*   CREATININE 1.4   CALCIUM 9.0   ALBUMIN 4.0   BILITOT 0.7   ALKPHOS 55   AST 31   ALT 24     Cardiac Markers:   Recent Labs   Lab 03/27/25  1522   BNP 64       Urine Studies:   Recent Labs   Lab 03/28/25  0006   APPEARANCEUA Clear   SPECGRAV 1.020   PROTEINUA 1+*   BILIRUBINUA Negative   UROBILINOGEN Negative   LEUKOCYTESUR Negative   RBCUA 1   WBCUA 3        Significant Imaging: I have reviewed all pertinent imaging results/findings within the past 24 hours.  EXAMINATION:  CTA CHEST NON CORONARY (PE STUDIES)     CLINICAL HISTORY:  Pulmonary embolism (PE) suspected, high prob;hx of possible pe, no eliquis use;     TECHNIQUE:  Low dose axial images, sagittal and coronal reformations were obtained from the thoracic inlet to the lung bases following the IV administration of 100 mL of Omnipaque 350.  Contrast timing was optimized to evaluate the pulmonary arteries.  MIP images were performed.     COMPARISON:  Chest radiograph 03/22/2025, 02/07/2025     FINDINGS:  There is postoperative change of prior TAVR.  The thoracic aorta normal in caliber, contour and course with atherosclerosis.  The heart is not enlarged and there is no evidence of pericardial effusion.There is calcific atherosclerosis of the coronary vessels.  There are multiple normal sized mediastinal lymph nodes.  Hilar contours are within normal limits.  The esophagus maintains normal caliber, contour and course.  Cardiac loop recorder present within the left extra thoracic soft tissues.     The trachea is midline and the proximal airways are patent.  There is a 3 mm right middle lobe pulmonary nodule.  No pleural fluid, focal consolidation or pneumothorax.     Allowing for artifact from dense contrast bolus in the SVC there are no filling defects within the pulmonary arteries to suggest pulmonary artery thromboembolism through the segmental levels.     Visualized structures of the upper abdomen demonstrate atherosclerosis of the abdominal aorta.  There is postoperative change of cholecystectomy.  The visualized osseous structures are intact with degenerative change.     Impression:     1. No evidence of pulmonary artery thromboembolism through the segmental levels.  No acute intrathoracic abnormality.  2. Postoperative change of prior TAVR.  Cardiac loop recorder present within the left extra thoracic  soft tissues.  3. Right middle lobe 3 mm pulmonary nodule.  For a solid nodule <6 mm, Fleischner Society 2017 guidelines recommend no routine follow up for a low risk patient, or follow-up with non-contrast chest CT at 12 months in a high risk patient.        Electronically signed by:Michelle Johnson MD  Date:                                            03/23/2025  Time:                                           01:16    EXAMINATION:  XR CHEST 1 VIEW     CLINICAL HISTORY:  Dyspnea, unspecified     TECHNIQUE:  Single frontal view of the chest was performed.     COMPARISON:  03/27/2025     FINDINGS:  The cardiomediastinal silhouette is not enlarged noting prior surgical change.  There is calcification of the aorta.  Left chest wall recording device noted..  There is no pleural effusion.  The trachea is midline.  The lungs are symmetrically expanded bilaterally with coarse interstitial attenuation, accentuated by habitus..  No large focal consolidation seen.  There is no pneumothorax.  The osseous structures are remarkable for degenerative change.  There is remote appearing left rib injury..     Impression:     1. Interstitial findings are accentuated by habitus, no large focal consolidation.  Superimposed edema is a consideration although correlation is needed.        Electronically signed by:Cosme Kelley MD  Date:                                            03/27/2025  Time:                                           20:08  Assessment/Plan:     Assessment & Plan  COPD exacerbation  Patient's COPD is with exacerbation noted by continued dyspnea, use of accessory muscles for breathing, and worsening of baseline hypoxia currently.  Patient is currently on COPD Pathway. Continue scheduled inhalers Steroids, Antibiotics, and Supplemental oxygen and monitor respiratory status closely.   Acute hypoxemic respiratory failure  Patient with Hypoxic Respiratory failure which is Acute on chronic.  she is on home oxygen at 4 LPM.  Supplemental oxygen was provided and noted-      .   Signs/symptoms of respiratory failure include- tachypnea, increased work of breathing, use of accessory muscles, and wheezing. Contributing diagnoses includes - COPD and Influenza A  Labs and images were reviewed. Patient Has recent ABG, which has been reviewed. Will treat underlying causes and adjust management of respiratory failure as follows- IV antibiotics, IV steroids, Scheduled and PRN breathing treatments, Continuous oxygen  Influenza A  -Monitor respiratory status closely  -Contact/respiratory precautions  -PRN antipyretics  -PRN cough medicine      VTE Risk Mitigation (From admission, onward)           Ordered     enoxaparin injection 40 mg  Daily         03/28/25 0042     IP VTE HIGH RISK PATIENT  Once         03/28/25 0042     Place sequential compression device  Until discontinued         03/28/25 0042                               Pharmacist Renal Dose Adjustment Note    Elissa Raymond is a 69 y.o. female being treated with the medication Levaquin    Patient Data:    Vital Signs (Most Recent):  Temp: 98.2 °F (36.8 °C) (03/27/25 2329)  Pulse: 80 (03/27/25 2344)  Resp: 20 (03/27/25 2329)  BP: (!) 98/55 (03/27/25 2329)  SpO2: 95 % (03/27/25 2329) Vital Signs (72h Range):  Temp:  [98.2 °F (36.8 °C)]   Pulse:  [80-96]   Resp:  [18-25]   BP: ()/(55-87)   SpO2:  [92 %-98 %]      Recent Labs   Lab 03/24/25  0633 03/25/25  0519 03/27/25  1522   CREATININE 1.3 1.2 1.4     Serum creatinine: 1.4 mg/dL 03/27/25 1522  Estimated creatinine clearance: 34.2 mL/min    Medication:Levaquin dose: 750mg frequency q24h will be changed to medication:Levaquin dose:750mg frequency:q48h    Pharmacist's Name: Chaitanya Ayoub  Pharmacist's Extension: [phone]      Stephany Nye NP  Department of Hospital Medicine  [facility]/Surg

## 2025-03-29 LAB
ANION GAP (SMH): 7 MMOL/L (ref 8–16)
BUN SERPL-MCNC: 33 MG/DL (ref 8–23)
CALCIUM SERPL-MCNC: 8.5 MG/DL (ref 8.7–10.5)
CHLORIDE SERPL-SCNC: 105 MMOL/L (ref 95–110)
CO2 SERPL-SCNC: 23 MMOL/L (ref 23–29)
CREAT SERPL-MCNC: 1.3 MG/DL (ref 0.5–1.4)
CRP SERPL-MCNC: 41.9 MG/L
ERYTHROCYTE [DISTWIDTH] IN BLOOD BY AUTOMATED COUNT: 13.6 % (ref 11.5–14.5)
GFR SERPLBLD CREATININE-BSD FMLA CKD-EPI: 44 ML/MIN/1.73/M2
GLUCOSE SERPL-MCNC: 197 MG/DL (ref 70–110)
HCT VFR BLD AUTO: 39 % (ref 37–48.5)
HGB BLD-MCNC: 12.5 GM/DL (ref 12–16)
LYMPHOCYTES NFR BLD MANUAL: 8 % (ref 18–48)
MAGNESIUM SERPL-MCNC: 2.7 MG/DL (ref 1.6–2.6)
MCH RBC QN AUTO: 30.2 PG (ref 27–31)
MCHC RBC AUTO-ENTMCNC: 32.1 G/DL (ref 32–36)
MCV RBC AUTO: 94 FL (ref 82–98)
MONOCYTES NFR BLD MANUAL: 3 % (ref 4–15)
NEUTROPHILS NFR BLD MANUAL: 89 % (ref 38–73)
NUCLEATED RBC (/100WBC) (SMH): 0 /100 WBC
PHOSPHATE SERPL-MCNC: 3.4 MG/DL (ref 2.7–4.5)
PLATELET # BLD AUTO: 132 K/UL (ref 150–450)
PMV BLD AUTO: 12.5 FL (ref 9.2–12.9)
POTASSIUM SERPL-SCNC: 4.4 MMOL/L (ref 3.5–5.1)
PROCALCITONIN SERPL-MCNC: 0.16 NG/ML
RBC # BLD AUTO: 4.14 M/UL (ref 4–5.4)
SODIUM SERPL-SCNC: 135 MMOL/L (ref 136–145)
WBC # BLD AUTO: 23.19 K/UL (ref 3.9–12.7)

## 2025-03-29 PROCEDURE — 94761 N-INVAS EAR/PLS OXIMETRY MLT: CPT

## 2025-03-29 PROCEDURE — 84145 PROCALCITONIN (PCT): CPT | Performed by: STUDENT IN AN ORGANIZED HEALTH CARE EDUCATION/TRAINING PROGRAM

## 2025-03-29 PROCEDURE — 63600175 PHARM REV CODE 636 W HCPCS: Mod: JZ,TB

## 2025-03-29 PROCEDURE — 85025 COMPLETE CBC W/AUTO DIFF WBC: CPT

## 2025-03-29 PROCEDURE — 25000242 PHARM REV CODE 250 ALT 637 W/ HCPCS: Performed by: INTERNAL MEDICINE

## 2025-03-29 PROCEDURE — 83735 ASSAY OF MAGNESIUM: CPT

## 2025-03-29 PROCEDURE — 27000207 HC ISOLATION

## 2025-03-29 PROCEDURE — 27000221 HC OXYGEN, UP TO 24 HOURS

## 2025-03-29 PROCEDURE — 25000242 PHARM REV CODE 250 ALT 637 W/ HCPCS

## 2025-03-29 PROCEDURE — 36415 COLL VENOUS BLD VENIPUNCTURE: CPT

## 2025-03-29 PROCEDURE — 94640 AIRWAY INHALATION TREATMENT: CPT

## 2025-03-29 PROCEDURE — 82374 ASSAY BLOOD CARBON DIOXIDE: CPT

## 2025-03-29 PROCEDURE — 84100 ASSAY OF PHOSPHORUS: CPT

## 2025-03-29 PROCEDURE — 11000001 HC ACUTE MED/SURG PRIVATE ROOM

## 2025-03-29 PROCEDURE — 36415 COLL VENOUS BLD VENIPUNCTURE: CPT | Performed by: STUDENT IN AN ORGANIZED HEALTH CARE EDUCATION/TRAINING PROGRAM

## 2025-03-29 PROCEDURE — 25000003 PHARM REV CODE 250

## 2025-03-29 PROCEDURE — 86140 C-REACTIVE PROTEIN: CPT | Performed by: STUDENT IN AN ORGANIZED HEALTH CARE EDUCATION/TRAINING PROGRAM

## 2025-03-29 RX ADMIN — IPRATROPIUM BROMIDE AND ALBUTEROL SULFATE 3 ML: .5; 3 SOLUTION RESPIRATORY (INHALATION) at 03:03

## 2025-03-29 RX ADMIN — BUDESONIDE 0.5 MG: 0.5 INHALANT RESPIRATORY (INHALATION) at 07:03

## 2025-03-29 RX ADMIN — GUAIFENESIN 600 MG: 600 TABLET, EXTENDED RELEASE ORAL at 08:03

## 2025-03-29 RX ADMIN — IPRATROPIUM BROMIDE AND ALBUTEROL SULFATE 3 ML: .5; 3 SOLUTION RESPIRATORY (INHALATION) at 12:03

## 2025-03-29 RX ADMIN — METHYLPREDNISOLONE SODIUM SUCCINATE 40 MG: 40 INJECTION, POWDER, FOR SOLUTION INTRAMUSCULAR; INTRAVENOUS at 05:03

## 2025-03-29 RX ADMIN — LEVOFLOXACIN 750 MG: 750 INJECTION, SOLUTION INTRAVENOUS at 08:03

## 2025-03-29 RX ADMIN — IPRATROPIUM BROMIDE AND ALBUTEROL SULFATE 3 ML: .5; 3 SOLUTION RESPIRATORY (INHALATION) at 11:03

## 2025-03-29 RX ADMIN — METHYLPREDNISOLONE SODIUM SUCCINATE 40 MG: 40 INJECTION, POWDER, FOR SOLUTION INTRAMUSCULAR; INTRAVENOUS at 01:03

## 2025-03-29 RX ADMIN — IPRATROPIUM BROMIDE AND ALBUTEROL SULFATE 3 ML: .5; 3 SOLUTION RESPIRATORY (INHALATION) at 07:03

## 2025-03-29 RX ADMIN — GUAIFENESIN 600 MG: 600 TABLET, EXTENDED RELEASE ORAL at 09:03

## 2025-03-29 RX ADMIN — METHYLPREDNISOLONE SODIUM SUCCINATE 40 MG: 40 INJECTION, POWDER, FOR SOLUTION INTRAMUSCULAR; INTRAVENOUS at 10:03

## 2025-03-29 RX ADMIN — IPRATROPIUM BROMIDE AND ALBUTEROL SULFATE 3 ML: .5; 3 SOLUTION RESPIRATORY (INHALATION) at 06:03

## 2025-03-29 RX ADMIN — ENOXAPARIN SODIUM 40 MG: 40 INJECTION SUBCUTANEOUS at 05:03

## 2025-03-29 RX ADMIN — ASPIRIN 81 MG: 81 TABLET, COATED ORAL at 09:03

## 2025-03-29 RX ADMIN — PANTOPRAZOLE SODIUM 40 MG: 40 TABLET, DELAYED RELEASE ORAL at 09:03

## 2025-03-29 NOTE — PROGRESS NOTES
[facility] Medicine  Progress Note    Patient Name: Elissa Raymond  MRN: [id number]  Patient Class: IP- Inpatient   Admission Date: 3/27/2025  Length of Stay: 2 days  Attending Physician: Jaleesa Marsh MD  Primary Care Provider: Scar Hussein MD        Subjective     Principal Problem:COPD exacerbation        HPI:  Elissa Raymond is a 69 year old female with a previous medical history of of a TAVR in 2022, A-fib with an loop recorder placed in June 2024, CVA x 2 with no residual deficits and COPD denies home oxygen use who presented to the ED for shortness of breath. Patient recently discharged on 3/25 after being admitted for COPD exacerbation. She has been taking oral steroids at home and reports feeling unwell with shortness of breath, generalized weakness and multiple episodes of syncope for the past week. Her  has the flu and she also tested positive for Flu A. CTA of chest performed in ED and negative for PE or infiltrate but did show incidental pulmonary nodule with recommended outpatient follow-up . Patient requiring oxygen via nasal cannula despite IV magnesium, IV steroids, and multiple breathing treatments. Patient has persistent severe cough but feels as if she is unable to cough up mucous. Noted to have pursed lip breathing. Labs unremarkable. Patient admitted by hospital medicine for further evaluation and management.     Overview/Hospital Course:  [facility].  Replacement supplemental oxygen, her baseline is room air however she has needed oxygen in the past after viral infection.  Placed on IV steroids.  CRP and procalcitonin ordered for increase in white count.    Interval History:  She was seen and examined at bedside during morning rounds.  Currently receiving oxygen per nasal cannula, states baseline is room air, admits to history of requiring  oxygen during viral infection.  She did have a white count this morning 23, recommended follow up CRP and procalcitonin.    Review of Systems   All other systems reviewed and are negative.    Objective:     Vital Signs (Most Recent):  Temp: 98 °F (36.7 °C) (03/29/25 0830)  Pulse: 73 (03/29/25 0830)  Resp: 16 (03/29/25 0830)  BP: 132/61 (03/29/25 0830)  SpO2: 95 % (03/29/25 0830) Vital Signs (24h Range):  Temp:  [97.9 °F (36.6 °C)-98.2 °F (36.8 °C)] 98 °F (36.7 °C)  Pulse:  [72-96] 73  Resp:  [16-22] 16  SpO2:  [95 %-99 %] 95 %  BP: (130-150)/(60-70) 132/61     Weight: 66.5 kg (146 lb 9.7 oz)  Body mass index is 25.97 kg/m².    Intake/Output Summary (Last 24 hours) at 3/29/2025 0946  Last data filed at 3/28/2025 2137  Gross per 24 hour   Intake --   Output 250 ml   Net -250 ml         Physical Exam  Constitutional:       Appearance: Normal appearance.   HENT:      Head: Normocephalic.      Right Ear: Tympanic membrane normal.      Left Ear: Tympanic membrane normal.      Nose: Nose normal.   Eyes:      Pupils: Pupils are equal, round, and reactive to light.   Cardiovascular:      Rate and Rhythm: Normal rate and regular rhythm.   Pulmonary:      Effort: Pulmonary effort is normal.      Breath sounds: Normal breath sounds.      Comments: Reduced aeration  Abdominal:      General: Abdomen is flat.      Palpations: Abdomen is soft.   Musculoskeletal:         General: Normal range of motion.      Cervical back: Normal range of motion.   Skin:     General: Skin is warm and dry.   Neurological:      General: No focal deficit present.      Mental Status: She is alert.   Psychiatric:         Mood and Affect: Mood normal.               Significant Labs: All pertinent labs within the past 24 hours have been reviewed.    Significant Imaging: I have reviewed all pertinent imaging results/findings within the past 24 hours.      Assessment & Plan  COPD exacerbation  Follow up CRP and procalcitonin  Consult pulmonology for COPD  management  Patient's COPD is with exacerbation noted by continued dyspnea, use of accessory muscles for breathing, and worsening of baseline hypoxia currently.  Patient is currently on COPD Pathway. Continue scheduled inhalers Steroids, Antibiotics, and Supplemental oxygen and monitor respiratory status closely.   Acute hypoxemic respiratory failure  Patient with Hypoxic Respiratory failure which is Acute on chronic.  she is on home oxygen at 4 LPM. Supplemental oxygen was provided and noted-      .   Signs/symptoms of respiratory failure include- tachypnea, increased work of breathing, use of accessory muscles, and wheezing. Contributing diagnoses includes - COPD and Influenza A  Labs and images were reviewed. Patient Has recent ABG, which has been reviewed. Will treat underlying causes and adjust management of respiratory failure as follows- IV antibiotics, IV steroids, Scheduled and PRN breathing treatments, Continuous oxygen  Influenza A  -Monitor respiratory status closely  -Contact/respiratory precautions  -PRN antipyretics  -PRN cough medicine      VTE Risk Mitigation (From admission, onward)           Ordered     enoxaparin injection 40 mg  Daily         03/28/25 0042     IP VTE HIGH RISK PATIENT  Once         03/28/25 0042     Place sequential compression device  Until discontinued         03/28/25 0042                    Discharge Planning   CELESTE: 3/30/2025     Code Status: Full Code   Medical Readiness for Discharge Date:   Discharge Plan A: Home Health                        Mauro Argueta NP  Department of Hospital Medicine   [facility]/Surg

## 2025-03-29 NOTE — ASSESSMENT & PLAN NOTE
Follow up CRP and procalcitonin  Consult pulmonology for COPD management  Patient's COPD is with exacerbation noted by continued dyspnea, use of accessory muscles for breathing, and worsening of baseline hypoxia currently.  Patient is currently on COPD Pathway. Continue scheduled inhalers Steroids, Antibiotics, and Supplemental oxygen and monitor respiratory status closely.

## 2025-03-29 NOTE — SUBJECTIVE & OBJECTIVE
Interval History:  She was seen and examined at bedside during morning rounds.  Currently receiving oxygen per nasal cannula, states baseline is room air, admits to history of requiring oxygen during viral infection.  She did have a white count this morning 23, recommended follow up CRP and procalcitonin.    Review of Systems   All other systems reviewed and are negative.    Objective:     Vital Signs (Most Recent):  Temp: 98 °F (36.7 °C) (03/29/25 0830)  Pulse: 73 (03/29/25 0830)  Resp: 16 (03/29/25 0830)  BP: 132/61 (03/29/25 0830)  SpO2: 95 % (03/29/25 0830) Vital Signs (24h Range):  Temp:  [97.9 °F (36.6 °C)-98.2 °F (36.8 °C)] 98 °F (36.7 °C)  Pulse:  [72-96] 73  Resp:  [16-22] 16  SpO2:  [95 %-99 %] 95 %  BP: (130-150)/(60-70) 132/61     Weight: 66.5 kg (146 lb 9.7 oz)  Body mass index is 25.97 kg/m².    Intake/Output Summary (Last 24 hours) at 3/29/2025 0946  Last data filed at 3/28/2025 2137  Gross per 24 hour   Intake --   Output 250 ml   Net -250 ml         Physical Exam  Constitutional:       Appearance: Normal appearance.   HENT:      Head: Normocephalic.      Right Ear: Tympanic membrane normal.      Left Ear: Tympanic membrane normal.      Nose: Nose normal.   Eyes:      Pupils: Pupils are equal, round, and reactive to light.   Cardiovascular:      Rate and Rhythm: Normal rate and regular rhythm.   Pulmonary:      Effort: Pulmonary effort is normal.      Breath sounds: Normal breath sounds.      Comments: Reduced aeration  Abdominal:      General: Abdomen is flat.      Palpations: Abdomen is soft.   Musculoskeletal:         General: Normal range of motion.      Cervical back: Normal range of motion.   Skin:     General: Skin is warm and dry.   Neurological:      General: No focal deficit present.      Mental Status: She is alert.   Psychiatric:         Mood and Affect: Mood normal.               Significant Labs: All pertinent labs within the past 24 hours have been reviewed.    Significant Imaging: I  have reviewed all pertinent imaging results/findings within the past 24 hours.

## 2025-03-29 NOTE — CARE UPDATE
03/28/25 1918 03/28/25 1923   Patient Assessment/Suction   Level of Consciousness (AVPU) alert alert   Respiratory Effort Mild;Short of breath Short of breath;Mild   Expansion/Accessory Muscles/Retractions no retractions no retractions;no use of accessory muscles   All Lung Fields Breath Sounds coarse;wheezes, expiratory coarse;wheezes, expiratory   LLL Breath Sounds  --  diminished   RLL Breath Sounds  --  diminished   Cough Frequency  --  frequent   Cough Type  --  congested;nonproductive   PRE-TX-O2   Device (Oxygen Therapy) nasal cannula with humidification nasal cannula with humidification   $ Is the patient on Low Flow Oxygen? Yes Yes   Flow (L/min) (Oxygen Therapy) 4 4   SpO2 95 % 98 %   Pulse Oximetry Type Intermittent Intermittent   $ Pulse Oximetry - Multiple Charge Pulse Oximetry - Multiple Pulse Oximetry - Multiple   Pulse 81 80   Resp (!) 22 (!) 22   Aerosol Therapy   $ Aerosol Therapy Charges Aerosol Treatment  (duoneb) Aerosol Treatment  (pulmicort)   Respiratory Treatment Status (SVN) given given   Treatment Route (SVN) mask;oxygen mask;oxygen   Patient Position HOB elevated HOB elevated   Post Treatment Assessment (SVN) wheezing decreased;breath sounds improved wheezing decreased;breath sounds improved   Signs of Intolerance (SVN) none none   Breath Sounds Post-Respiratory Treatment   Throughout All Fields Post-Treatment All Fields All Fields   Throughout All Fields Post-Treatment coarse;wheezes, expiratory wheezes, expiratory;coarse   Post-treatment Heart Rate (beats/min) 87 78   Post-treatment Resp Rate (breaths/min) 22 20

## 2025-03-29 NOTE — HOSPITAL COURSE
During hospitalization patient was admitted to [facility] for evaluation and management of COPD exacerbation.  Replacement supplemental oxygen, her baseline is room air however she has needed oxygen in the past after viral infection.  Placed on IV steroids.  CRP and procalcitonin ordered and reviewed.  She previously had a home O2 eval and qualified for oxygen, this was repeated on the day of discharge and she still qualifies.  She was seen and examined on the day of discharge in his medically stable.

## 2025-03-29 NOTE — PLAN OF CARE
Problem: Adult Inpatient Plan of Care  Goal: Plan of Care Review  Outcome: Progressing  Goal: Patient-Specific Goal (Individualized)  Outcome: Progressing  Goal: Absence of Hospital-Acquired Illness or Injury  Outcome: Progressing  Goal: Optimal Comfort and Wellbeing  Outcome: Progressing  Goal: Readiness for Transition of Care  Outcome: Progressing     Problem: COPD (Chronic Obstructive Pulmonary Disease)  Goal: Optimal Chronic Illness Coping  Outcome: Progressing  Goal: Optimal Level of Functional [place]  Outcome: Progressing  Goal: Absence of Infection Signs and Symptoms  Outcome: Progressing  Goal: Improved Oral Intake  Outcome: Progressing  Goal: Effective Oxygenation and Ventilation  Outcome: Progressing     Problem: Infection  Goal: Absence of Infection Signs and Symptoms  Outcome: Progressing     Problem: Skin Injury Risk Increased  Goal: Skin Health and Integrity  Outcome: Progressing

## 2025-03-29 NOTE — ASSESSMENT & PLAN NOTE
Follow up CRP and procalcitonin  Consult pulmonology for COPD management.    Patient's COPD is with exacerbation noted by continued dyspnea, use of accessory muscles for breathing, and worsening of baseline hypoxia currently.  Patient is currently on COPD Pathway. Continue scheduled inhalers Steroids, Antibiotics, and Supplemental oxygen and monitor respiratory status closely.

## 2025-03-29 NOTE — NURSING
Sn called Dr. Jimenez who is on call for Pulmonary. Had to leave a message on his on call phone. Waiting on return call. Updated primary nurse

## 2025-03-29 NOTE — CARE UPDATE
03/29/25 0722   Patient Assessment/Suction   Level of Consciousness (AVPU) alert   Respiratory Effort Normal   Expansion/Accessory Muscles/Retractions expansion symmetric   All Lung Fields Breath Sounds diminished   BINTA Breath Sounds wheezes, expiratory   LLL Breath Sounds diminished   RUL Breath Sounds wheezes, expiratory   RML Breath Sounds diminished   RLL Breath Sounds diminished   Rhythm/Pattern, Respiratory pattern regular   Cough Frequency infrequent   Cough Type good;loose;nonproductive   PRE-TX-O2   Device (Oxygen Therapy) nasal cannula with humidification   $ Is the patient on Low Flow Oxygen? Yes   Flow (L/min) (Oxygen Therapy) (S)  4  (Decreased to 3 Lpm)   SpO2 98 %   Pulse Oximetry Type Intermittent   $ Pulse Oximetry - Multiple Charge Pulse Oximetry - Multiple   Pulse 72   Resp 18   Aerosol Therapy   $ Aerosol Therapy Charges Aerosol Treatment  (Duoneb)   Daily Review of Necessity (SVN) completed   Respiratory Treatment Status (SVN) given   Treatment Route (SVN) mask   Patient Position semi-Robison's   Signs of Intolerance (SVN) none   Breath Sounds Post-Respiratory Treatment   Throughout All Fields Post-Treatment aeration increased;wheezes, expiratory   Post-treatment Heart Rate (beats/min) 76   Post-treatment Resp Rate (breaths/min) 20

## 2025-03-29 NOTE — PLAN OF CARE
Problem: Adult Inpatient Plan of Care  Goal: Plan of Care Review  Outcome: Progressing  Goal: Patient-Specific Goal (Individualized)  Outcome: Progressing  Goal: Absence of Hospital-Acquired Illness or Injury  Outcome: Progressing  Goal: Optimal Comfort and Wellbeing  Outcome: Progressing  Goal: Readiness for Transition of Care  Outcome: Progressing     Problem: COPD (Chronic Obstructive Pulmonary Disease)  Goal: Optimal Chronic Illness Coping  Outcome: Progressing  Goal: Optimal Level of Functional [place]  Outcome: Progressing  Goal: Absence of Infection Signs and Symptoms  Outcome: Progressing  Goal: Improved Oral Intake  Outcome: Progressing  Goal: Effective Oxygenation and Ventilation  Outcome: Progressing     Problem: Infection  Goal: Absence of Infection Signs and Symptoms  Outcome: Progressing     Problem: Skin Injury Risk Increased  Goal: Skin Health and Integrity  Outcome: Progressing

## 2025-03-29 NOTE — NURSING
received call from Dr. Jimenez who states pt will be seen on Monday by Dr. Logan for COPD consult. SN notified hospitalist  Mauro HOPE and updated primary nurse Noreen MONROY

## 2025-03-30 VITALS
HEART RATE: 72 BPM | RESPIRATION RATE: 18 BRPM | DIASTOLIC BLOOD PRESSURE: 84 MMHG | OXYGEN SATURATION: 97 % | SYSTOLIC BLOOD PRESSURE: 188 MMHG | TEMPERATURE: 98 F | HEIGHT: 63 IN | WEIGHT: 146.63 LBS | BODY MASS INDEX: 25.98 KG/M2

## 2025-03-30 LAB
ANION GAP (SMH): 8 MMOL/L (ref 8–16)
BACTERIA BLD CULT: NORMAL
BACTERIA BLD CULT: NORMAL
BUN SERPL-MCNC: 33 MG/DL (ref 8–23)
CALCIUM SERPL-MCNC: 8.2 MG/DL (ref 8.7–10.5)
CHLORIDE SERPL-SCNC: 105 MMOL/L (ref 95–110)
CO2 SERPL-SCNC: 25 MMOL/L (ref 23–29)
CREAT SERPL-MCNC: 1.3 MG/DL (ref 0.5–1.4)
ERYTHROCYTE [DISTWIDTH] IN BLOOD BY AUTOMATED COUNT: 13.6 % (ref 11.5–14.5)
GFR SERPLBLD CREATININE-BSD FMLA CKD-EPI: 44 ML/MIN/1.73/M2
GLUCOSE SERPL-MCNC: 160 MG/DL (ref 70–110)
HCT VFR BLD AUTO: 39.3 % (ref 37–48.5)
HGB BLD-MCNC: 12.6 GM/DL (ref 12–16)
LYMPHOCYTES NFR BLD MANUAL: 6 % (ref 18–48)
MAGNESIUM SERPL-MCNC: 2.2 MG/DL (ref 1.6–2.6)
MCH RBC QN AUTO: 30.3 PG (ref 27–31)
MCHC RBC AUTO-ENTMCNC: 32.1 G/DL (ref 32–36)
MCV RBC AUTO: 95 FL (ref 82–98)
MONOCYTES NFR BLD MANUAL: 2 % (ref 4–15)
NEUTROPHILS NFR BLD MANUAL: 92 % (ref 38–73)
NUCLEATED RBC (/100WBC) (SMH): 0 /100 WBC
PHOSPHATE SERPL-MCNC: 3.3 MG/DL (ref 2.7–4.5)
PLATELET # BLD AUTO: 132 K/UL (ref 150–450)
PMV BLD AUTO: 12.1 FL (ref 9.2–12.9)
POTASSIUM SERPL-SCNC: 4.5 MMOL/L (ref 3.5–5.1)
RBC # BLD AUTO: 4.16 M/UL (ref 4–5.4)
SODIUM SERPL-SCNC: 138 MMOL/L (ref 136–145)
WBC # BLD AUTO: 25.72 K/UL (ref 3.9–12.7)

## 2025-03-30 PROCEDURE — 83735 ASSAY OF MAGNESIUM: CPT

## 2025-03-30 PROCEDURE — 94761 N-INVAS EAR/PLS OXIMETRY MLT: CPT

## 2025-03-30 PROCEDURE — 36415 COLL VENOUS BLD VENIPUNCTURE: CPT

## 2025-03-30 PROCEDURE — 25000242 PHARM REV CODE 250 ALT 637 W/ HCPCS: Performed by: INTERNAL MEDICINE

## 2025-03-30 PROCEDURE — 99900031 HC PATIENT EDUCATION (STAT)

## 2025-03-30 PROCEDURE — 25000242 PHARM REV CODE 250 ALT 637 W/ HCPCS

## 2025-03-30 PROCEDURE — 84100 ASSAY OF PHOSPHORUS: CPT

## 2025-03-30 PROCEDURE — 63600175 PHARM REV CODE 636 W HCPCS: Mod: JZ,TB

## 2025-03-30 PROCEDURE — 85025 COMPLETE CBC W/AUTO DIFF WBC: CPT

## 2025-03-30 PROCEDURE — 27000221 HC OXYGEN, UP TO 24 HOURS

## 2025-03-30 PROCEDURE — 25000003 PHARM REV CODE 250

## 2025-03-30 PROCEDURE — 80048 BASIC METABOLIC PNL TOTAL CA: CPT

## 2025-03-30 PROCEDURE — 94618 PULMONARY STRESS TESTING: CPT

## 2025-03-30 PROCEDURE — 94640 AIRWAY INHALATION TREATMENT: CPT

## 2025-03-30 RX ORDER — PREDNISONE 20 MG/1
TABLET ORAL
Qty: 15 TABLET | Refills: 0 | Status: SHIPPED | OUTPATIENT
Start: 2025-03-30 | End: 2025-04-11 | Stop reason: SDUPTHER

## 2025-03-30 RX ORDER — IBUPROFEN 200 MG
1 TABLET ORAL DAILY
Qty: 30 PATCH | Refills: 0 | Status: SHIPPED | OUTPATIENT
Start: 2025-03-30

## 2025-03-30 RX ORDER — LEVOFLOXACIN 750 MG/1
750 TABLET, FILM COATED ORAL DAILY
Qty: 4 TABLET | Refills: 0 | Status: SHIPPED | OUTPATIENT
Start: 2025-03-30 | End: 2025-04-03

## 2025-03-30 RX ORDER — BENZONATATE 100 MG/1
100 CAPSULE ORAL 3 TIMES DAILY PRN
Qty: 30 CAPSULE | Refills: 0 | Status: SHIPPED | OUTPATIENT
Start: 2025-03-30 | End: 2025-04-09

## 2025-03-30 RX ORDER — ALBUTEROL SULFATE 90 UG/1
2 INHALANT RESPIRATORY (INHALATION) EVERY 6 HOURS PRN
Qty: 8 G | Refills: 0 | Status: SHIPPED | OUTPATIENT
Start: 2025-03-30 | End: 2025-04-11 | Stop reason: SDUPTHER

## 2025-03-30 RX ORDER — IPRATROPIUM BROMIDE AND ALBUTEROL SULFATE 2.5; .5 MG/3ML; MG/3ML
3 SOLUTION RESPIRATORY (INHALATION) EVERY 6 HOURS PRN
Qty: 75 ML | Refills: 0 | Status: SHIPPED | OUTPATIENT
Start: 2025-03-30 | End: 2025-04-11 | Stop reason: SDUPTHER

## 2025-03-30 RX ADMIN — GUAIFENESIN 600 MG: 600 TABLET, EXTENDED RELEASE ORAL at 09:03

## 2025-03-30 RX ADMIN — METHYLPREDNISOLONE SODIUM SUCCINATE 40 MG: 40 INJECTION, POWDER, FOR SOLUTION INTRAMUSCULAR; INTRAVENOUS at 06:03

## 2025-03-30 RX ADMIN — PANTOPRAZOLE SODIUM 40 MG: 40 TABLET, DELAYED RELEASE ORAL at 09:03

## 2025-03-30 RX ADMIN — BUDESONIDE 0.5 MG: 0.5 INHALANT RESPIRATORY (INHALATION) at 07:03

## 2025-03-30 RX ADMIN — IPRATROPIUM BROMIDE AND ALBUTEROL SULFATE 3 ML: .5; 3 SOLUTION RESPIRATORY (INHALATION) at 12:03

## 2025-03-30 RX ADMIN — IPRATROPIUM BROMIDE AND ALBUTEROL SULFATE 3 ML: .5; 3 SOLUTION RESPIRATORY (INHALATION) at 03:03

## 2025-03-30 RX ADMIN — ASPIRIN 81 MG: 81 TABLET, COATED ORAL at 09:03

## 2025-03-30 RX ADMIN — METHYLPREDNISOLONE SODIUM SUCCINATE 40 MG: 40 INJECTION, POWDER, FOR SOLUTION INTRAMUSCULAR; INTRAVENOUS at 03:03

## 2025-03-30 RX ADMIN — IPRATROPIUM BROMIDE AND ALBUTEROL SULFATE 3 ML: .5; 3 SOLUTION RESPIRATORY (INHALATION) at 07:03

## 2025-03-30 RX ADMIN — IPRATROPIUM BROMIDE AND ALBUTEROL SULFATE 3 ML: .5; 3 SOLUTION RESPIRATORY (INHALATION) at 11:03

## 2025-03-30 NOTE — CARE UPDATE
03/29/25 1900 03/29/25 1906   Patient Assessment/Suction   Level of Consciousness (AVPU) alert alert   Respiratory Effort Mouth breathing  (pt states her normal) Mild;Mouth breathing   Expansion/Accessory Muscles/Retractions no retractions;no use of accessory muscles no use of accessory muscles;no retractions   All Lung Fields Breath Sounds  --  diminished   BINTA Breath Sounds diminished  --    LLL Breath Sounds diminished  --    RUL Breath Sounds wheezes, expiratory coarse   RML Breath Sounds coarse coarse   RLL Breath Sounds coarse diminished   PRE-TX-O2   Device (Oxygen Therapy) nasal cannula with humidification nasal cannula with humidification   $ Is the patient on Low Flow Oxygen? Yes Yes   Flow (L/min) (Oxygen Therapy) 3 3   SpO2 98 % 98 %   Pulse Oximetry Type Intermittent Intermittent   $ Pulse Oximetry - Multiple Charge Pulse Oximetry - Multiple Pulse Oximetry - Multiple   Pulse 72 78   Resp (!) 24 20   Aerosol Therapy   $ Aerosol Therapy Charges Aerosol Treatment  (duoneb) Aerosol Treatment  (pulmicort)   Respiratory Treatment Status (SVN) given given   Treatment Route (SVN) mask;oxygen mask;oxygen   Patient Position HOB elevated HOB elevated   Post Treatment Assessment (SVN) increased aeration;wheezing decreased increased aeration   Signs of Intolerance (SVN) none none   Breath Sounds Post-Respiratory Treatment   Throughout All Fields Post-Treatment All Fields All Fields   Throughout All Fields Post-Treatment aeration increased aeration increased   Post-treatment Heart Rate (beats/min) 78 76   Post-treatment Resp Rate (breaths/min) 20 20

## 2025-03-30 NOTE — DISCHARGE INSTRUCTIONS
Our goal at [facility] is to always give you outstanding care and exceptional service. You may receive a survey from [employer] by mail, text or e-mail in the next 24-48 hours asking about the care you received with us. The survey should only take 5-10 minutes to complete and is very important to us.     Your feedback provides us with a way to recognize our staff who work tirelessly to provide the best care! Also, your responses help us learn how to improve when your experience was below our aspiration of excellence. We are always looking for ways to improve your stay. We WILL use your feedback to continue making improvements to help us provide the highest quality care. We keep your personal information and feedback confidential. We appreciate your time completing this survey and can't wait to hear from you!!!    We look forward to your continued care with us! Thanks so much for choosing [facility] for your healthcare needs!

## 2025-03-30 NOTE — PLAN OF CARE
Pt qualifies for home oxygen.  Orders received and referral sent to [facility].  Awaiting approval.       03/30/25 1046   Post-Acute Status   Post-Acute Authorization E   E Status Referrals Sent

## 2025-03-30 NOTE — PLAN OF CARE
Problem: Adult Inpatient Plan of Care  Goal: Plan of Care Review  Outcome: Progressing  Goal: Patient-Specific Goal (Individualized)  Outcome: Progressing  Goal: Absence of Hospital-Acquired Illness or Injury  Outcome: Progressing  Goal: Optimal Comfort and Wellbeing  Outcome: Progressing  Goal: Readiness for Transition of Care  Outcome: Progressing     Problem: COPD (Chronic Obstructive Pulmonary Disease)  Goal: Optimal Chronic Illness Coping  Outcome: Progressing  Goal: Optimal Level of Functional [place]  Outcome: Progressing  Goal: Absence of Infection Signs and Symptoms  Outcome: Progressing  Goal: Improved Oral Intake  Outcome: Progressing  Goal: Effective Oxygenation and Ventilation  Outcome: Progressing     Problem: Infection  Goal: Absence of Infection Signs and Symptoms  Outcome: Progressing     Problem: Skin Injury Risk Increased  Goal: Skin Health and Integrity  Outcome: Progressing

## 2025-03-30 NOTE — DISCHARGE SUMMARY
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 Patient recently discharged on 3/25 after being admitted for COPD exacerbation. She has been taking oral steroids at home and reports feeling unwell with shortness of breath, generalized weakness and multiple episodes of syncope for the past week. Her  has the flu and she also tested positive for Flu A. CTA of chest performed in ED and negative for PE or infiltrate but did show incidental pulmonary nodule with recommended outpatient follow-up . Patient requiring oxygen via nasal cannula despite IV magnesium, IV steroids, and multiple breathing treatments. Patient has persistent severe cough but feels as if she is unable to cough up mucous. Noted to have pursed lip breathing. Labs unremarkable. Patient admitted by hospital medicine for further evaluation and management.     * No surgery found *      Hospital Course:   During hospitalization patient was admitted to [facility] telemetry for evaluation and management of COPD exacerbation.  Replacement supplemental oxygen, her baseline is room air however she has needed oxygen in the past after viral infection.  Placed on IV steroids.  CRP and procalcitonin ordered and reviewed.  She previously had a home O2 eval and qualified for oxygen, this was  repeated on the day of discharge and she still qualifies.  She was seen and examined on the day of discharge in his medically stable.     Goals of Care Treatment Preferences:  Code Status: Full Code      SDOH Screening:  The patient was screened for utility difficulties, food insecurity, transport difficulties, housing insecurity, and interpersonal safety and there were no concerns identified this admission.     Consults:   Consults (From admission, onward)          Status Ordering Provider     Inpatient consult to Pulmonology  Once        Provider:  Robert Jimenez MD    Acknowledged ADI SPENCE            Assessment & Plan  COPD exacerbation  Follow up CRP and procalcitonin  Consult pulmonology for COPD management.    Patient's COPD is with exacerbation noted by continued dyspnea, use of accessory muscles for breathing, and worsening of baseline hypoxia currently.  Patient is currently on COPD Pathway. Continue scheduled inhalers Steroids, Antibiotics, and Supplemental oxygen and monitor respiratory status closely.   Acute hypoxemic respiratory failure  Patient with Hypoxic Respiratory failure which is Acute on chronic.  she is on home oxygen at 4 LPM. Supplemental oxygen was provided and noted-      .   Signs/symptoms of respiratory failure include- tachypnea, increased work of breathing, use of accessory muscles, and wheezing. Contributing diagnoses includes - COPD and Influenza A  Labs and images were reviewed. Patient Has recent ABG, which has been reviewed. Will treat underlying causes and adjust management of respiratory failure as follows- IV antibiotics, IV steroids, Scheduled and PRN breathing treatments, Continuous oxygen  Influenza A  -Monitor respiratory status closely  -Contact/respiratory precautions  -PRN antipyretics  -PRN cough medicine      Final Active Diagnoses:    Diagnosis Date Noted POA    PRINCIPAL PROBLEM:  COPD exacerbation [J44.1] 03/23/2025 Yes    Influenza A [J10.1]  03/28/2025 Yes    Acute hypoxemic respiratory failure [J96.01] 03/23/2025 Yes      Problems Resolved During this Admission:       Discharged Condition: stable    Disposition: Home or Self Care    Follow Up:   Follow-up Information       Maehler, Jewel A., FNP. Go on 4/3/2025.    Specialty: Family Medicine  Why: Hospital follow- up for Thursday April 3rd at 9:00am at the Discharge Clinic  Contact information:  [facility]  [address] [place] [id number]  [phone]                           Patient Instructions:   No discharge procedures on file.    Significant Diagnostic Studies: N/A    Pending Diagnostic Studies:       None           Medications:  Reconciled Home Medications:      Medication List        START taking these medications      benzonatate 100 MG capsule  Commonly known as: TESSALON  Take 1 capsule (100 mg total) by mouth 3 (three) times daily as needed for Cough.     levoFLOXacin 750 MG tablet  Commonly known as: LEVAQUIN  Take 1 tablet (750 mg total) by mouth once daily. for 4 days     nicotine 21 mg/24 hr  Commonly known as: NICODERM CQ  Place 1 patch onto the skin once daily.            CHANGE how you take these medications      albuterol-ipratropium 2.5 mg-0.5 mg/3 mL nebulizer solution  Commonly known as: DUO-NEB  Take 3 mLs by nebulization every 6 (six) hours as needed for Shortness of Breath.  What changed: how to take this     predniSONE 20 MG tablet  Commonly known as: DELTASONE  Take 2 tablets (40 mg total) by mouth once daily for 3 days, THEN 1.5 tablets (30 mg total) once daily for 3 days, THEN 1 tablet (20 mg total) once daily for 3 days, THEN 0.5 tablets (10 mg total) once daily for 3 days.  Start taking on: March 30, 2025  What changed: See the new instructions.            CONTINUE taking these medications      albuterol 90 mcg/actuation inhaler  Commonly known as: PROVENTIL/VENTOLIN HFA  Inhale 2 puffs into the lungs every 6 (six) hours as needed for Wheezing. Rescue     aspirin 81 MG  EC tablet  Commonly known as: ECOTRIN  Take 1 tablet (81 mg total) by mouth once daily.     atorvastatin 40 MG tablet  Commonly known as: LIPITOR  Take 2 tablets (80 mg total) by mouth every evening.     CAPSICUM (CAYENNE) ORAL  Take 1 capsule by mouth Daily.     CINNAMON 500 mg capsule  Generic drug: cinnamon bark  Take 500 mg by mouth once daily.     famotidine 20 MG tablet  Commonly known as: PEPCID  Take 1 tablet (20 mg total) by mouth once daily.     NEGRITA EXTRACT ORAL  Take 1 capsule by mouth once daily.     guaiFENesin 100 mg/5 ml 100 mg/5 mL syrup  Commonly known as: ROBITUSSIN  Take 20 mLs (400 mg total) by mouth 4 (four) times daily. for 10 days     OXYGEN-AIR DELIVERY SYSTEMS MISC  by Misc.(Non-Drug; Combo Route) route. 3 L by nasal route     vitamin D 1000 units Tab  Commonly known as: VITAMIN D3  Take 1,000 Units by mouth once daily.            STOP taking these medications      azithromycin 250 MG tablet  Commonly known as: Z-RENE              Indwelling Lines/Drains at time of discharge:   Lines/Drains/Airways       Drain  Duration             Female External Urinary Catheter w/ Suction 03/29/25 1800 <1 day                    Time spent on the discharge of patient: 32 minutes         Mauro Argueta NP  Department of Hospital Medicine  [facility]/Surg

## 2025-03-30 NOTE — PLAN OF CARE
Problem: Adult Inpatient Plan of Care  Goal: Plan of Care Review  Outcome:  Error  Goal: Patient-Specific Goal (Individualized)  Outcome:  Error  Goal: Absence of Hospital-Acquired Illness or Injury  Outcome:  Error  Goal: Optimal Comfort and Wellbeing  Outcome:  Error  Goal: Readiness for Transition of Care  Outcome:  Error     Problem: COPD (Chronic Obstructive Pulmonary Disease)  Goal: Optimal Chronic Illness Coping  Outcome:  Error  Goal: Optimal Level of Functional [place]  Outcome:  Error  Goal: Absence of Infection Signs and Symptoms  Outcome:  Error  Goal: Improved Oral Intake  Outcome:  Error  Goal: Effective Oxygenation and Ventilation  Outcome:  Error     Problem: Infection  Goal: Absence of Infection Signs and Symptoms  Outcome:  Error     Problem: Skin Injury Risk Increased  Goal: Skin Health and Integrity  Outcome:  Error   POC has been reviewed with pt and .  Pt is at baseline and on O2 3L NC.  Pt had home O2 evaluation done and pt requires O2 at home.  No c/o pain voiced.  No falls during this shift.  No replacements needed today.  Pt is adequate for going home today.

## 2025-03-30 NOTE — PLAN OF CARE
Pt clear for DC from case management standpoint. Discharging to home.  HH- [facility] 03/31/25  Tona Baca from [facility] approved etank. CM delivered to pt's bedside. Returned completed delivery ticket to [employer].  Communication sent to respiratory for etank education.    Pending respiratory education.       03/30/25 1244   Final Note   Assessment Type Final Discharge Note   Anticipated Discharge Disposition Home-Health   Post-Acute Status   Post-Acute Authorization HME;Home Health   HME Status Set-up Complete/Auth obtained   Home Health Status Set-up Complete/Auth obtained

## 2025-03-30 NOTE — CARE UPDATE
03/30/25 0701   Patient Assessment/Suction   Level of Consciousness (AVPU) alert   Respiratory Effort Normal   Expansion/Accessory Muscles/Retractions expansion symmetric   BINTA Breath Sounds diminished   LLL Breath Sounds diminished   RUL Breath Sounds diminished   RML Breath Sounds diminished   RLL Breath Sounds diminished   Rhythm/Pattern, Respiratory pattern regular   Cough Frequency infrequent   Cough Type good;loose;nonproductive   PRE-TX-O2   Device (Oxygen Therapy) nasal cannula with humidification   $ Is the patient on Low Flow Oxygen? Yes   Flow (L/min) (Oxygen Therapy) 3   SpO2 100 %   Pulse Oximetry Type Intermittent   $ Pulse Oximetry - Multiple Charge Pulse Oximetry - Multiple   Pulse 74   Resp 18   Aerosol Therapy   $ Aerosol Therapy Charges Aerosol Treatment  (Duoneb)   Daily Review of Necessity (SVN) completed   Respiratory Treatment Status (SVN) given   Treatment Route (SVN) mask   Patient Position semi-Robison's   Signs of Intolerance (SVN) none   Breath Sounds Post-Respiratory Treatment   Throughout All Fields Post-Treatment aeration increased   Post-treatment Heart Rate (beats/min) 82   Post-treatment Resp Rate (breaths/min) 18

## 2025-03-30 NOTE — CARE UPDATE
03/30/25 1000   Home Oxygen Qualification   $ Home O2 Qualification Pulmonary Stress Test/6 min walk   Room Air SpO2 At Rest 93 %   Room Air SpO2 During Ambulation (!) 87 %   SpO2 During Ambulation on O2 92 %   Heart Rate on O2 91 bpm   Ambulation O2 LPM 3 LPM   SpO2 Post Ambulation 96 %   Post Ambulation Heart Rate 86 bpm   Post Ambulation O2 LPM 3 LPM   Home O2 Eval Comments Patient qualifies for home oxygen 3 Lpm

## 2025-03-31 LAB
OHS QRS DURATION: 72 MS
OHS QTC CALCULATION: 486 MS

## 2025-04-01 LAB
BACTERIA BLD CULT: NORMAL
BACTERIA BLD CULT: NORMAL

## 2025-04-11 ENCOUNTER — OFFICE VISIT (OUTPATIENT)
Facility: CLINIC | Age: 70
End: 2025-04-11
Payer: MEDICARE

## 2025-04-11 VITALS
HEART RATE: 84 BPM | RESPIRATION RATE: 16 BRPM | HEIGHT: 63 IN | SYSTOLIC BLOOD PRESSURE: 119 MMHG | OXYGEN SATURATION: 98 % | TEMPERATURE: 98 F | DIASTOLIC BLOOD PRESSURE: 76 MMHG | BODY MASS INDEX: 25.97 KG/M2

## 2025-04-11 DIAGNOSIS — R30.0 DYSURIA: ICD-10-CM

## 2025-04-11 DIAGNOSIS — J44.1 COPD EXACERBATION: Primary | ICD-10-CM

## 2025-04-11 DIAGNOSIS — Z95.3 S/P TAVR (TRANSCATHETER AORTIC VALVE REPLACEMENT), BIOPROSTHETIC: ICD-10-CM

## 2025-04-11 DIAGNOSIS — J10.1 INFLUENZA A: ICD-10-CM

## 2025-04-11 DIAGNOSIS — F17.200 TOBACCO DEPENDENCE: ICD-10-CM

## 2025-04-11 LAB
BACTERIA #/AREA URNS AUTO: ABNORMAL /HPF
BILIRUB UR QL STRIP.AUTO: NEGATIVE
CLARITY UR: ABNORMAL
COLOR UR AUTO: YELLOW
GLUCOSE UR QL STRIP: NEGATIVE
HGB UR QL STRIP: NEGATIVE
HYALINE CASTS UR QL AUTO: 0 /LPF (ref 0–1)
KETONES UR QL STRIP: NEGATIVE
LEUKOCYTE ESTERASE UR QL STRIP: ABNORMAL
MICROSCOPIC COMMENT: ABNORMAL
NITRITE UR QL STRIP: NEGATIVE
PH UR STRIP: 6 [PH]
PROT UR QL STRIP: ABNORMAL
RBC #/AREA URNS AUTO: 8 /HPF (ref 0–4)
SP GR UR STRIP: 1.02
SQUAMOUS #/AREA URNS AUTO: 6 /HPF
UROBILINOGEN UR STRIP-ACNC: ABNORMAL EU/DL
WBC #/AREA URNS AUTO: 62 /HPF (ref 0–5)

## 2025-04-11 PROCEDURE — 81003 URINALYSIS AUTO W/O SCOPE: CPT

## 2025-04-11 PROCEDURE — 87086 URINE CULTURE/COLONY COUNT: CPT

## 2025-04-11 PROCEDURE — 99999 PR PBB SHADOW E&M-EST. PATIENT-LVL III: CPT | Mod: PBBFAC,,,

## 2025-04-11 RX ORDER — IPRATROPIUM BROMIDE AND ALBUTEROL SULFATE 2.5; .5 MG/3ML; MG/3ML
3 SOLUTION RESPIRATORY (INHALATION) EVERY 6 HOURS PRN
Qty: 75 ML | Refills: 3 | Status: SHIPPED | OUTPATIENT
Start: 2025-04-11

## 2025-04-11 RX ORDER — ALBUTEROL SULFATE 90 UG/1
2 INHALANT RESPIRATORY (INHALATION) EVERY 6 HOURS PRN
Qty: 18 G | Refills: 3 | Status: SHIPPED | OUTPATIENT
Start: 2025-04-11

## 2025-04-11 RX ORDER — PREDNISONE 20 MG/1
TABLET ORAL
Qty: 15 TABLET | Refills: 0 | Status: SHIPPED | OUTPATIENT
Start: 2025-04-11 | End: 2025-04-23

## 2025-04-11 RX ORDER — BUDESONIDE, GLYCOPYRROLATE, AND FORMOTEROL FUMARATE 160; 9; 4.8 UG/1; UG/1; UG/1
1 AEROSOL, METERED RESPIRATORY (INHALATION) 2 TIMES DAILY
Qty: 10.7 G | Refills: 3 | Status: SHIPPED | OUTPATIENT
Start: 2025-04-11

## 2025-04-11 NOTE — ASSESSMENT & PLAN NOTE
Improved per patient.  Continue supportive therapy as needed.  Recommend yearly vaccination against influenza.

## 2025-04-11 NOTE — ASSESSMENT & PLAN NOTE
Per patient, has not smoked for 1 week.  Encouraged continued smoking cessation, counseled on importance of continued cessation.  Smoking cessation aids/program offered, patient declines at this time.

## 2025-04-11 NOTE — PROGRESS NOTES
Subjective:  Chief Complaint   Patient presents with    Hospital Follow Up       History of Present Illness    Transitional Care Note    Family and/or Caretaker present at visit?  Yes.  Diagnostic tests reviewed/disposition: No diagnosic tests pending after this hospitalization.  Disease/illness education: COPD education.  Home health/community services discussion/referrals: Patient has home health established at [facility] .   Establishment or re-establishment of referral orders for community resources: No other necessary community resources.   Discussion with other health care providers: No discussion with other health care providers necessary.     This 70 y.o. presents today for complaint of hospital follow-up for COPD exacerbation.  She wears oxygen at night when she goes to sleep but has not needed to wear it during the day. Has a nebulizer machine but requests order for new one.  She has not smoked a cigarettes in 7 days.  Se has hx of TAVR, reports she used to see Dr Islas but it has been a long time since she has followed up.    History of Present Illness    CHIEF COMPLAINT:  Patient presents for follow-up after recent hospitalizations for COPD exacerbation and flu, with ongoing respiratory issues and urinary discomfort.    HPI:  Patient has been hospitalized 3 times recently, twice at this facility, for COPD exacerbation and flu. She reports severe fatigue and states that today is her first day out of the house in about 5 weeks, aside from hospital visits. Her breathing has not returned to normal but is improving. She uses oxygen at night and sometimes during the day via a concentrator. She is currently on room air with O2 saturation of 98%.    She has frequent coughing fits but rarely expectorates. She denies fever, even during her recent flu illness. She reports she never received her prednisone prescription after hospital discharge and therefore has not taken it.    She reports burning and pain when  urinating, which started after returning home from the hospital. This symptom began on the second day after returning home and is ongoing. She denies hematuria, flank pain.    She stopped smoking 7 days ago.     She reports she has a history of atrial fibrillation and a heart valve replacement. She has a Loop recorder. She is evaluated by Dr. Islas for cardiology.    She mentions having a bladder sling that has lost its effectiveness, causing her to take a long time to urinate completely. She denies any bladder prolapse, nausea, vomiting, and flank pain.    MEDICATIONS:  Patient is on Albuterol inhaler as needed every 4-6 hours for shortness of breath. She uses oxygen at night and sometimes during the day. She is also taking Magnesium for muscle spasms. Patient discontinued smoking 7 days ago.    MEDICAL HISTORY:  Patient has a history of COPD and TAVR.    SURGICAL HISTORY:  Patient has undergone heart valve replacement and bladder sling placement.    TEST RESULTS:  Patient's potassium level was 4.5, which is within normal range, when leaving the hospital. A urinalysis performed in the hospital was negative for infection.      ROS:  General: -fever, +fatigue  Respiratory: +shortness of breath, +wheezing  Gastrointestinal: -nausea, -vomiting, +loss of appetite, +appetite changes  Genitourinary: +dysuria, +painful urination  Musculoskeletal: +muscle spasms         Review of Systems   Constitutional:  Positive for malaise/fatigue. Negative for chills and fever.   Respiratory:  Positive for cough and shortness of breath.         SOB on exertion at baseline. Dry cough.   Gastrointestinal:  Negative for nausea and vomiting.   Genitourinary:  Positive for dysuria and urgency. Negative for flank pain.       (D/C Summary)  Admission Date: 3/27/2025  Hospital Length of Stay: 3 days  Discharge Date and Time:  03/30/2025 10:25 AM  Attending Physician: Jaleesa Marsh MD   Discharging Provider: Mauro Argueta NP  Primary Care  "Provider: Scar Hussein MD     Primary Care Team: Networked reference to record PCT      HPI:   Elissa Raymond is a 69 year old female with a previous medical history of of a TAVR in 2022, A-fib with an loop recorder placed in June 2024, CVA x 2 with no residual deficits and COPD denies home oxygen use who presented to the ED for shortness of breath. Patient recently discharged on 3/25 after being admitted for COPD exacerbation. She has been taking oral steroids at home and reports feeling unwell with shortness of breath, generalized weakness and multiple episodes of syncope for the past week. Her  has the flu and she also tested positive for Flu A. CTA of chest performed in ED and negative for PE or infiltrate but did show incidental pulmonary nodule with recommended outpatient follow-up . Patient requiring oxygen via nasal cannula despite IV magnesium, IV steroids, and multiple breathing treatments. Patient has persistent severe cough but feels as if she is unable to cough up mucous. Noted to have pursed lip breathing. Labs unremarkable. Patient admitted by hospital medicine for further evaluation and management.      * No surgery found *       Hospital Course:   During hospitalization patient was admitted to [facility] telemetry for evaluation and management of COPD exacerbation.  Replacement supplemental oxygen, her baseline is room air however she has needed oxygen in the past after viral infection.  Placed on IV steroids.  CRP and procalcitonin ordered and reviewed.  She previously had a home O2 eval and qualified for oxygen, this was repeated on the day of discharge and she still qualifies.  She was seen and examined on the day of discharge in his medically stable.        Objective:    /76 (BP Location: Right arm, Patient Position: Sitting)   Pulse 84   Temp 97.6 °F (36.4 °C) (Oral)   Resp 16   Ht 5' 3" (1.6 m)   SpO2 98%   BMI 25.97 kg/m²  Body mass index is 25.97 kg/m².    BP Readings " from Last 3 Encounters:   04/11/25 119/76   03/30/25 (!) 188/84   03/25/25 (!) 152/73       Wt Readings from Last 3 Encounters:   03/28/25 66.5 kg (146 lb 9.7 oz)   03/23/25 66.5 kg (146 lb 9.7 oz)   07/12/24 64.8 kg (142 lb 13.7 oz)       Physical Exam  Constitutional:       General: She is not in acute distress.  HENT:      Head: Normocephalic.   Cardiovascular:      Rate and Rhythm: Normal rate and regular rhythm.   Pulmonary:      Effort: Pulmonary effort is normal.      Comments: Diminished breath sounds  Skin:     General: Skin is warm and dry.   Neurological:      Mental Status: She is alert.      Comments: Responds appropriately to situation/surroundings   Psychiatric:         Mood and Affect: Mood normal.         Behavior: Behavior normal.         Thought Content: Thought content normal.         Judgment: Judgment normal.           Assessment/Plan:  1. COPD exacerbation  Assessment & Plan:  Respiratory status improved but not at baseline per patient.  Start Breztri inhaler twice daily for controller therapy-Rx sent.  Duo-Neb nebulizer every 6 hours as needed- Rx sent.  Take Prednisone taper as prescribed- Rx sent.  Nebulizer order sent per patient request.  Albuterol inhaler as needed-Rx sent.  Mucinex as needed.  Smoking cessation.  Follow-up with Pulmonology- Apt on 5/1/25.        Orders:  -     budesonide-glycopyr-formoterol (BREZTRI AEROSPHERE) 160-9-4.8 mcg/actuation HFAA; Inhale 1 puff into the lungs 2 (two) times a day.  Dispense: 10.7 g; Refill: 3  -     albuterol-ipratropium (DUO-NEB) 2.5 mg-0.5 mg/3 mL nebulizer solution; Take 3 mLs by nebulization every 6 (six) hours as needed for Shortness of Breath.  Dispense: 75 mL; Refill: 3  -     NEBULIZER FOR HOME USE  -     predniSONE (DELTASONE) 20 MG tablet; Take 2 tablets (40 mg total) by mouth once daily for 3 days, THEN 1.5 tablets (30 mg total) once daily for 3 days, THEN 1 tablet (20 mg total) once daily for 3 days, THEN 0.5 tablets (10 mg total)  once daily for 3 days.  Dispense: 15 tablet; Refill: 0    2. Influenza A  Assessment & Plan:  Improved per patient.  Continue supportive therapy as needed.  Recommend yearly vaccination against influenza.      3. Tobacco dependence  Assessment & Plan:  Per patient, has not smoked for 1 week.  Encouraged continued smoking cessation, counseled on importance of continued cessation.  Smoking cessation aids/program offered, patient declines at this time.       4. Dysuria  Assessment & Plan:  UA with reflex to urine culture to rule out UTI. I will review and address results accordingly.  Maintain adequate hydration, avoid caffeine/spicy foods as this may irritate bladder.  Bladder hygiene measures.  -     Urinalysis, Reflex to Urine Culture    5. S/p TAVR (transcatheter aortic valve replacement), bioprosthetic  Assessment & Plan:  Hx of TAVR.  Follow-up with Cardiology- referral placed.    Orders:  -     Ambulatory referral/consult to Cardiology; Future; Expected date: 04/18/2025         Orders Placed This Encounter    NEBULIZER FOR HOME USE    Urinalysis, Reflex to Urine Culture    Ambulatory referral/consult to Cardiology    budesonide-glycopyr-formoterol (BREZTRI AEROSPHERE) 160-9-4.8 mcg/actuation HFAA    albuterol-ipratropium (DUO-NEB) 2.5 mg-0.5 mg/3 mL nebulizer solution    predniSONE (DELTASONE) 20 MG tablet    albuterol (PROVENTIL/VENTOLIN HFA) 90 mcg/actuation inhaler      Pulmonology follow-up on 5/1/25.  Follow-up with PCP- Dr Hussein.  Follow-up with Cardiology- referral placed.  .  Follow up if symptoms worsen or fail to improve.    This note was generated with the assistance of ambient listening technology. Verbal consent was obtained by the patient and accompanying visitor(s) for the recording of patient appointment to facilitate this note. I attest to having reviewed and edited the generated note for accuracy, though some syntax or spelling errors may persist. Please contact the author of this note for any  clarification.

## 2025-04-11 NOTE — ASSESSMENT & PLAN NOTE
Respiratory status improved but not at baseline per patient.  Start Breztri inhaler twice daily for controller therapy-Rx sent.  Duo-Neb nebulizer every 6 hours as needed- Rx sent.  Take Prednisone taper as prescribed- Rx sent.  Nebulizer order sent per patient request.  Mucinex as needed.  Smoking cessation.  Follow-up with Pulmonology- Apt on 5/1/25.

## 2025-04-11 NOTE — PATIENT INSTRUCTIONS
Dr Obando Lung doctor  Located in: [facility] [address]  [address] [place] [address]  Phone: [phone]   See below

## 2025-04-14 ENCOUNTER — RESULTS FOLLOW-UP (OUTPATIENT)
Dept: FAMILY MEDICINE | Facility: CLINIC | Age: 70
End: 2025-04-14

## 2025-04-14 ENCOUNTER — TELEPHONE (OUTPATIENT)
Dept: FAMILY MEDICINE | Facility: CLINIC | Age: 70
End: 2025-04-14
Payer: MEDICARE

## 2025-04-14 DIAGNOSIS — R30.0 DYSURIA: Primary | ICD-10-CM

## 2025-04-14 NOTE — TELEPHONE ENCOUNTER
----- Message from Nurse Wallis sent at 4/14/2025  3:33 PM CDT -----    ----- Message -----  From: Carine Varela  Sent: 4/14/2025   2:17 PM CDT  To: [place] Staff    Amy with [facility] called to get another urine order for pt. [phone]

## 2025-04-17 ENCOUNTER — TELEPHONE (OUTPATIENT)
Dept: FAMILY MEDICINE | Facility: CLINIC | Age: 70
End: 2025-04-17
Payer: MEDICARE

## 2025-04-17 NOTE — TELEPHONE ENCOUNTER
----- Message from ALMA Reis sent at 4/17/2025  8:37 AM CDT -----  Contact: Jeanette [phone]  Moustapha Hart can you please fax my progress note to the pharmacy? Thank you.  ----- Message -----  From: Eulalio Gao  Sent: 4/16/2025   9:23 AM CDT  To: ALMA Daniels    Type:  Pharmacy Calling to Clarify an RXName of Caller:[facility]ianaPharmacy [facility]: Nebulizer machineWhat do they need to clarify?:Clinic notesBest Call Back Number:[phone] Information: Pharm is req clinic notes for nebulizer. Pls call back and adv. Thank you.

## 2025-04-27 ENCOUNTER — HOSPITAL ENCOUNTER (OUTPATIENT)
Dept: CARDIOLOGY | Facility: CLINIC | Age: 70
Discharge: HOME | End: 2025-04-27
Attending: INTERNAL MEDICINE
Payer: MEDICARE

## 2025-04-27 ENCOUNTER — CLINICAL SUPPORT (OUTPATIENT)
Dept: CARDIOLOGY | Facility: CLINIC | Age: 70
End: 2025-04-27

## 2025-04-27 DIAGNOSIS — I49.8 OTHER SPECIFIED CARDIAC ARRHYTHMIAS: ICD-10-CM

## 2025-04-27 PROCEDURE — 93298 REM INTERROG DEV EVAL SCRMS: CPT | Mod: PN | Performed by: INTERNAL MEDICINE

## 2025-05-01 ENCOUNTER — OFFICE VISIT (OUTPATIENT)
Dept: PULMONOLOGY | Facility: CLINIC | Age: 70
End: 2025-05-01
Payer: MEDICARE

## 2025-05-01 VITALS
DIASTOLIC BLOOD PRESSURE: 80 MMHG | HEIGHT: 63 IN | SYSTOLIC BLOOD PRESSURE: 149 MMHG | OXYGEN SATURATION: 93 % | BODY MASS INDEX: 25.98 KG/M2 | HEART RATE: 79 BPM | WEIGHT: 146.63 LBS

## 2025-05-01 DIAGNOSIS — J44.9 CHRONIC OBSTRUCTIVE PULMONARY DISEASE, UNSPECIFIED COPD TYPE: ICD-10-CM

## 2025-05-01 DIAGNOSIS — J44.89 ASTHMA-COPD OVERLAP SYNDROME: ICD-10-CM

## 2025-05-01 DIAGNOSIS — J44.1 COPD EXACERBATION: ICD-10-CM

## 2025-05-01 DIAGNOSIS — J45.50 SEVERE PERSISTENT ASTHMA WITHOUT COMPLICATION: Primary | ICD-10-CM

## 2025-05-01 DIAGNOSIS — R91.1 SOLITARY PULMONARY NODULE: ICD-10-CM

## 2025-05-01 DIAGNOSIS — J82.83 EOSINOPHILIC ASTHMA: ICD-10-CM

## 2025-05-01 PROCEDURE — 3077F SYST BP >= 140 MM HG: CPT | Mod: CPTII,S$GLB,, | Performed by: INTERNAL MEDICINE

## 2025-05-01 PROCEDURE — 1159F MED LIST DOCD IN RCRD: CPT | Mod: CPTII,S$GLB,, | Performed by: INTERNAL MEDICINE

## 2025-05-01 PROCEDURE — 99999 PR PBB SHADOW E&M-EST. PATIENT-LVL III: CPT | Mod: PBBFAC,,, | Performed by: INTERNAL MEDICINE

## 2025-05-01 PROCEDURE — 99204 OFFICE O/P NEW MOD 45 MIN: CPT | Mod: S$GLB,,, | Performed by: INTERNAL MEDICINE

## 2025-05-01 PROCEDURE — 3079F DIAST BP 80-89 MM HG: CPT | Mod: CPTII,S$GLB,, | Performed by: INTERNAL MEDICINE

## 2025-05-01 PROCEDURE — 3008F BODY MASS INDEX DOCD: CPT | Mod: CPTII,S$GLB,, | Performed by: INTERNAL MEDICINE

## 2025-05-01 RX ORDER — PREDNISONE 10 MG/1
TABLET ORAL
Qty: 30 TABLET | Refills: 2 | Status: SHIPPED | OUTPATIENT
Start: 2025-05-01

## 2025-05-01 RX ORDER — FLUTICASONE FUROATE, UMECLIDINIUM BROMIDE AND VILANTEROL TRIFENATATE 200; 62.5; 25 UG/1; UG/1; UG/1
1 POWDER RESPIRATORY (INHALATION) DAILY
Qty: 180 EACH | Refills: 3 | Status: SHIPPED | OUTPATIENT
Start: 2025-05-01

## 2025-05-01 RX ORDER — ALBUTEROL SULFATE 90 UG/1
2 INHALANT RESPIRATORY (INHALATION) EVERY 4 HOURS PRN
Qty: 54 G | Refills: 3 | Status: SHIPPED | OUTPATIENT
Start: 2025-05-01

## 2025-05-01 NOTE — PROGRESS NOTES
Patient is here for UPLOADING Dupixent injection 600 mg/4 mL.  2 patient identifiers used (Name and ). 1- mg injection given into the right abdomen. 1- mg injection given into the left abdomen. No redness, swelling, bleeding, or reaction noted to injection site.  Pt tolerated well.    NDC: 0182-6240-57  LOT#: [id number]  Expiration: 2026

## 2025-05-01 NOTE — PROGRESS NOTES
5/1/2025    Elissa Raymond  New Patient Consult    Chief Complaint   Patient presents with    Hospital Follow Up    pulmonary embolism       HPI:     5/1/2025 pt's son with asthma. Pt is smoker, developed cough/wheeze with her heart procedure -- tarv -- tarv done 2022     Will have sinus drip, gets sinus infections over 2 x/yr -- worse spring and fall.    Cough worsens nightly.       Wheezes are heard easily.    Trelegy in office cleared cough dramatically    Chantix and wellbutrin and codeine ppt loopy.        Patient was admitted to [facility] for 3 days on March 27, 2025.  Patient has a history of a TAVR.  Atrial fib with a loop recorder.  She has had 2 strokes with no residual.  And has a history of COPD.  CT angiogram done in March is viewed by direct vision.  No pulmonary emboli are seen.  The patient did qualify for oxygen at discharge.     PFSH:  Past Medical History:   Diagnosis Date    A-fib     Blind in both eyes     Hyperlipidemia     Hypertension     Stroke     x 3    Tobacco use 11/02/2017    Unspecified macular degeneration     Unspecified macular degeneration          Past Surgical History:   Procedure Laterality Date    ANGIOGRAPHY OF LOWER EXTREMITY N/A 11/01/2023    Procedure: ANGIOGRAM BILATERLA LOWER;  Surgeon: Martin Hair MD;  Location: [facility] CATH/EP LAB;  Service: General;  Laterality: N/A;    AORTIC VALVE REPLACEMENT      AORTOGRAPHY WITH EXTREMITY RUNOFF N/A 11/01/2023    Procedure: AORTOGRAM, WITH EXTREMITY RUNOFF;  Surgeon: Martin Hair MD;  Location: [facility] CATH/EP LAB;  Service: General;  Laterality: N/A;    BLADDER SURGERY      BLADDER SURGERY      Lift.    CATARACT EXTRACTION      CHOLECYSTECTOMY      HYSTERECTOMY      INSERTION OF IMPLANTABLE LOOP RECORDER N/A 6/7/2024    Procedure: Insertion, Implantable Loop Recorder;  Surgeon: Todd Agee MD;  Location: [facility] CATH/EP LAB;  Service: Cardiology;  Laterality: N/A;    KNEE SURGERY Left      "TONSILLECTOMY       Social History[1]  Family History   Problem Relation Name Age of Onset    Diabetes Mother      Kidney disease Mother      Glaucoma Mother      Macular degeneration Mother      Cancer Father      Glaucoma Sister      Macular degeneration Sister      Macular degeneration Brother       Review of patient's allergies indicates:   Allergen Reactions    Pcn [penicillins] Other (See Comments)     Unable to obtain          Review of Systems:  a review of eleven systems covering constitutional, Eye, HEENT, Psych, Respiratory, Cardiac, GI, , Musculoskeletal, Endocrine, Dermatologic was negative except for pertinent findings as listed ABOVE and below:  pertinent positives as above, rest good        Exam:Comprehensive exam done. BP (!) 149/80 (BP Location: Right arm)   Pulse 79   Ht 5' 3" (1.6 m)   Wt 66.5 kg (146 lb 9.7 oz)   SpO2 (!) 93% Comment: on room air at rest  BMI 25.97 kg/m²   Exam included Vitals as listed, and patient's appearance and affect and alertness and mood, oral exam for yeast and hygiene and pharynx lesions and Mallapatti (M) score, neck with inspection for jvd and masses and thyroid abnormalities and lymph nodes (supraclavicular and infraclavicular nodes and axillary also examined and noted if abn), chest exam included symmetry and effort and fremitus and percussion and auscultation, cardiac exam included rhythm and gallops and murmur and rubs and jvd and edema, abdominal exam for mass and hepatosplenomegaly and tenderness and hernias and bowel sounds, Musculoskeletal exam with muscle tone and posture and mobility/gait and  strength, and skin for rashes and cyanosis and pallor and turgor, extremity for clubbing.  Findings were normal except for pertinent findings listed below:  M2,min rhonchi          Radiographs (ct chest and cxr) reviewed: view by direct vision      Labs reviewed           PFT results reviewed   1/18/2022 fev1 37%, rf 204, dlco 33 5%    Plan:  Clinical " impression is apparently straight forward and impression with management as below.     Elissa was seen today for hospital follow up and pulmonary embolism.    Diagnoses and all orders for this visit:    Severe persistent asthma without complication  -     predniSONE (DELTASONE) 10 MG tablet; Take 2 daily for 5 days  and repeat as needed for cough  -     fluticasone-umeclidin-vilanter (TRELEGY ELLIPTA) 200-62.5-25 mcg inhaler; Inhale 1 puff into the lungs once daily.    Eosinophilic asthma  -     dupilumab 300 mg/2 mL PnIj; Inject 2 mLs (300 mg total) into the skin every 14 (fourteen) days.    Solitary pulmonary nodule  -     CT Chest Without Contrast; Future    COPD exacerbation    Asthma-COPD overlap syndrome    Chronic obstructive pulmonary disease, unspecified COPD type  -     Six Minute Walk Test to qualify for Home Oxygen; Future  -     dupilumab 300 mg/2 mL PnIj; Inject 2 mLs (300 mg total) into the skin every 14 (fourteen) days.  -     albuterol (PROVENTIL/VENTOLIN HFA) 90 mcg/actuation inhaler; Inhale 2 puffs into the lungs every 4 (four) hours as needed for Wheezing or Shortness of Breath. Rescue        Follow up in about 2 months (around 7/1/2025).    Discussed with patient above for education the following:      Patient Instructions   Breztri 2 twice daily should keep lungs clear-- trelegy once daily may work in place brezrtri-- you did better on trelegy in office -- controller    Albuterol --  2 puffs every 4 hrs as needed    Use prednisone 10 mg -- use one (may dose 2 ) pills daily for 5 days, repeat as needed..   prednisone will remit asthma    Asthma and copd suggested.    Eosinophils have been elevated-- dupixent shots should stabilize  -- you needed hosp stay for breathing...      Breathing test was abnormal in 2022 with lung capacity only 37%      Ct chest viewed -- right lung nodule 3 mm and low chance cancer-- need follow up ct lungs in a yr.    Will dose dupixent 600 mg today then 300 mg every 2  weeks.      Will try to arrange portable oxygen concentrator.      Re check 2 months    Eval took 47 min         [1]   Social History  Tobacco Use    Smoking status: Every Day     Current packs/day: 0.25     Average packs/day: 0.3 packs/day for 55.3 years (13.8 ttl pk-yrs)     Types: Cigarettes     Start date: 1/1/1970    Smokeless tobacco: Never   Substance Use Topics    Drug use: No

## 2025-05-01 NOTE — PATIENT INSTRUCTIONS
Breztri 2 twice daily should keep lungs clear-- trelegy once daily may work in place brezrtri-- you did better on trelegy in office -- controller    Albuterol --  2 puffs every 4 hrs as needed    Use prednisone 10 mg -- use one (may dose 2 ) pills daily for 5 days, repeat as needed..   prednisone will remit asthma    Asthma and copd suggested.    Eosinophils have been elevated-- dupixent shots should stabilize  -- you needed hosp stay for breathing...      Breathing test was abnormal in 2022 with lung capacity only 37%      Ct chest viewed -- right lung nodule 3 mm and low chance cancer-- need follow up ct lungs in a yr.    Will dose dupixent 600 mg today then 300 mg every 2 weeks.      Will try to arrange portable oxygen concentrator.      Re check 2 months

## 2025-05-02 DIAGNOSIS — J44.9 CHRONIC OBSTRUCTIVE PULMONARY DISEASE, UNSPECIFIED COPD TYPE: ICD-10-CM

## 2025-05-05 RX ORDER — ALBUTEROL SULFATE 90 UG/1
2 INHALANT RESPIRATORY (INHALATION) EVERY 4 HOURS PRN
Qty: 54 G | Refills: 3 | Status: SHIPPED | OUTPATIENT
Start: 2025-05-05

## 2025-05-06 ENCOUNTER — RESULTS FOLLOW-UP (OUTPATIENT)
Dept: PULMONOLOGY | Facility: CLINIC | Age: 70
End: 2025-05-06

## 2025-05-06 ENCOUNTER — HOSPITAL ENCOUNTER (OUTPATIENT)
Dept: PULMONOLOGY | Facility: HOSPITAL | Age: 70
Discharge: HOME | End: 2025-05-06
Attending: INTERNAL MEDICINE
Payer: MEDICARE

## 2025-05-06 DIAGNOSIS — J44.9 CHRONIC OBSTRUCTIVE PULMONARY DISEASE, UNSPECIFIED COPD TYPE: ICD-10-CM

## 2025-05-06 PROCEDURE — 94618 PULMONARY STRESS TESTING: CPT

## 2025-05-28 ENCOUNTER — HOSPITAL ENCOUNTER (OUTPATIENT)
Dept: CARDIOLOGY | Facility: CLINIC | Age: 70
Discharge: HOME | End: 2025-05-28
Attending: INTERNAL MEDICINE
Payer: MEDICARE

## 2025-05-28 ENCOUNTER — CLINICAL SUPPORT (OUTPATIENT)
Dept: CARDIOLOGY | Facility: CLINIC | Age: 70
End: 2025-05-28

## 2025-05-28 DIAGNOSIS — I49.8 OTHER SPECIFIED CARDIAC ARRHYTHMIAS: ICD-10-CM

## 2025-05-28 PROCEDURE — 93298 REM INTERROG DEV EVAL SCRMS: CPT | Mod: PN | Performed by: INTERNAL MEDICINE

## 2025-05-30 ENCOUNTER — OFFICE VISIT (OUTPATIENT)
Dept: CARDIOLOGY | Facility: CLINIC | Age: 70
End: 2025-05-30
Payer: MEDICARE

## 2025-05-30 VITALS
SYSTOLIC BLOOD PRESSURE: 126 MMHG | DIASTOLIC BLOOD PRESSURE: 76 MMHG | OXYGEN SATURATION: 95 % | HEIGHT: 63 IN | WEIGHT: 139.56 LBS | HEART RATE: 89 BPM | BODY MASS INDEX: 24.73 KG/M2

## 2025-05-30 DIAGNOSIS — Z72.0 TOBACCO ABUSE: ICD-10-CM

## 2025-05-30 DIAGNOSIS — Z86.73 HISTORY OF STROKE: ICD-10-CM

## 2025-05-30 DIAGNOSIS — Z98.890 HISTORY OF LOOP RECORDER: Primary | ICD-10-CM

## 2025-05-30 DIAGNOSIS — Z95.3 S/P TAVR (TRANSCATHETER AORTIC VALVE REPLACEMENT), BIOPROSTHETIC: ICD-10-CM

## 2025-05-30 PROCEDURE — 99999 PR PBB SHADOW E&M-EST. PATIENT-LVL IV: CPT | Mod: PBBFAC,,, | Performed by: INTERNAL MEDICINE

## 2025-05-30 RX ORDER — AZITHROMYCIN 500 MG/1
500 TABLET, FILM COATED ORAL DAILY
Qty: 1 TABLET | Refills: 0 | Status: SHIPPED | OUTPATIENT
Start: 2025-05-30 | End: 2025-05-31

## 2025-05-30 NOTE — PROGRESS NOTES
[facility]    Subjective:     Patient ID:  Elissa Raymond is a 70 y.o. female patient here for evaluation Coronary Artery Disease (Annual)      History of Present Illness    CHIEF COMPLAINT:  Patient presents today for 6-month follow up of loop recorder placement    RECENT NEUROLOGICAL SYMPTOMS:  She experienced possible stroke symptoms 1 week ago including numbness lasting 3 days. She reports improvement in cloudy-headedness and walking stability. She has not sought neurological evaluation for symptoms.    LOOP RECORDER:  She has loop recorder implanted for cryptogenic stroke. She experienced burning sensation and tenderness at device site that resolved. She keeps monitor plugged in by bedside.    MEDICAL HISTORY:  She has history of artificial heart valve placement performed through neck, two pulmonary emboli, and a 1.4 cm hole in right lung.    SOCIAL HISTORY:  She recently resumed smoking after 5-month cessation.      ROS:  ROS is negative unless otherwise indicated in HPI.           Past Medical History:   Diagnosis Date    A-fib     Blind in both eyes     Hyperlipidemia     Hypertension     Stroke     x 3    Tobacco use 11/02/2017    Unspecified macular degeneration     Unspecified macular degeneration        Past Surgical History:   Procedure Laterality Date    ANGIOGRAPHY OF LOWER EXTREMITY N/A 11/01/2023    Procedure: ANGIOGRAM BILATERLA LOWER;  Surgeon: Martin Hair MD;  Location: [facility] CATH/EP LAB;  Service: General;  Laterality: N/A;    AORTIC VALVE REPLACEMENT      AORTOGRAPHY WITH EXTREMITY RUNOFF N/A 11/01/2023    Procedure: AORTOGRAM, WITH EXTREMITY RUNOFF;  Surgeon: Martin Hair MD;  Location: [facility] CATH/EP LAB;  Service: General;  Laterality: N/A;    BLADDER SURGERY      BLADDER SURGERY      Lift.    CATARACT EXTRACTION      CHOLECYSTECTOMY      HYSTERECTOMY      INSERTION OF IMPLANTABLE LOOP RECORDER N/A 6/7/2024    Procedure:  Insertion, Implantable Loop Recorder;  Surgeon: Todd Agee MD;  Location: [facility] CATH/EP LAB;  Service: Cardiology;  Laterality: N/A;    KNEE SURGERY Left     TONSILLECTOMY         Family History   Problem Relation Name Age of Onset    Diabetes Mother      Kidney disease Mother      Glaucoma Mother      Macular degeneration Mother      Cancer Father      Glaucoma Sister      Macular degeneration Sister      Macular degeneration Brother         Social History     Socioeconomic History    Marital status:    Tobacco Use    Smoking status: Every Day     Current packs/day: 0.25     Average packs/day: 0.3 packs/day for 55.4 years (13.9 ttl pk-yrs)     Types: Cigarettes     Start date: 1/1/1970    Smokeless tobacco: Never   Substance and Sexual Activity    Drug use: No   Social History Narrative    6/6/18: lives with Arpit, also a patient of mine. There are two dogs at home. She is the only smoker at home. She has two kids, they are is [place].      Social Drivers of Health     Financial Resource Strain: Low Risk  (3/28/2025)    Overall Financial Resource Strain (CARDIA)     Difficulty of Paying Living Expenses: Not hard at all   Food Insecurity: No Food Insecurity (3/28/2025)    Hunger Vital Sign     Worried About Running Out of Food in the Last Year: Never true     Ran Out of Food in the Last Year: Never true   Transportation Needs: No Transportation Needs (3/28/2025)    PRAPARE - Transportation     Lack of Transportation (Medical): No     Lack of Transportation (Non-Medical): No   Physical Activity: Inactive (2/23/2025)    [facility]    Exercise Vital Sign     Days of Exercise per Week: 0 days     Minutes of Exercise per Session: 0 min   Stress: No Stress Concern Present (3/28/2025)    [employer] [place] [employer] - Occupational Stress Questionnaire     Feeling of Stress : Not at all   Housing Stability: Low Risk  (3/28/2025)    Housing  Stability Vital Sign     Unable to Pay for Housing in the Last Year: No     Homeless in the Last Year: No       Current Medications[1]    Review of patient's allergies indicates:   Allergen Reactions    Pcn [penicillins] Other (See Comments)     Unable to obtain         Objective:        Vitals:    05/30/25 1014   BP: 126/76   Pulse: 89       Physical Exam  Vitals reviewed.   Constitutional:       Appearance: Normal appearance.   Cardiovascular:      Rate and Rhythm: Normal rate and regular rhythm.      Pulses: Normal pulses.      Heart sounds: Normal heart sounds. No murmur heard.     No gallop.   Pulmonary:      Effort: Pulmonary effort is normal.   Skin:     General: Skin is warm.   Neurological:      General: No focal deficit present.      Mental Status: She is alert and oriented to person, place, and time.         LIPIDS - LAST 2   Lab Results   Component Value Date    CHOL 193 06/09/2024    CHOL 220 (H) 04/05/2024    HDL 62 06/09/2024    HDL 48 04/05/2024    LDLCALC 108.0 06/09/2024    LDLCALC 150.4 04/05/2024    TRIG 115 06/09/2024    TRIG 108 04/05/2024    CHOLHDL 32.1 06/09/2024    CHOLHDL 21.8 04/05/2024       CBC - LAST 2  Lab Results   Component Value Date    WBC 25.72 (H) 03/30/2025    WBC 23.19 (H) 03/29/2025    RBC 4.16 03/30/2025    RBC 4.14 03/29/2025    HGB 12.6 03/30/2025    HGB 12.5 03/29/2025    HCT 39.3 03/30/2025    HCT 39.0 03/29/2025    MCV 95 03/30/2025    MCV 94 03/29/2025    MCH 30.3 03/30/2025    MCH 30.2 03/29/2025    MCHC 32.1 03/30/2025    MCHC 32.1 03/29/2025    RDW 13.6 03/30/2025    RDW 13.6 03/29/2025     (L) 03/30/2025     (L) 03/29/2025    MPV 12.1 03/30/2025    MPV 12.5 03/29/2025    GRAN 7.3 06/11/2024    GRAN 58.0 06/11/2024    LYMPH 3.5 06/11/2024    LYMPH 28.0 06/11/2024    MONO 0.9 06/11/2024    MONO 7.1 06/11/2024    BASO 0.01 06/11/2024    BASO 0.03 06/10/2024    NRBC 0 03/30/2025    NRBC 0 03/29/2025       CHEMISTRY & LIVER FUNCTION - LAST 2  Lab Results    Component Value Date     03/30/2025     (L) 03/29/2025    K 4.5 03/30/2025    K 4.4 03/29/2025     03/30/2025     03/29/2025    CO2 25 03/30/2025    CO2 23 03/29/2025    ANIONGAP 8 03/30/2025    ANIONGAP 7 (L) 03/29/2025    BUN 33 (H) 03/30/2025    BUN 33 (H) 03/29/2025    CREATININE 1.3 03/30/2025    CREATININE 1.3 03/29/2025     (H) 03/30/2025     (H) 03/29/2025    CALCIUM 8.2 (L) 03/30/2025    CALCIUM 8.5 (L) 03/29/2025    PH 7.380 03/28/2025    PH 7.391 03/27/2025    MG 2.2 03/30/2025    MG 2.7 (H) 03/29/2025    ALBUMIN 4.0 03/27/2025    ALBUMIN 3.6 03/23/2025    PROT 6.7 03/27/2025    PROT 6.1 03/23/2025    ALKPHOS 55 03/27/2025    ALKPHOS 51 (L) 03/23/2025    ALT 24 03/27/2025    ALT 9 (L) 03/23/2025    AST 31 03/27/2025    AST 14 03/23/2025    BILITOT 0.7 03/27/2025    BILITOT 0.4 03/23/2025        CARDIAC PROFILE - LAST 2  Lab Results   Component Value Date    BNP 64 03/27/2025    BNP 30 03/22/2025    CPK 75 09/20/2017    TROPONINI 0.013 05/16/2018    TROPONINI <0.006 09/20/2017        COAGULATION - LAST 2  Lab Results   Component Value Date    INR 1.0 06/10/2024    INR 1.0 06/09/2024    APTT 30.0 06/10/2024    APTT 27.9 10/30/2023       ENDOCRINE & PSA - LAST 2  Lab Results   Component Value Date    HGBA1C 5.9 08/24/2021    HGBA1C 5.0 06/06/2018    TSH 2.715 06/09/2024    TSH 1.093 06/06/2018        ECHOCARDIOGRAM RESULTS  Results for orders placed during the hospital encounter of 06/09/24    Echo    Interpretation Summary    Left Ventricle: The left ventricle is normal in size. Normal wall thickness. There is normal systolic function with a visually estimated ejection fraction of 65 - 70%. Grade I diastolic dysfunction. E/A ratio is 0.84.    Right Ventricle: Normal right ventricular cavity size. Systolic function is normal.    Mitral Valve: There is mild regurgitation.    Tricuspid Valve: There is mild regurgitation.    Pulmonic Valve: Not  assessed.      CURRENT/PREVIOUS VISIT EKG  Results for orders placed or performed during the hospital encounter of 03/27/25   EKG 12-lead    Collection Time: 03/27/25 12:07 PM   Result Value Ref Range    QRS Duration 66 ms    OHS QTC Calculation 466 ms    Narrative    Test Reason : Z13.6,    Vent. Rate :  86 BPM     Atrial Rate :  86 BPM     P-R Int : 124 ms          QRS Dur :  66 ms      QT Int : 390 ms       P-R-T Axes :  76  72  78 degrees    QTcB Int : 466 ms    Normal sinus rhythm  Normal ECG  Confirmed by Steff Álvarez ([id number]) on 3/27/2025 7:40:29 PM    Referred By: AAAREFERRAL SELF           Confirmed By: Steff Álvarez     No valid procedures specified.   No results found for this or any previous visit.    No valid procedures specified.        Assessment:        Assessment & Plan      70-year-old female with history of nonobstructive CAD and TAVR to right carotid access in 2022, history of concerning for embolism in the past status post loop recorder.  Loop recorder checks have not shown any atrial arrhythmias.       PLAN SUMMARY:  - Schedule echocardiogram at next year's visit to reassess valve function  - Recommend patient see PCP for possible recent stroke concerns  - Patient advised to quit smoking  - Stay active and continue walking  - Follow up in 1 year  - Contact office if experiencing chest pain, chest tightness, or worsening shortness of breath    CARDIAC IMPLANTS AND PROSTHETIC HEART VALVE:  - Loop recorder implanted 6 months ago shows no a-fib as of 2 days ago.  - Overall cardiac status stable, with absence of a-fib.  - Artificial heart valve functioning well based on last year's echocardiogram.  - Schedule echocardiogram at next year's visit to reassess valve function.    ABNORMAL ELECTROCARDIOGRAM:  - EKG mildly abnormal but not concerning - explained that nonspecific EKG changes are common, especially in patients with previous heart procedures.    NICOTINE DEPENDENCE:  - Patient  to quit smoking.    GENERAL HEALTH RECOMMENDATIONS:  - Stay active and continue walking.    - Recommend patient see PCP to address concerns about possible recent stroke.    FOLLOW-UP:  - Follow up in 1 year.  - Contact the office if experiencing chest pain, chest tightness, or worsening shortness of breath.        1. History of loop recorder    2. S/p TAVR (transcatheter aortic valve replacement), bioprosthetic    3. History of stroke    4. Tobacco abuse           Plan:       History of loop recorder    S/p TAVR (transcatheter aortic valve replacement), bioprosthetic  -     Ambulatory referral/consult to Cardiology  -     IN OFFICE EKG 12-LEAD (to [facility])    History of stroke    Tobacco abuse    Other orders  -     azithromycin (ZITHROMAX) 500 MG tablet; Take 1 tablet (500 mg total) by mouth once daily. 30-60 mins before dental procedures for endocarditis prophylaxis for 1 dose  Dispense: 1 tablet; Refill: 0      Follow up in about 1 year (around 5/30/2026) for f/u TAVR.        This note was generated with the assistance of ambient listening technology. Verbal consent was obtained by the patient and accompanying visitor(s) for the recording of patient appointment to facilitate this note. I attest to having reviewed and edited the generated note for accuracy, though some syntax or spelling errors may persist. Please contact the author of this note for any clarification.      MD [facility] [place] [facility] [place]         [1]   Current Outpatient Medications   Medication Sig Dispense Refill    albuterol (PROVENTIL/VENTOLIN HFA) 90 mcg/actuation inhaler Inhale 2 puffs into the lungs every 4 (four) hours as needed for Wheezing or Shortness of Breath. Rescue 54 g 3    albuterol-ipratropium (DUO-NEB) 2.5 mg-0.5 mg/3 mL nebulizer solution Take 3 mLs by nebulization every 6 (six) hours as needed for Shortness of Breath. 75 mL 3    aspirin (ECOTRIN) 81 MG EC tablet Take 1  tablet (81 mg total) by mouth once daily. 90 tablet 0    budesonide-glycopyr-formoterol (BREZTRI AEROSPHERE) 160-9-4.8 mcg/actuation HFAA Inhale 1 puff into the lungs 2 (two) times a day. 10.7 g 3    cinnamon bark (CINNAMON) 500 mg capsule Take 500 mg by mouth once daily.      dupilumab 300 mg/2 mL PnIj Inject 2 mLs (300 mg total) into the skin every 14 (fourteen) days. 4 mL 12    famotidine (PEPCID) 20 MG tablet Take 1 tablet (20 mg total) by mouth once daily. 30 tablet 11    fluticasone-umeclidin-vilanter (TRELEGY ELLIPTA) 200-62.5-25 mcg inhaler Inhale 1 puff into the lungs once daily. 180 each 3    ginger root (GINGER EXTRACT ORAL) Take 1 capsule by mouth once daily.      OXYGEN-AIR DELIVERY SYSTEMS MISC by [facility].(Non-Drug; Combo Route) route. 3 L by nasal route      predniSONE (DELTASONE) 10 MG tablet Take 2 daily for 5 days  and repeat as needed for cough 30 tablet 2    vitamin D (VITAMIN D3) 1000 units Tab Take 1,000 Units by mouth once daily.      azithromycin (ZITHROMAX) 500 MG tablet Take 1 tablet (500 mg total) by mouth once daily. 30-60 mins before dental procedures for endocarditis prophylaxis for 1 dose 1 tablet 0    CAPSICUM, CAYENNE, ORAL Take 1 capsule by mouth Daily. (Patient not taking: Reported on 4/11/2025)       No current facility-administered medications for this visit.

## 2025-06-20 ENCOUNTER — DOCUMENT SCAN (OUTPATIENT)
Dept: HOME HEALTH SERVICES | Facility: HOSPITAL | Age: 70
End: 2025-06-20
Payer: MEDICARE

## 2025-06-28 ENCOUNTER — CLINICAL SUPPORT (OUTPATIENT)
Dept: CARDIOLOGY | Facility: CLINIC | Age: 70
End: 2025-06-28

## 2025-06-28 ENCOUNTER — HOSPITAL ENCOUNTER (OUTPATIENT)
Dept: CARDIOLOGY | Facility: CLINIC | Age: 70
Discharge: HOME | End: 2025-06-28
Attending: INTERNAL MEDICINE
Payer: MEDICARE

## 2025-06-28 DIAGNOSIS — I49.8 OTHER SPECIFIED CARDIAC ARRHYTHMIAS: ICD-10-CM

## 2025-06-28 PROCEDURE — 93298 REM INTERROG DEV EVAL SCRMS: CPT | Mod: PN | Performed by: INTERNAL MEDICINE

## 2025-07-29 ENCOUNTER — HOSPITAL ENCOUNTER (OUTPATIENT)
Dept: CARDIOLOGY | Facility: CLINIC | Age: 70
Discharge: HOME | End: 2025-07-29
Attending: INTERNAL MEDICINE
Payer: MEDICARE

## 2025-07-29 ENCOUNTER — CLINICAL SUPPORT (OUTPATIENT)
Dept: CARDIOLOGY | Facility: CLINIC | Age: 70
End: 2025-07-29

## 2025-07-29 DIAGNOSIS — I49.8 OTHER SPECIFIED CARDIAC ARRHYTHMIAS: ICD-10-CM

## 2025-07-29 PROCEDURE — 93298 REM INTERROG DEV EVAL SCRMS: CPT | Mod: PN | Performed by: INTERNAL MEDICINE

## 2025-08-01 LAB
OHS CV AF BURDEN PERCENT: < 1
OHS CV DC REMOTE DEVICE TYPE: NORMAL

## (undated) DEVICE — CATHETER ANGIO OMNI FLUSH 10732201

## (undated) DEVICE — SHEATH PINNACLE 5FRX10CM W/GUIDEWIRE

## (undated) DEVICE — GUIDEWIRE STIFF ANGLED 035X260 LAUREATE